# Patient Record
Sex: MALE | Race: WHITE | NOT HISPANIC OR LATINO | Employment: FULL TIME | URBAN - METROPOLITAN AREA
[De-identification: names, ages, dates, MRNs, and addresses within clinical notes are randomized per-mention and may not be internally consistent; named-entity substitution may affect disease eponyms.]

---

## 2020-02-12 ENCOUNTER — DOCUMENTATION ONLY (OUTPATIENT)
Dept: FAMILY MEDICINE | Facility: CLINIC | Age: 25
End: 2020-02-12

## 2020-02-12 NOTE — PROGRESS NOTES
Pre-Visit Chart Review  For Appointment Scheduled on (2/13/20)    There are no preventive care reminders to display for this patient.

## 2020-02-13 ENCOUNTER — LAB VISIT (OUTPATIENT)
Dept: LAB | Facility: HOSPITAL | Age: 25
End: 2020-02-13
Attending: PHYSICIAN ASSISTANT
Payer: COMMERCIAL

## 2020-02-13 ENCOUNTER — OFFICE VISIT (OUTPATIENT)
Dept: FAMILY MEDICINE | Facility: CLINIC | Age: 25
End: 2020-02-13
Payer: COMMERCIAL

## 2020-02-13 VITALS
TEMPERATURE: 99 F | DIASTOLIC BLOOD PRESSURE: 76 MMHG | WEIGHT: 268.5 LBS | BODY MASS INDEX: 35.59 KG/M2 | HEART RATE: 69 BPM | HEIGHT: 73 IN | OXYGEN SATURATION: 97 % | SYSTOLIC BLOOD PRESSURE: 160 MMHG

## 2020-02-13 DIAGNOSIS — Z00.00 ANNUAL PHYSICAL EXAM: ICD-10-CM

## 2020-02-13 DIAGNOSIS — I10 ESSENTIAL HYPERTENSION: ICD-10-CM

## 2020-02-13 DIAGNOSIS — F32.A DEPRESSION, UNSPECIFIED DEPRESSION TYPE: ICD-10-CM

## 2020-02-13 DIAGNOSIS — Z00.00 ANNUAL PHYSICAL EXAM: Primary | ICD-10-CM

## 2020-02-13 DIAGNOSIS — Z23 IMMUNIZATION DUE: ICD-10-CM

## 2020-02-13 DIAGNOSIS — E66.01 CLASS 2 SEVERE OBESITY WITH SERIOUS COMORBIDITY AND BODY MASS INDEX (BMI) OF 35.0 TO 35.9 IN ADULT, UNSPECIFIED OBESITY TYPE: ICD-10-CM

## 2020-02-13 LAB
BASOPHILS # BLD AUTO: 0.04 K/UL (ref 0–0.2)
BASOPHILS NFR BLD: 0.6 % (ref 0–1.9)
DIFFERENTIAL METHOD: ABNORMAL
EOSINOPHIL # BLD AUTO: 0.1 K/UL (ref 0–0.5)
EOSINOPHIL NFR BLD: 1.3 % (ref 0–8)
ERYTHROCYTE [DISTWIDTH] IN BLOOD BY AUTOMATED COUNT: 11.4 % (ref 11.5–14.5)
HCT VFR BLD AUTO: 47.7 % (ref 40–54)
HGB BLD-MCNC: 15.3 G/DL (ref 14–18)
IMM GRANULOCYTES # BLD AUTO: 0.01 K/UL (ref 0–0.04)
IMM GRANULOCYTES NFR BLD AUTO: 0.1 % (ref 0–0.5)
LYMPHOCYTES # BLD AUTO: 2.5 K/UL (ref 1–4.8)
LYMPHOCYTES NFR BLD: 35 % (ref 18–48)
MCH RBC QN AUTO: 29.7 PG (ref 27–31)
MCHC RBC AUTO-ENTMCNC: 32.1 G/DL (ref 32–36)
MCV RBC AUTO: 92 FL (ref 82–98)
MONOCYTES # BLD AUTO: 0.8 K/UL (ref 0.3–1)
MONOCYTES NFR BLD: 10.9 % (ref 4–15)
NEUTROPHILS # BLD AUTO: 3.7 K/UL (ref 1.8–7.7)
NEUTROPHILS NFR BLD: 52.1 % (ref 38–73)
NRBC BLD-RTO: 0 /100 WBC
PLATELET # BLD AUTO: 259 K/UL (ref 150–350)
PMV BLD AUTO: 10.4 FL (ref 9.2–12.9)
RBC # BLD AUTO: 5.16 M/UL (ref 4.6–6.2)
WBC # BLD AUTO: 7 K/UL (ref 3.9–12.7)

## 2020-02-13 PROCEDURE — 90472 IMMUNIZATION ADMIN EACH ADD: CPT | Mod: S$GLB,,, | Performed by: PHYSICIAN ASSISTANT

## 2020-02-13 PROCEDURE — 99385 PR PREVENTIVE VISIT,NEW,18-39: ICD-10-PCS | Mod: 25,S$GLB,, | Performed by: PHYSICIAN ASSISTANT

## 2020-02-13 PROCEDURE — 90471 IMMUNIZATION ADMIN: CPT | Mod: S$GLB,,, | Performed by: PHYSICIAN ASSISTANT

## 2020-02-13 PROCEDURE — 90715 TDAP VACCINE GREATER THAN OR EQUAL TO 7YO IM: ICD-10-PCS | Mod: S$GLB,,, | Performed by: PHYSICIAN ASSISTANT

## 2020-02-13 PROCEDURE — 3008F PR BODY MASS INDEX (BMI) DOCUMENTED: ICD-10-PCS | Mod: CPTII,S$GLB,, | Performed by: PHYSICIAN ASSISTANT

## 2020-02-13 PROCEDURE — 3077F SYST BP >= 140 MM HG: CPT | Mod: CPTII,S$GLB,, | Performed by: PHYSICIAN ASSISTANT

## 2020-02-13 PROCEDURE — 80053 COMPREHEN METABOLIC PANEL: CPT

## 2020-02-13 PROCEDURE — 80061 LIPID PANEL: CPT

## 2020-02-13 PROCEDURE — 90471 FLU VACCINE (QUAD) GREATER THAN OR EQUAL TO 3YO PRESERVATIVE FREE IM: ICD-10-PCS | Mod: S$GLB,,, | Performed by: PHYSICIAN ASSISTANT

## 2020-02-13 PROCEDURE — 84443 ASSAY THYROID STIM HORMONE: CPT

## 2020-02-13 PROCEDURE — 3077F PR MOST RECENT SYSTOLIC BLOOD PRESSURE >= 140 MM HG: ICD-10-PCS | Mod: CPTII,S$GLB,, | Performed by: PHYSICIAN ASSISTANT

## 2020-02-13 PROCEDURE — 3078F DIAST BP <80 MM HG: CPT | Mod: CPTII,S$GLB,, | Performed by: PHYSICIAN ASSISTANT

## 2020-02-13 PROCEDURE — 90686 IIV4 VACC NO PRSV 0.5 ML IM: CPT | Mod: S$GLB,,, | Performed by: PHYSICIAN ASSISTANT

## 2020-02-13 PROCEDURE — 99385 PREV VISIT NEW AGE 18-39: CPT | Mod: 25,S$GLB,, | Performed by: PHYSICIAN ASSISTANT

## 2020-02-13 PROCEDURE — 36415 COLL VENOUS BLD VENIPUNCTURE: CPT | Mod: PO

## 2020-02-13 PROCEDURE — 99999 PR PBB SHADOW E&M-EST. PATIENT-LVL IV: ICD-10-PCS | Mod: PBBFAC,,, | Performed by: PHYSICIAN ASSISTANT

## 2020-02-13 PROCEDURE — 90686 FLU VACCINE (QUAD) GREATER THAN OR EQUAL TO 3YO PRESERVATIVE FREE IM: ICD-10-PCS | Mod: S$GLB,,, | Performed by: PHYSICIAN ASSISTANT

## 2020-02-13 PROCEDURE — 85025 COMPLETE CBC W/AUTO DIFF WBC: CPT

## 2020-02-13 PROCEDURE — 3008F BODY MASS INDEX DOCD: CPT | Mod: CPTII,S$GLB,, | Performed by: PHYSICIAN ASSISTANT

## 2020-02-13 PROCEDURE — 90715 TDAP VACCINE 7 YRS/> IM: CPT | Mod: S$GLB,,, | Performed by: PHYSICIAN ASSISTANT

## 2020-02-13 PROCEDURE — 99999 PR PBB SHADOW E&M-EST. PATIENT-LVL IV: CPT | Mod: PBBFAC,,, | Performed by: PHYSICIAN ASSISTANT

## 2020-02-13 PROCEDURE — 3078F PR MOST RECENT DIASTOLIC BLOOD PRESSURE < 80 MM HG: ICD-10-PCS | Mod: CPTII,S$GLB,, | Performed by: PHYSICIAN ASSISTANT

## 2020-02-13 PROCEDURE — 90472 TDAP VACCINE GREATER THAN OR EQUAL TO 7YO IM: ICD-10-PCS | Mod: S$GLB,,, | Performed by: PHYSICIAN ASSISTANT

## 2020-02-13 PROCEDURE — 83036 HEMOGLOBIN GLYCOSYLATED A1C: CPT

## 2020-02-13 RX ORDER — HYDROCHLOROTHIAZIDE 12.5 MG/1
12.5 TABLET ORAL DAILY
Qty: 30 TABLET | Refills: 11 | Status: SHIPPED | OUTPATIENT
Start: 2020-02-13 | End: 2021-02-08

## 2020-02-13 RX ORDER — ESCITALOPRAM OXALATE 10 MG/1
10 TABLET ORAL DAILY
Qty: 30 TABLET | Refills: 2 | Status: SHIPPED | OUTPATIENT
Start: 2020-02-13 | End: 2021-02-08

## 2020-02-13 NOTE — PROGRESS NOTES
Identified patient's name and . Administered flu vaccine, IM. Patient tolerated well, aseptic technique maintained. Pain scale 0/10 with injection. Instructed patient to wait in the clinic for 15 minutes after the injection was given. Given in left arm      Identified patient's name and . Administered TDAP vaccine, IM. Patient tolerated well, aseptic technique maintained. Pain scale 0/10 with injection. Instructed patient to wait in the clinic for 15 minutes after the injection was given. Givin in right arm

## 2020-02-13 NOTE — PROGRESS NOTES
Subjective:       Patient ID: Tino Fu is a 24 y.o. male.    Chief Complaint: Annual Exam    Patient is new to [facility].  He presents to establish care and follow-up on elevated blood pressure.  Patient states that he has been told several times in the past that his blood pressure was elevated.  His checked his blood pressure at home and has never had readings below 150 systolic.  He is asymptomatic from hypertensive standpoint.  He denies headache, chest pain, visual changes.  Patient states that he has been steadily gaining weight for the past several years.  He does not follow any particular diet or dizzy exercise.  Patient complains of feeling lethargic and having lack of motivation.  Patient's fiancee is registered nurse and is suggested possible treatment for depression.  He states that his lack motivation does not interfere with his activities of daily living.  He remains quite social with friends and family.  He reports increased amount of stress due to his upcoming wedding and purchasing of a new home.  Patients patient medical/surgical, social and family histories have been reviewed       Review of Systems   Constitutional: Positive for activity change. Negative for unexpected weight change.   HENT: Negative for hearing loss, rhinorrhea and trouble swallowing.    Eyes: Negative for discharge and visual disturbance.   Respiratory: Negative for chest tightness and wheezing.    Cardiovascular: Negative for chest pain and palpitations.   Gastrointestinal: Negative for blood in stool, constipation, diarrhea and vomiting.   Endocrine: Negative for polydipsia and polyuria.   Genitourinary: Negative for difficulty urinating, hematuria and urgency.   Musculoskeletal: Negative for arthralgias, joint swelling and neck pain.   Neurological: Negative for weakness and headaches.   Psychiatric/Behavioral: Positive for dysphoric mood. Negative for confusion, self-injury, sleep disturbance and  suicidal ideas. The patient is not nervous/anxious.        Objective:      Physical Exam   Constitutional: He appears well-developed and well-nourished. He is cooperative. No distress.   HENT:   Head: Normocephalic and atraumatic.   Eyes: Conjunctivae and EOM are normal. No scleral icterus.   Neck: Carotid bruit is not present.   Cardiovascular: Normal rate, regular rhythm and normal heart sounds.   Pulmonary/Chest: Effort normal and breath sounds normal.   Abdominal: Soft. Bowel sounds are normal. There is no tenderness.   Musculoskeletal:        Right lower leg: He exhibits no edema.        Left lower leg: He exhibits no edema.   Neurological: He is alert.   Skin: Skin is warm and dry. Capillary refill takes less than 2 seconds.   Psychiatric: He has a normal mood and affect. His speech is normal and behavior is normal. Judgment and thought content normal. Cognition and memory are normal.   PHQ-9 score is 8  Damian 7 score is 5   Vitals reviewed.      Assessment:       1. Annual physical exam    2. Essential hypertension    3. Depression, unspecified depression type    4. Class 2 severe obesity with serious comorbidity and body mass index (BMI) of 35.0 to 35.9 in adult, unspecified obesity type    5. Immunization due        Plan:       Tino was seen today for annual exam.    Diagnoses and all orders for this visit:    Annual physical exam  -     Hemoglobin A1c; Future  -     Lipid panel; Future  -     CBC auto differential; Future  -     Comprehensive metabolic panel; Future  -     Urinalysis; Future  -     TSH; Future  -     Cancel: HIV 1/2 Ag/Ab (4th Gen); Future    Essential hypertension  -     TSH; Future  -     hydroCHLOROthiazide (HYDRODIURIL) 12.5 MG Tab; Take 1 tablet (12.5 mg total) by mouth once daily.    Depression, unspecified depression type  -     escitalopram oxalate (LEXAPRO) 10 MG tablet; Take 1 tablet (10 mg total) by mouth once daily.    Class 2 severe obesity with serious comorbidity and body  mass index (BMI) of 35.0 to 35.9 in adult, unspecified obesity type  -     TSH; Future    Immunization due  -     Influenza - Quadrivalent (6 months+) (PF)    Other orders  -     (In Office Administered) Tdap Vaccine           Follow up for fasting lab today , me in 4 weeks dr ROLLINS in 3 mo .   DISCLAIMER: This note was prepared with [employer] voice recognition transcription software. Garbled syntax, mangled pronouns, and other bizarre constructions may be attributed to that software system

## 2020-02-14 LAB
ALBUMIN SERPL BCP-MCNC: 4.9 G/DL (ref 3.5–5.2)
ALP SERPL-CCNC: 57 U/L (ref 55–135)
ALT SERPL W/O P-5'-P-CCNC: 70 U/L (ref 10–44)
ANION GAP SERPL CALC-SCNC: 11 MMOL/L (ref 8–16)
AST SERPL-CCNC: 39 U/L (ref 10–40)
BILIRUB SERPL-MCNC: 1.4 MG/DL (ref 0.1–1)
BUN SERPL-MCNC: 10 MG/DL (ref 6–20)
CALCIUM SERPL-MCNC: 10.2 MG/DL (ref 8.7–10.5)
CHLORIDE SERPL-SCNC: 101 MMOL/L (ref 95–110)
CHOLEST SERPL-MCNC: 236 MG/DL (ref 120–199)
CHOLEST/HDLC SERPL: 4 {RATIO} (ref 2–5)
CO2 SERPL-SCNC: 26 MMOL/L (ref 23–29)
CREAT SERPL-MCNC: 0.9 MG/DL (ref 0.5–1.4)
EST. GFR  (AFRICAN AMERICAN): >60 ML/MIN/1.73 M^2
EST. GFR  (NON AFRICAN AMERICAN): >60 ML/MIN/1.73 M^2
ESTIMATED AVG GLUCOSE: 105 MG/DL (ref 68–131)
GLUCOSE SERPL-MCNC: 82 MG/DL (ref 70–110)
HBA1C MFR BLD HPLC: 5.3 % (ref 4–5.6)
HDLC SERPL-MCNC: 59 MG/DL (ref 40–75)
HDLC SERPL: 25 % (ref 20–50)
LDLC SERPL CALC-MCNC: 157.8 MG/DL (ref 63–159)
NONHDLC SERPL-MCNC: 177 MG/DL
POTASSIUM SERPL-SCNC: 4.1 MMOL/L (ref 3.5–5.1)
PROT SERPL-MCNC: 8.2 G/DL (ref 6–8.4)
SODIUM SERPL-SCNC: 138 MMOL/L (ref 136–145)
TRIGL SERPL-MCNC: 96 MG/DL (ref 30–150)
TSH SERPL DL<=0.005 MIU/L-ACNC: 0.76 UIU/ML (ref 0.4–4)

## 2020-03-12 ENCOUNTER — DOCUMENTATION ONLY (OUTPATIENT)
Dept: FAMILY MEDICINE | Facility: CLINIC | Age: 25
End: 2020-03-12

## 2020-03-12 NOTE — PROGRESS NOTES
Pre-Visit Chart Review  For Appointment Scheduled on (03/13/20)    There are no preventive care reminders to display for this patient.

## 2020-03-13 ENCOUNTER — OFFICE VISIT (OUTPATIENT)
Dept: FAMILY MEDICINE | Facility: CLINIC | Age: 25
End: 2020-03-13
Payer: COMMERCIAL

## 2020-03-13 VITALS
BODY MASS INDEX: 33.42 KG/M2 | OXYGEN SATURATION: 98 % | HEIGHT: 73 IN | DIASTOLIC BLOOD PRESSURE: 84 MMHG | HEART RATE: 61 BPM | TEMPERATURE: 98 F | WEIGHT: 252.19 LBS | SYSTOLIC BLOOD PRESSURE: 134 MMHG

## 2020-03-13 DIAGNOSIS — I10 ESSENTIAL HYPERTENSION: Primary | ICD-10-CM

## 2020-03-13 DIAGNOSIS — F32.A DEPRESSION, UNSPECIFIED DEPRESSION TYPE: ICD-10-CM

## 2020-03-13 PROCEDURE — 99213 OFFICE O/P EST LOW 20 MIN: CPT | Mod: S$GLB,,, | Performed by: PHYSICIAN ASSISTANT

## 2020-03-13 PROCEDURE — 99999 PR PBB SHADOW E&M-EST. PATIENT-LVL III: CPT | Mod: PBBFAC,,, | Performed by: PHYSICIAN ASSISTANT

## 2020-03-13 PROCEDURE — 99999 PR PBB SHADOW E&M-EST. PATIENT-LVL III: ICD-10-PCS | Mod: PBBFAC,,, | Performed by: PHYSICIAN ASSISTANT

## 2020-03-13 PROCEDURE — 3075F PR MOST RECENT SYSTOLIC BLOOD PRESS GE 130-139MM HG: ICD-10-PCS | Mod: CPTII,S$GLB,, | Performed by: PHYSICIAN ASSISTANT

## 2020-03-13 PROCEDURE — 3079F PR MOST RECENT DIASTOLIC BLOOD PRESSURE 80-89 MM HG: ICD-10-PCS | Mod: CPTII,S$GLB,, | Performed by: PHYSICIAN ASSISTANT

## 2020-03-13 PROCEDURE — 3079F DIAST BP 80-89 MM HG: CPT | Mod: CPTII,S$GLB,, | Performed by: PHYSICIAN ASSISTANT

## 2020-03-13 PROCEDURE — 3008F BODY MASS INDEX DOCD: CPT | Mod: CPTII,S$GLB,, | Performed by: PHYSICIAN ASSISTANT

## 2020-03-13 PROCEDURE — 3075F SYST BP GE 130 - 139MM HG: CPT | Mod: CPTII,S$GLB,, | Performed by: PHYSICIAN ASSISTANT

## 2020-03-13 PROCEDURE — 99213 PR OFFICE/OUTPT VISIT, EST, LEVL III, 20-29 MIN: ICD-10-PCS | Mod: S$GLB,,, | Performed by: PHYSICIAN ASSISTANT

## 2020-03-13 PROCEDURE — 3008F PR BODY MASS INDEX (BMI) DOCUMENTED: ICD-10-PCS | Mod: CPTII,S$GLB,, | Performed by: PHYSICIAN ASSISTANT

## 2020-03-13 NOTE — PROGRESS NOTES
Subjective:       Patient ID: Tino Fu is a 24 y.o. male.    Chief Complaint: Follow-up    Patient presents for one-month follow-up of hypertension and depression.  One month ago he was started on hydrochlorothiazide.  His blood pressure has been running in the 130s over 70s to 80 per his home log.  He is not having any adverse side effects to the medication.  Regarding his depression he is started Lexapro 10 mg daily.  He states that he has noticed a positive difference in his mood.  His labs showed elevated cholesterol and slightly elevated ALT.  Patient has started diet exercise program.  He is doing quite well and has lost 14 lb in the past 1 month.  Overall he is feeling well and has no complaints  Patients patient medical/surgical, social and family histories have been reviewed       Review of Systems   Constitutional: Negative for activity change, appetite change, fatigue and unexpected weight change.   Respiratory: Negative for cough, chest tightness and shortness of breath.    Cardiovascular: Negative for chest pain, palpitations and leg swelling.   Gastrointestinal: Negative for abdominal pain, anal bleeding, blood in stool, constipation, diarrhea and nausea.   Endocrine: Negative for polydipsia and polyuria.   Genitourinary: Negative for difficulty urinating, frequency, hematuria and urgency.   Musculoskeletal: Negative for arthralgias.   Skin: Negative for rash.   Neurological: Negative for dizziness and headaches.   Psychiatric/Behavioral: Negative for dysphoric mood. The patient is not nervous/anxious.        Objective:      Physical Exam   Constitutional: He appears well-developed and well-nourished. He is cooperative. No distress.   HENT:   Head: Normocephalic and atraumatic.   Cardiovascular: Normal rate, regular rhythm and normal heart sounds.   Pulmonary/Chest: Effort normal and breath sounds normal.   Musculoskeletal:        Right lower leg: He exhibits no edema.        Left lower leg:  He exhibits no edema.   Neurological: He is alert.   Skin: Skin is warm and dry. Capillary refill takes less than 2 seconds.       Assessment:       1. Essential hypertension    2. Depression, unspecified depression type        Plan:       Tino was seen today for follow-up.    Diagnoses and all orders for this visit:    Essential hypertension  -     Comprehensive metabolic panel; Future  -     Lipid panel; Future  Continue hydrochlorothiazide  Depression, unspecified depression type  Continue Lexapro         No follow-ups on file.  Follow-up as scheduled and we will repeat his CMP and lipid panel to check for improvement with his very strict lifestyle modifications  DISCLAIMER: This note was prepared with [employer] voice recognition transcription software. Garbled syntax, mangled pronouns, and other bizarre constructions may be attributed to that software system

## 2020-10-09 ENCOUNTER — OCCUPATIONAL HEALTH (OUTPATIENT)
Dept: URGENT CARE | Facility: CLINIC | Age: 25
End: 2020-10-09

## 2020-10-09 PROCEDURE — 80305 DRUG TEST PRSMV DIR OPT OBS: CPT | Mod: S$GLB,,, | Performed by: EMERGENCY MEDICINE

## 2020-10-09 PROCEDURE — 80305 PR DRUG SCREEN - 1: ICD-10-PCS | Mod: S$GLB,,, | Performed by: EMERGENCY MEDICINE

## 2021-02-08 ENCOUNTER — OFFICE VISIT (OUTPATIENT)
Dept: FAMILY MEDICINE | Facility: CLINIC | Age: 26
End: 2021-02-08
Payer: COMMERCIAL

## 2021-02-08 ENCOUNTER — LAB VISIT (OUTPATIENT)
Dept: LAB | Facility: HOSPITAL | Age: 26
End: 2021-02-08
Attending: PHYSICIAN ASSISTANT
Payer: COMMERCIAL

## 2021-02-08 VITALS
BODY MASS INDEX: 34.95 KG/M2 | WEIGHT: 263.69 LBS | SYSTOLIC BLOOD PRESSURE: 138 MMHG | DIASTOLIC BLOOD PRESSURE: 84 MMHG | HEIGHT: 73 IN | HEART RATE: 81 BPM | TEMPERATURE: 98 F | OXYGEN SATURATION: 98 %

## 2021-02-08 DIAGNOSIS — I10 ESSENTIAL HYPERTENSION: ICD-10-CM

## 2021-02-08 DIAGNOSIS — Z00.00 ANNUAL PHYSICAL EXAM: ICD-10-CM

## 2021-02-08 DIAGNOSIS — Z23 IMMUNIZATION DUE: ICD-10-CM

## 2021-02-08 DIAGNOSIS — Z87.438 HISTORY OF PROSTATITIS: ICD-10-CM

## 2021-02-08 DIAGNOSIS — Z00.00 ANNUAL PHYSICAL EXAM: Primary | ICD-10-CM

## 2021-02-08 LAB
BASOPHILS # BLD AUTO: 0.05 K/UL (ref 0–0.2)
BASOPHILS NFR BLD: 0.7 % (ref 0–1.9)
DIFFERENTIAL METHOD: NORMAL
EOSINOPHIL # BLD AUTO: 0.1 K/UL (ref 0–0.5)
EOSINOPHIL NFR BLD: 1.4 % (ref 0–8)
ERYTHROCYTE [DISTWIDTH] IN BLOOD BY AUTOMATED COUNT: 11.5 % (ref 11.5–14.5)
HCT VFR BLD AUTO: 44.2 % (ref 40–54)
HGB BLD-MCNC: 14.7 G/DL (ref 14–18)
IMM GRANULOCYTES # BLD AUTO: 0.01 K/UL (ref 0–0.04)
IMM GRANULOCYTES NFR BLD AUTO: 0.1 % (ref 0–0.5)
LYMPHOCYTES # BLD AUTO: 2.4 K/UL (ref 1–4.8)
LYMPHOCYTES NFR BLD: 34.8 % (ref 18–48)
MCH RBC QN AUTO: 29.5 PG (ref 27–31)
MCHC RBC AUTO-ENTMCNC: 33.3 G/DL (ref 32–36)
MCV RBC AUTO: 89 FL (ref 82–98)
MONOCYTES # BLD AUTO: 0.7 K/UL (ref 0.3–1)
MONOCYTES NFR BLD: 10.7 % (ref 4–15)
NEUTROPHILS # BLD AUTO: 3.6 K/UL (ref 1.8–7.7)
NEUTROPHILS NFR BLD: 52.3 % (ref 38–73)
NRBC BLD-RTO: 0 /100 WBC
PLATELET # BLD AUTO: 210 K/UL (ref 150–350)
PMV BLD AUTO: 11 FL (ref 9.2–12.9)
RBC # BLD AUTO: 4.98 M/UL (ref 4.6–6.2)
WBC # BLD AUTO: 6.93 K/UL (ref 3.9–12.7)

## 2021-02-08 PROCEDURE — 90686 IIV4 VACC NO PRSV 0.5 ML IM: CPT | Mod: S$GLB,,, | Performed by: PHYSICIAN ASSISTANT

## 2021-02-08 PROCEDURE — 80061 LIPID PANEL: CPT

## 2021-02-08 PROCEDURE — 99999 PR PBB SHADOW E&M-EST. PATIENT-LVL III: ICD-10-PCS | Mod: PBBFAC,,, | Performed by: PHYSICIAN ASSISTANT

## 2021-02-08 PROCEDURE — 99999 PR PBB SHADOW E&M-EST. PATIENT-LVL III: CPT | Mod: PBBFAC,,, | Performed by: PHYSICIAN ASSISTANT

## 2021-02-08 PROCEDURE — 3075F PR MOST RECENT SYSTOLIC BLOOD PRESS GE 130-139MM HG: ICD-10-PCS | Mod: CPTII,S$GLB,, | Performed by: PHYSICIAN ASSISTANT

## 2021-02-08 PROCEDURE — 90471 IMMUNIZATION ADMIN: CPT | Mod: S$GLB,,, | Performed by: PHYSICIAN ASSISTANT

## 2021-02-08 PROCEDURE — 1126F PR PAIN SEVERITY QUANTIFIED, NO PAIN PRESENT: ICD-10-PCS | Mod: S$GLB,,, | Performed by: PHYSICIAN ASSISTANT

## 2021-02-08 PROCEDURE — 80053 COMPREHEN METABOLIC PANEL: CPT

## 2021-02-08 PROCEDURE — 85025 COMPLETE CBC W/AUTO DIFF WBC: CPT

## 2021-02-08 PROCEDURE — 3075F SYST BP GE 130 - 139MM HG: CPT | Mod: CPTII,S$GLB,, | Performed by: PHYSICIAN ASSISTANT

## 2021-02-08 PROCEDURE — 99395 PR PREVENTIVE VISIT,EST,18-39: ICD-10-PCS | Mod: 25,S$GLB,, | Performed by: PHYSICIAN ASSISTANT

## 2021-02-08 PROCEDURE — 86703 HIV-1/HIV-2 1 RESULT ANTBDY: CPT

## 2021-02-08 PROCEDURE — 3079F DIAST BP 80-89 MM HG: CPT | Mod: CPTII,S$GLB,, | Performed by: PHYSICIAN ASSISTANT

## 2021-02-08 PROCEDURE — 3079F PR MOST RECENT DIASTOLIC BLOOD PRESSURE 80-89 MM HG: ICD-10-PCS | Mod: CPTII,S$GLB,, | Performed by: PHYSICIAN ASSISTANT

## 2021-02-08 PROCEDURE — 3008F BODY MASS INDEX DOCD: CPT | Mod: CPTII,S$GLB,, | Performed by: PHYSICIAN ASSISTANT

## 2021-02-08 PROCEDURE — 1126F AMNT PAIN NOTED NONE PRSNT: CPT | Mod: S$GLB,,, | Performed by: PHYSICIAN ASSISTANT

## 2021-02-08 PROCEDURE — 86803 HEPATITIS C AB TEST: CPT

## 2021-02-08 PROCEDURE — 36415 COLL VENOUS BLD VENIPUNCTURE: CPT | Mod: PO

## 2021-02-08 PROCEDURE — 3008F PR BODY MASS INDEX (BMI) DOCUMENTED: ICD-10-PCS | Mod: CPTII,S$GLB,, | Performed by: PHYSICIAN ASSISTANT

## 2021-02-08 PROCEDURE — 90471 FLU VACCINE (QUAD) GREATER THAN OR EQUAL TO 3YO PRESERVATIVE FREE IM: ICD-10-PCS | Mod: S$GLB,,, | Performed by: PHYSICIAN ASSISTANT

## 2021-02-08 PROCEDURE — 99395 PREV VISIT EST AGE 18-39: CPT | Mod: 25,S$GLB,, | Performed by: PHYSICIAN ASSISTANT

## 2021-02-08 PROCEDURE — 90686 FLU VACCINE (QUAD) GREATER THAN OR EQUAL TO 3YO PRESERVATIVE FREE IM: ICD-10-PCS | Mod: S$GLB,,, | Performed by: PHYSICIAN ASSISTANT

## 2021-02-08 RX ORDER — HYDROCHLOROTHIAZIDE 25 MG/1
25 TABLET ORAL DAILY
Qty: 30 TABLET | Refills: 11 | Status: SHIPPED | OUTPATIENT
Start: 2021-02-08 | End: 2022-03-15

## 2021-02-09 LAB
ALBUMIN SERPL BCP-MCNC: 4.5 G/DL (ref 3.5–5.2)
ALP SERPL-CCNC: 56 U/L (ref 55–135)
ALT SERPL W/O P-5'-P-CCNC: 28 U/L (ref 10–44)
ANION GAP SERPL CALC-SCNC: 14 MMOL/L (ref 8–16)
AST SERPL-CCNC: 26 U/L (ref 10–40)
BILIRUB SERPL-MCNC: 1.3 MG/DL (ref 0.1–1)
BUN SERPL-MCNC: 8 MG/DL (ref 6–20)
CALCIUM SERPL-MCNC: 9.6 MG/DL (ref 8.7–10.5)
CHLORIDE SERPL-SCNC: 102 MMOL/L (ref 95–110)
CHOLEST SERPL-MCNC: 202 MG/DL (ref 120–199)
CHOLEST/HDLC SERPL: 3.7 {RATIO} (ref 2–5)
CO2 SERPL-SCNC: 23 MMOL/L (ref 23–29)
CREAT SERPL-MCNC: 0.8 MG/DL (ref 0.5–1.4)
EST. GFR  (AFRICAN AMERICAN): >60 ML/MIN/1.73 M^2
EST. GFR  (NON AFRICAN AMERICAN): >60 ML/MIN/1.73 M^2
GLUCOSE SERPL-MCNC: 79 MG/DL (ref 70–110)
HCV AB SERPL QL IA: NEGATIVE
HDLC SERPL-MCNC: 55 MG/DL (ref 40–75)
HDLC SERPL: 27.2 % (ref 20–50)
HIV 1+2 AB+HIV1 P24 AG SERPL QL IA: NEGATIVE
LDLC SERPL CALC-MCNC: 123 MG/DL (ref 63–159)
NONHDLC SERPL-MCNC: 147 MG/DL
POTASSIUM SERPL-SCNC: 3.8 MMOL/L (ref 3.5–5.1)
PROT SERPL-MCNC: 7.4 G/DL (ref 6–8.4)
SODIUM SERPL-SCNC: 139 MMOL/L (ref 136–145)
TRIGL SERPL-MCNC: 120 MG/DL (ref 30–150)

## 2021-02-15 ENCOUNTER — OFFICE VISIT (OUTPATIENT)
Dept: UROLOGY | Facility: CLINIC | Age: 26
End: 2021-02-15
Payer: COMMERCIAL

## 2021-02-15 VITALS
HEART RATE: 74 BPM | WEIGHT: 262.38 LBS | DIASTOLIC BLOOD PRESSURE: 76 MMHG | BODY MASS INDEX: 34.77 KG/M2 | SYSTOLIC BLOOD PRESSURE: 144 MMHG | HEIGHT: 73 IN | TEMPERATURE: 97 F

## 2021-02-15 DIAGNOSIS — N50.811 RIGHT TESTICULAR PAIN: Primary | ICD-10-CM

## 2021-02-15 PROCEDURE — 99204 OFFICE O/P NEW MOD 45 MIN: CPT | Mod: S$GLB,,, | Performed by: UROLOGY

## 2021-02-15 PROCEDURE — 99999 PR PBB SHADOW E&M-EST. PATIENT-LVL III: ICD-10-PCS | Mod: PBBFAC,,, | Performed by: UROLOGY

## 2021-02-15 PROCEDURE — 99204 PR OFFICE/OUTPT VISIT, NEW, LEVL IV, 45-59 MIN: ICD-10-PCS | Mod: S$GLB,,, | Performed by: UROLOGY

## 2021-02-15 PROCEDURE — 3077F SYST BP >= 140 MM HG: CPT | Mod: CPTII,S$GLB,, | Performed by: UROLOGY

## 2021-02-15 PROCEDURE — 3078F PR MOST RECENT DIASTOLIC BLOOD PRESSURE < 80 MM HG: ICD-10-PCS | Mod: CPTII,S$GLB,, | Performed by: UROLOGY

## 2021-02-15 PROCEDURE — 3008F BODY MASS INDEX DOCD: CPT | Mod: CPTII,S$GLB,, | Performed by: UROLOGY

## 2021-02-15 PROCEDURE — 1126F AMNT PAIN NOTED NONE PRSNT: CPT | Mod: S$GLB,,, | Performed by: UROLOGY

## 2021-02-15 PROCEDURE — 1126F PR PAIN SEVERITY QUANTIFIED, NO PAIN PRESENT: ICD-10-PCS | Mod: S$GLB,,, | Performed by: UROLOGY

## 2021-02-15 PROCEDURE — 3077F PR MOST RECENT SYSTOLIC BLOOD PRESSURE >= 140 MM HG: ICD-10-PCS | Mod: CPTII,S$GLB,, | Performed by: UROLOGY

## 2021-02-15 PROCEDURE — 99999 PR PBB SHADOW E&M-EST. PATIENT-LVL III: CPT | Mod: PBBFAC,,, | Performed by: UROLOGY

## 2021-02-15 PROCEDURE — 3008F PR BODY MASS INDEX (BMI) DOCUMENTED: ICD-10-PCS | Mod: CPTII,S$GLB,, | Performed by: UROLOGY

## 2021-02-15 PROCEDURE — 3078F DIAST BP <80 MM HG: CPT | Mod: CPTII,S$GLB,, | Performed by: UROLOGY

## 2021-02-22 ENCOUNTER — HOSPITAL ENCOUNTER (OUTPATIENT)
Dept: RADIOLOGY | Facility: HOSPITAL | Age: 26
Discharge: HOME | End: 2021-02-22
Attending: UROLOGY
Payer: COMMERCIAL

## 2021-02-22 DIAGNOSIS — N50.811 RIGHT TESTICULAR PAIN: ICD-10-CM

## 2021-02-22 PROCEDURE — 76870 US EXAM SCROTUM: CPT | Mod: TC

## 2021-02-22 PROCEDURE — 76870 US SCROTUM AND TESTICLES: ICD-10-PCS | Mod: 26,,, | Performed by: RADIOLOGY

## 2021-02-22 PROCEDURE — 76870 US EXAM SCROTUM: CPT | Mod: 26,,, | Performed by: RADIOLOGY

## 2021-02-23 ENCOUNTER — PATIENT MESSAGE (OUTPATIENT)
Dept: UROLOGY | Facility: CLINIC | Age: 26
End: 2021-02-23

## 2022-01-24 ENCOUNTER — TELEPHONE (OUTPATIENT)
Dept: FAMILY MEDICINE | Facility: CLINIC | Age: 27
End: 2022-01-24
Payer: COMMERCIAL

## 2022-01-31 ENCOUNTER — PATIENT MESSAGE (OUTPATIENT)
Dept: FAMILY MEDICINE | Facility: CLINIC | Age: 27
End: 2022-01-31
Payer: COMMERCIAL

## 2022-02-01 ENCOUNTER — OFFICE VISIT (OUTPATIENT)
Dept: FAMILY MEDICINE | Facility: CLINIC | Age: 27
End: 2022-02-01
Payer: COMMERCIAL

## 2022-02-01 ENCOUNTER — HOSPITAL ENCOUNTER (OUTPATIENT)
Dept: RADIOLOGY | Facility: CLINIC | Age: 27
Discharge: HOME | End: 2022-02-01
Attending: PHYSICIAN ASSISTANT
Payer: COMMERCIAL

## 2022-02-01 VITALS
HEART RATE: 82 BPM | BODY MASS INDEX: 34.09 KG/M2 | HEIGHT: 73 IN | WEIGHT: 257.25 LBS | OXYGEN SATURATION: 97 % | SYSTOLIC BLOOD PRESSURE: 124 MMHG | DIASTOLIC BLOOD PRESSURE: 78 MMHG | TEMPERATURE: 99 F

## 2022-02-01 DIAGNOSIS — M79.672 FOOT PAIN, BILATERAL: ICD-10-CM

## 2022-02-01 DIAGNOSIS — M79.671 FOOT PAIN, BILATERAL: ICD-10-CM

## 2022-02-01 DIAGNOSIS — Z00.00 ANNUAL PHYSICAL EXAM: Primary | ICD-10-CM

## 2022-02-01 DIAGNOSIS — I10 ESSENTIAL HYPERTENSION: ICD-10-CM

## 2022-02-01 DIAGNOSIS — L98.9 SKIN LESIONS, GENERALIZED: ICD-10-CM

## 2022-02-01 DIAGNOSIS — H61.22 CERUMEN DEBRIS ON TYMPANIC MEMBRANE OF LEFT EAR: ICD-10-CM

## 2022-02-01 PROCEDURE — 90471 FLU VACCINE (QUAD) GREATER THAN OR EQUAL TO 3YO PRESERVATIVE FREE IM: ICD-10-PCS | Mod: S$GLB,,, | Performed by: PHYSICIAN ASSISTANT

## 2022-02-01 PROCEDURE — 90686 FLU VACCINE (QUAD) GREATER THAN OR EQUAL TO 3YO PRESERVATIVE FREE IM: ICD-10-PCS | Mod: S$GLB,,, | Performed by: PHYSICIAN ASSISTANT

## 2022-02-01 PROCEDURE — 73630 X-RAY EXAM OF FOOT: CPT | Mod: TC,50,FY,PO

## 2022-02-01 PROCEDURE — 99395 PR PREVENTIVE VISIT,EST,18-39: ICD-10-PCS | Mod: 25,S$GLB,, | Performed by: PHYSICIAN ASSISTANT

## 2022-02-01 PROCEDURE — 3074F PR MOST RECENT SYSTOLIC BLOOD PRESSURE < 130 MM HG: ICD-10-PCS | Mod: CPTII,S$GLB,, | Performed by: PHYSICIAN ASSISTANT

## 2022-02-01 PROCEDURE — 99999 PR PBB SHADOW E&M-EST. PATIENT-LVL V: ICD-10-PCS | Mod: PBBFAC,,, | Performed by: PHYSICIAN ASSISTANT

## 2022-02-01 PROCEDURE — 99213 OFFICE O/P EST LOW 20 MIN: CPT | Mod: 25,S$GLB,, | Performed by: PHYSICIAN ASSISTANT

## 2022-02-01 PROCEDURE — 3078F PR MOST RECENT DIASTOLIC BLOOD PRESSURE < 80 MM HG: ICD-10-PCS | Mod: CPTII,S$GLB,, | Performed by: PHYSICIAN ASSISTANT

## 2022-02-01 PROCEDURE — 90471 IMMUNIZATION ADMIN: CPT | Mod: S$GLB,,, | Performed by: PHYSICIAN ASSISTANT

## 2022-02-01 PROCEDURE — 73630 XR FOOT COMPLETE 3 VIEW BILATERAL: ICD-10-PCS | Mod: 26,S$GLB,, | Performed by: RADIOLOGY

## 2022-02-01 PROCEDURE — 3074F SYST BP LT 130 MM HG: CPT | Mod: CPTII,S$GLB,, | Performed by: PHYSICIAN ASSISTANT

## 2022-02-01 PROCEDURE — 99213 PR OFFICE/OUTPT VISIT, EST, LEVL III, 20-29 MIN: ICD-10-PCS | Mod: 25,S$GLB,, | Performed by: PHYSICIAN ASSISTANT

## 2022-02-01 PROCEDURE — 3008F BODY MASS INDEX DOCD: CPT | Mod: CPTII,S$GLB,, | Performed by: PHYSICIAN ASSISTANT

## 2022-02-01 PROCEDURE — 1159F MED LIST DOCD IN RCRD: CPT | Mod: CPTII,S$GLB,, | Performed by: PHYSICIAN ASSISTANT

## 2022-02-01 PROCEDURE — 99395 PREV VISIT EST AGE 18-39: CPT | Mod: 25,S$GLB,, | Performed by: PHYSICIAN ASSISTANT

## 2022-02-01 PROCEDURE — 3078F DIAST BP <80 MM HG: CPT | Mod: CPTII,S$GLB,, | Performed by: PHYSICIAN ASSISTANT

## 2022-02-01 PROCEDURE — 1159F PR MEDICATION LIST DOCUMENTED IN MEDICAL RECORD: ICD-10-PCS | Mod: CPTII,S$GLB,, | Performed by: PHYSICIAN ASSISTANT

## 2022-02-01 PROCEDURE — 90686 IIV4 VACC NO PRSV 0.5 ML IM: CPT | Mod: S$GLB,,, | Performed by: PHYSICIAN ASSISTANT

## 2022-02-01 PROCEDURE — 3008F PR BODY MASS INDEX (BMI) DOCUMENTED: ICD-10-PCS | Mod: CPTII,S$GLB,, | Performed by: PHYSICIAN ASSISTANT

## 2022-02-01 PROCEDURE — 99999 PR PBB SHADOW E&M-EST. PATIENT-LVL V: CPT | Mod: PBBFAC,,, | Performed by: PHYSICIAN ASSISTANT

## 2022-02-01 PROCEDURE — 73630 X-RAY EXAM OF FOOT: CPT | Mod: 26,S$GLB,, | Performed by: RADIOLOGY

## 2022-02-01 NOTE — PROGRESS NOTES
Subjective:       Patient ID: Tino Fu is a 26 y.o. male.    Chief Complaint: Annual Exam    Patient with controlled hypertension presents for routine annual exam.  Regarding health maintenance he is due for influenza vaccine and COVID booster.  He is due for routine lab as well.  He has several issues that he would like to discuss.  First he complains of removing a large amount of cerumen from the right ear several weeks ago.  He would like for me to examine the left ear.  He denies any pain or decreased hearing.  Next a complains of bilateral foot pain.  This has been occurring for many years.  Pain increases with standing or walking for long periods of time.  He has no injury.  Pain improves with rest.  He occasionally will take acetaminophen for pain.  Patients patient medical/surgical, social and family histories have been reviewed       Review of Systems   Musculoskeletal: Positive for arthralgias.   All other systems reviewed and are negative.      Objective:      Physical Exam  Vitals reviewed.   Constitutional:       General: He is not in acute distress.     Appearance: Normal appearance. He is well-developed and well-nourished. He is not ill-appearing.   HENT:      Head: Normocephalic and atraumatic.      Right Ear: Tympanic membrane, ear canal and external ear normal.      Left Ear: There is impacted cerumen.   Eyes:      General: No scleral icterus.     Extraocular Movements: EOM normal.      Conjunctiva/sclera: Conjunctivae normal.   Neck:      Vascular: No carotid bruit.   Cardiovascular:      Rate and Rhythm: Normal rate and regular rhythm.      Heart sounds: Normal heart sounds.   Pulmonary:      Effort: Pulmonary effort is normal.      Breath sounds: Normal breath sounds.   Abdominal:      General: Bowel sounds are normal.      Palpations: Abdomen is soft.      Tenderness: There is no abdominal tenderness.   Musculoskeletal:      Right lower leg: No edema.      Left lower leg: No edema.  "     Right ankle: Normal.      Right Achilles Tendon: Normal.      Left ankle: Normal.      Left Achilles Tendon: Normal.      Right foot: Normal.      Left foot: Normal.   Neurological:      Mental Status: He is alert.   Psychiatric:         Mood and Affect: Mood normal.         Behavior: Behavior is cooperative.         Assessment:       1. Annual physical exam    2. Essential hypertension    3. Cerumen debris on tympanic membrane of left ear    4. Foot pain, bilateral    5. Skin lesions, generalized        Plan:       Tino was seen today for annual exam.    Diagnoses and all orders for this visit:    Annual physical exam  -     CBC Auto Differential; Future  -     Comprehensive Metabolic Panel; Future  -     Lipid Panel; Future  -     Urinalysis; Future    Essential hypertension  -     CBC Auto Differential; Future  -     Comprehensive Metabolic Panel; Future  -     Lipid Panel; Future  -     Urinalysis; Future    Cerumen debris on tympanic membrane of left ear  Irrigation per MA   Foot pain, bilateral  X-Ray Foot AP Bilateral; Future  -     Ambulatory referral/consult to Podiatry; Future    Skin lesions, generalized  -     Ambulatory referral/consult to Dermatology; Future    Other orders  -     Influenza - Quadrivalent *Preferred* (6 months+) (PF)           Follow up for fasting lab today, .           Documentation entered by me for this encounter may have been done in part using speech-recognition technology. Although I have made an effort to ensure accuracy, "sound like" errors may exist and should be interpreted in context.     "

## 2022-02-04 DIAGNOSIS — R74.8 LIVER ENZYME ELEVATION: Primary | ICD-10-CM

## 2022-02-07 ENCOUNTER — OFFICE VISIT (OUTPATIENT)
Dept: PODIATRY | Facility: CLINIC | Age: 27
End: 2022-02-07
Payer: COMMERCIAL

## 2022-02-07 VITALS — HEIGHT: 73 IN | HEART RATE: 94 BPM | BODY MASS INDEX: 34.06 KG/M2 | WEIGHT: 257 LBS | OXYGEN SATURATION: 100 %

## 2022-02-07 DIAGNOSIS — M20.42 HAMMER TOES OF BOTH FEET: ICD-10-CM

## 2022-02-07 DIAGNOSIS — M79.672 BILATERAL FOOT PAIN: ICD-10-CM

## 2022-02-07 DIAGNOSIS — M77.51 CAPSULITIS OF METATARSOPHALANGEAL (MTP) JOINT OF RIGHT FOOT: ICD-10-CM

## 2022-02-07 DIAGNOSIS — M77.42 METATARSALGIA OF BOTH FEET: Primary | ICD-10-CM

## 2022-02-07 DIAGNOSIS — M79.672 FOOT PAIN, BILATERAL: ICD-10-CM

## 2022-02-07 DIAGNOSIS — B35.3 TINEA PEDIS OF RIGHT FOOT: ICD-10-CM

## 2022-02-07 DIAGNOSIS — M20.41 HAMMER TOES OF BOTH FEET: ICD-10-CM

## 2022-02-07 DIAGNOSIS — M79.671 FOOT PAIN, BILATERAL: ICD-10-CM

## 2022-02-07 DIAGNOSIS — M79.671 BILATERAL FOOT PAIN: ICD-10-CM

## 2022-02-07 DIAGNOSIS — M77.41 METATARSALGIA OF BOTH FEET: Primary | ICD-10-CM

## 2022-02-07 DIAGNOSIS — M77.52 CAPSULITIS OF METATARSOPHALANGEAL (MTP) JOINT OF LEFT FOOT: ICD-10-CM

## 2022-02-07 PROCEDURE — 99204 PR OFFICE/OUTPT VISIT, NEW, LEVL IV, 45-59 MIN: ICD-10-PCS | Mod: S$GLB,,, | Performed by: PODIATRIST

## 2022-02-07 PROCEDURE — 3008F BODY MASS INDEX DOCD: CPT | Mod: CPTII,S$GLB,, | Performed by: PODIATRIST

## 2022-02-07 PROCEDURE — 1160F RVW MEDS BY RX/DR IN RCRD: CPT | Mod: CPTII,S$GLB,, | Performed by: PODIATRIST

## 2022-02-07 PROCEDURE — 1160F PR REVIEW ALL MEDS BY PRESCRIBER/CLIN PHARMACIST DOCUMENTED: ICD-10-PCS | Mod: CPTII,S$GLB,, | Performed by: PODIATRIST

## 2022-02-07 PROCEDURE — 1159F PR MEDICATION LIST DOCUMENTED IN MEDICAL RECORD: ICD-10-PCS | Mod: CPTII,S$GLB,, | Performed by: PODIATRIST

## 2022-02-07 PROCEDURE — 3008F PR BODY MASS INDEX (BMI) DOCUMENTED: ICD-10-PCS | Mod: CPTII,S$GLB,, | Performed by: PODIATRIST

## 2022-02-07 PROCEDURE — 99204 OFFICE O/P NEW MOD 45 MIN: CPT | Mod: S$GLB,,, | Performed by: PODIATRIST

## 2022-02-07 PROCEDURE — 1159F MED LIST DOCD IN RCRD: CPT | Mod: CPTII,S$GLB,, | Performed by: PODIATRIST

## 2022-02-07 NOTE — PROGRESS NOTES
"  [address] [facility] [address]  [place] [address]  Phone: [phone]   [phone]    Patient's PCP:Primary Doctor No  Referring Provider: Roxanna Robles    Subjective:      Chief Complaint:: Foot Pain (Bilateral)    HPI  Tino Fu is a 26 y.o. male who presents with a complaint of bilateral foot pain lasting for 10 years ago. Onset of symptoms when walking and felt pain and reports no trauma.  Current symptoms include pain that comes and goes.  Aggravating factors are non-suppportive shoes. Symptoms have stayed the same. Treatment to date have included none. Went to primary care, she did x-rays and referred here.  Patient states that he has no pain today.        Vitals:    02/07/22 0910   Pulse: 94   SpO2: 100%   Weight: 116.6 kg (257 lb)   Height: 6' 1" (1.854 m)   PainSc: 0-No pain      Shoe Size:13     History reviewed. No pertinent surgical history.  Past Medical History:   Diagnosis Date    Hypertension      Family History   Problem Relation Age of Onset    Hypertension Mother     Other Mother         fetal alcohol syndrome ?    Hypertension Father     Stroke Father     Heart failure Father     Diabetes Paternal Grandfather         Social History:   Marital Status:   Alcohol History:  reports current alcohol use.  Tobacco History:  reports that he has never smoked. He has never used smokeless tobacco.  Drug History:  reports previous drug use.    Review of patient's allergies indicates:  No Known Allergies    Current Outpatient Medications   Medication Sig Dispense Refill    hydroCHLOROthiazide (HYDRODIURIL) 25 MG tablet Take 1 tablet (25 mg total) by mouth once daily. 30 tablet 11     No current facility-administered medications for this visit.       Review of Systems   Constitutional: Negative for chills, fatigue, fever and unexpected weight change.   HENT: Negative for hearing loss and trouble swallowing.    Eyes: Negative for photophobia and visual disturbance. "   Respiratory: Negative for cough, shortness of breath and wheezing.    Cardiovascular: Negative for chest pain, palpitations and leg swelling.   Gastrointestinal: Negative for abdominal pain and nausea.   Genitourinary: Negative for dysuria and frequency.   Musculoskeletal: Positive for arthralgias. Negative for back pain, gait problem, joint swelling and myalgias.   Skin: Negative for rash and wound.   Neurological: Negative for tremors, seizures, weakness, numbness and headaches.   Hematological: Does not bruise/bleed easily.         Objective:        Physical Exam:   Foot Exam    General  General Appearance: appears stated age and healthy   Orientation: alert and oriented to person, place, and time   Affect: appropriate   Gait: unimpaired       Right Foot/Ankle     Inspection and Palpation  Ecchymosis: none  Tenderness: none   Swelling: none   Arch: normal  Hammertoes: second toe, third toe, fourth toe and fifth toe  Hallux valgus: no  Hallux limitus: no  Skin Exam: dry skin and tinea; no drainage, no maceration, no ulcer and no erythema   Neurovascular  Dorsalis pedis: 2+  Posterior tibial: 2+  Capillary Refill: 2+  Varicose veins: not present  Saphenous nerve sensation: normal  Tibial nerve sensation: normal  Superficial peroneal nerve sensation: normal  Deep peroneal nerve sensation: normal  Sural nerve sensation: normal    Muscle Strength  Ankle dorsiflexion: 5  Ankle plantar flexion: 5  Ankle inversion: 5  Ankle eversion: 5  Great toe extension: 5  Great toe flexion: 5    Range of Motion    Normal right ankle ROM    Tests  Anterior drawer: negative   Talar tilt: negative   PT Tinel's sign: negative    Paresthesia: negative  Comments  Reducible hammertoe deformity of digits 2 through 5    Left Foot/Ankle      Inspection and Palpation  Ecchymosis: none  Tenderness: none   Swelling: none   Arch: normal  Hammertoes: second toe, third toe, fourth toe and fifth toe  Hallux valgus: no  Hallux limitus: no  Skin  Exam: skin intact; no drainage, no maceration, no ulcer and no erythema   Neurovascular  Dorsalis pedis: 2+  Posterior tibial: 2+  Capillary refill: 2+  Varicose veins: not present  Saphenous nerve sensation: normal  Tibial nerve sensation: normal  Superficial peroneal nerve sensation: normal  Deep peroneal nerve sensation: normal  Sural nerve sensation: normal    Muscle Strength  Ankle dorsiflexion: 5  Ankle plantar flexion: 5  Ankle inversion: 5  Ankle eversion: 5  Great toe extension: 5  Great toe flexion: 5    Range of Motion    Normal left ankle ROM    Tests  Anterior drawer: negative   Talar tilt: negative   PT Tinel's sign: negative  Paresthesia: negative  Comments  Reducible hammertoe deformity of digits 2 through 5    Physical Exam  Cardiovascular:      Pulses:           Dorsalis pedis pulses are 2+ on the right side and 2+ on the left side.        Posterior tibial pulses are 2+ on the right side and 2+ on the left side.   Musculoskeletal:      Right foot: No bunion.      Left foot: No bunion.   Feet:      Right foot:      Skin integrity: Dry skin present. No ulcer, skin breakdown or erythema.      Left foot:      Skin integrity: No ulcer, skin breakdown or erythema.         Imaging: X-Ray Foot Complete 3 view Bilateral  Narrative: EXAMINATION:  XR FOOT COMPLETE 3 VIEW BILATERAL    CLINICAL HISTORY:  Pain in right foot    TECHNIQUE:  AP, lateral, and oblique views of both feet were performed.    COMPARISON:  None    FINDINGS:  No fracture, subluxation, or other significant abnormality is identified.  Joints and soft tissues are unremarkable.  Impression: No significant radiographic abnormality of either foot.    Electronically signed by: Balwinder Valentin MD  Date:    02/01/2022  Time:    13:28               Assessment:       1. Metatarsalgia of both feet    2. Capsulitis of metatarsophalangeal (MTP) joint of left foot    3. Hammer toes of both feet    4. Capsulitis of metatarsophalangeal (MTP) joint of  right foot    5. Tinea pedis of right foot    6. Bilateral foot pain    7. Foot pain, bilateral      Plan:   Metatarsalgia of both feet    Capsulitis of metatarsophalangeal (MTP) joint of left foot    Hammer toes of both feet    Capsulitis of metatarsophalangeal (MTP) joint of right foot    Tinea pedis of right foot    Bilateral foot pain    Foot pain, bilateral  -     Ambulatory referral/consult to Podiatry      Follow up if symptoms worsen or fail to improve.    Procedures        I discussed with the patient that the pain is he is experiencing is feet are due to his foot structure and hammertoe deformities causing excess pressure on the metatarsophalangeal joints.  I discussed conservative treatments of better running-type shoes with stiffer soles as well as simple over-the-counter arch supports to limit pressure to the joints.  Also recommend patient limit weight-bearing in nonsupportive shoes and barefoot.  I did discuss them that his hammertoe for deformity will likely progress through the years which can cause or recurrent or worsening symptoms.    I also discussed them that the dry skin and peeling on his right foot is athlete's foot.  Recommend utilizing over-the-counter spray antiperspirant to decrease moisture to the feet.  Also discussed topical antifungal creams.    Counseling:     I provided patient education verbally regarding:   Patient diagnosis, treatment options, as well as alternatives, risks, and benefits.     Athlete's foot counseling: Counseled patient that it is important to keep the feet dry. Use absorbent cotton socks and change them if they become sweaty. Or wear an open-toe shoe or sandal. Wash the feet at least once a day with soap and water. Apply the antifungal cream as prescribed. Recommend spray antiperspirant to the feet. Some antifungal creams are available without a prescription. It may take a week before the rash starts to improve. It can take about 3 to 4 weeks to completely  clear. Continue the medicine until the rash is all gone. Use over-the-counter antifungal powders or sprays on your feet after exposure to high-risk environments, such as public showers, gyms, and locker rooms. This can help prevent future infections. Wearing appropriate shoes in these situations can help.    I discussed hammertoe deformity and conservative treatment of deep, wider shoes, padding of the sore areas, OTC NSAID, skin softners, palliative care.  I discussed surgical procedures of fusion of the PIPJ of the toes with wires or implants, the follow up and the possible complications.      This note was created using Dragon voice recognition software that occasionally misinterpreted phrases or words.

## 2022-02-07 NOTE — PATIENT INSTRUCTIONS
What Are Mallet, Hammer, and Claw Toes?  Mallet, hammer, and claw toes are most often caused by wearing shoes that are too short or heels that are too high. This jams the toes against the front of the shoe and causes one or more joints to bend. Rarely, disease can cause the joints in the toes to bend. Mallet, hammer, and claw toes are among the most common toe problems. They occur most often in the longest of the four smaller toes.      Inside your toes  There are 3 bones in each of your 4 smaller toes. Where 2 bones connect is called a joint. Normally the toes lie flat. But pressure on the toes or the front of the foot can cause one or more joints to bend. This curls the toe. Toes that stay curled are called mallet toes, hammer toes, or claw toes, depending on which joints are bent.    Symptoms  You may feel pain in the toe or in the ball of your foot. A corn (a hard growth of skin on the top of the toe) may form where the toe rubs against the top of the shoe. Or a callus (a hard growth of skin on the bottom of the foot) may form under the tip of the toe or on the ball of the foot. Corns and calluses can also be painful.    Date Last Reviewed: 10/18/2015  © 3093-3781 [employer]. [address] [place] [address]. All rights reserved. This information is not intended as a substitute for professional medical care. Always follow your healthcare professional's instructions.          Athletes Foot    Athletes foot (tinea pedis) is caused by a fungal infection in the skin. It affects the skin between the toes, causing cracks in the skin called fissures. It can also affect the bottom of the foot where it causes dry white scales and peeling of the skin. This infection is more likely to occur when the foot is in hot, sweaty socks and shoes for long periods of time. You may feel itching and burning between your toes. This infection is treated with skin creams or medicine taken by mouth.    Home  care  The following are general care guidelines:  · It is important to keep the feet dry. Use absorbent cotton socks and change them if they become sweaty. Or wear an open-toe shoe or sandal. Wash the feet at least once a day with soap and water.  · Apply the antifungal cream as prescribed. Some antifungal creams are available without a prescription.  · It may take a week before the rash starts to improve. It can take about 3 to 4 weeks to completely clear. Continue the medicine until the rash is all gone.  · Use over-the-counter antifungal powders or sprays on your feet after exposure to high-risk environments, such as public showers, gyms, and locker rooms. This can help prevent future infections. Wearing appropriate shoes in these situations can help.    Prevention  The following tips may help prevent athletes foot:  · Don't share shoes or socks with someone who has athlete's foot.  · Don't walk barefoot in places where a fungal infection can spread quickly such as locker rooms, showers, and swimming pools.  · Change socks regularly.  · Alternate shoes to assist in drying.    Follow-up care  Follow up with your healthcare provider as recommended if the rash does not improve after 10 days of treatment, or if the rash continues to spread.    When to seek medical care  Get medical attention right away if any of the following occur:  · Fever of 100.4°F (38°C) or higher, or as directed  · Increasing redness or swelling of the foot  · Infection comes back soon after treatment  · Pus draining from cracks in the skin  ·   Date Last Reviewed: 8/1/2016  © 6141-8294 [employer]. [address] [place] [address], [place] [address]. All rights reserved. This information is not intended as a substitute for professional medical care. Always follow your healthcare professional's instructions.

## 2022-02-08 ENCOUNTER — HOSPITAL ENCOUNTER (OUTPATIENT)
Dept: RADIOLOGY | Facility: HOSPITAL | Age: 27
Discharge: HOME | End: 2022-02-08
Attending: PHYSICIAN ASSISTANT
Payer: COMMERCIAL

## 2022-02-08 DIAGNOSIS — R74.8 LIVER ENZYME ELEVATION: ICD-10-CM

## 2022-02-08 DIAGNOSIS — K76.0 FATTY LIVER: Primary | ICD-10-CM

## 2022-02-08 PROCEDURE — 76705 ECHO EXAM OF ABDOMEN: CPT | Mod: 26,,, | Performed by: RADIOLOGY

## 2022-02-08 PROCEDURE — 76705 ECHO EXAM OF ABDOMEN: CPT | Mod: TC

## 2022-02-08 PROCEDURE — 76705 US ABDOMEN LIMITED_LIVER: ICD-10-PCS | Mod: 26,,, | Performed by: RADIOLOGY

## 2022-02-14 ENCOUNTER — TELEPHONE (OUTPATIENT)
Dept: HEPATOLOGY | Facility: CLINIC | Age: 27
End: 2022-02-14
Payer: COMMERCIAL

## 2022-03-14 ENCOUNTER — OFFICE VISIT (OUTPATIENT)
Dept: DERMATOLOGY | Facility: CLINIC | Age: 27
End: 2022-03-14
Payer: COMMERCIAL

## 2022-03-14 VITALS — WEIGHT: 257 LBS | BODY MASS INDEX: 34.06 KG/M2 | HEIGHT: 73 IN

## 2022-03-14 DIAGNOSIS — L98.9 SKIN LESIONS, GENERALIZED: ICD-10-CM

## 2022-03-14 DIAGNOSIS — L90.6 STRIAE DISTENSAE: ICD-10-CM

## 2022-03-14 DIAGNOSIS — L73.9 FOLLICULITIS: ICD-10-CM

## 2022-03-14 DIAGNOSIS — L81.4 SOLAR LENTIGO: ICD-10-CM

## 2022-03-14 DIAGNOSIS — D22.9 MULTIPLE BENIGN NEVI: Primary | ICD-10-CM

## 2022-03-14 PROCEDURE — 99999 PR PBB SHADOW E&M-EST. PATIENT-LVL III: ICD-10-PCS | Mod: PBBFAC,,, | Performed by: DERMATOLOGY

## 2022-03-14 PROCEDURE — 1159F MED LIST DOCD IN RCRD: CPT | Mod: CPTII,S$GLB,, | Performed by: DERMATOLOGY

## 2022-03-14 PROCEDURE — 99203 OFFICE O/P NEW LOW 30 MIN: CPT | Mod: S$GLB,,, | Performed by: DERMATOLOGY

## 2022-03-14 PROCEDURE — 99203 PR OFFICE/OUTPT VISIT, NEW, LEVL III, 30-44 MIN: ICD-10-PCS | Mod: S$GLB,,, | Performed by: DERMATOLOGY

## 2022-03-14 PROCEDURE — 1159F PR MEDICATION LIST DOCUMENTED IN MEDICAL RECORD: ICD-10-PCS | Mod: CPTII,S$GLB,, | Performed by: DERMATOLOGY

## 2022-03-14 PROCEDURE — 3008F PR BODY MASS INDEX (BMI) DOCUMENTED: ICD-10-PCS | Mod: CPTII,S$GLB,, | Performed by: DERMATOLOGY

## 2022-03-14 PROCEDURE — 1160F PR REVIEW ALL MEDS BY PRESCRIBER/CLIN PHARMACIST DOCUMENTED: ICD-10-PCS | Mod: CPTII,S$GLB,, | Performed by: DERMATOLOGY

## 2022-03-14 PROCEDURE — 1160F RVW MEDS BY RX/DR IN RCRD: CPT | Mod: CPTII,S$GLB,, | Performed by: DERMATOLOGY

## 2022-03-14 PROCEDURE — 99999 PR PBB SHADOW E&M-EST. PATIENT-LVL III: CPT | Mod: PBBFAC,,, | Performed by: DERMATOLOGY

## 2022-03-14 PROCEDURE — 3008F BODY MASS INDEX DOCD: CPT | Mod: CPTII,S$GLB,, | Performed by: DERMATOLOGY

## 2022-03-14 NOTE — PROGRESS NOTES
Subjective:       Patient ID:  Tino Fu is a 26 y.o. male who presents for   Chief Complaint   Patient presents with    Skin Check     TBSE     New Patient    Skin Check - TBSE  General look over    Derm Hx:  Denies phx NMSC/MM  + fhx NMSC  Denies fhx MM    Current Outpatient Medications:     hydroCHLOROthiazide (HYDRODIURIL) 25 MG tablet, Take 1 tablet (25 mg total) by mouth once daily., Disp: 30 tablet, Rfl: 11        Review of Systems   Constitutional: Negative for fever, chills and fatigue.   Respiratory: Negative for cough and shortness of breath.    Skin: Positive for activity-related sunscreen use and wears hat.   Hematologic/Lymphatic: Does not bruise/bleed easily.        Objective:    Physical Exam   Constitutional: He appears well-developed and well-nourished. No distress.   Neurological: He is alert and oriented to person, place, and time. He is not disoriented.   Psychiatric: He has a normal mood and affect.   Skin:   Areas Examined (abnormalities noted in diagram):   Scalp / Hair Palpated and Inspected  Head / Face Inspection Performed  Neck Inspection Performed  Chest / Axilla Inspection Performed  Abdomen Inspection Performed  Genitals / Buttocks / Groin Inspection Performed  Back Inspection Performed  RUE Inspected  LUE Inspection Performed  RLE Inspected  LLE Inspection Performed  Nails and Digits Inspection Performed                   Diagram Legend     Erythematous scaling macule/papule c/w actinic keratosis       Vascular papule c/w angioma      Pigmented verrucoid papule/plaque c/w seborrheic keratosis      Yellow umbilicated papule c/w sebaceous hyperplasia      Irregularly shaped tan macule c/w lentigo     1-2 mm smooth white papules consistent with Milia      Movable subcutaneous cyst with punctum c/w epidermal inclusion cyst      Subcutaneous movable cyst c/w pilar cyst      Firm pink to brown papule c/w dermatofibroma      Pedunculated fleshy papule(s) c/w skin tag(s)       Evenly pigmented macule c/w junctional nevus     Mildly variegated pigmented, slightly irregular-bordered macule c/w mildly atypical nevus      Flesh colored to evenly pigmented papule c/w intradermal nevus       Pink pearly papule/plaque c/w basal cell carcinoma      Erythematous hyperkeratotic cursted plaque c/w SCC      Surgical scar with no sign of skin cancer recurrence      Open and closed comedones      Inflammatory papules and pustules      Verrucoid papule consistent consistent with wart     Erythematous eczematous patches and plaques     Dystrophic onycholytic nail with subungual debris c/w onychomycosis     Umbilicated papule    Erythematous-base heme-crusted tan verrucoid plaque consistent with inflamed seborrheic keratosis     Erythematous Silvery Scaling Plaque c/w Psoriasis     See annotation      Assessment / Plan:        Multiple benign nevi  Careful dermoscopy evaluation of nevi performed with none identified as needing biopsy today  Monitor for new mole or moles that are becoming bigger, darker, irritated, or developing irregular borders.     Skin lesions, generalized  -     Ambulatory referral/consult to Dermatology    Solar lentigo  This is a benign hyperpigmented sun induced lesion. Daily sun protection will reduce the number of new lesions. Treatment of these benign lesions are considered cosmetic.    Folliculitis  Pubis,   Start BP wash or chlorhexidine 4% wash (hibiclens is brand name)  in shower daily to groin area    BENZOYL PEROXIDE WASH OVER THE COUNTER    We prefer:  - NEUTROGENA CLEAR PORE mask/cleanser  or  - CERAVE acne foaming cream cleanser   Use small pea size amount, massage on face, chest, and back, rinse well. This ingredient may bleach dark clothing, avoid direct contact of suds with fabrics (best used in the shower)      Striae distensae  Avoid rapid weight changes  Difficult to treat  Cosmetic dermatologist may treat with laser devices for some improvement    Patient  instructed in importance in daily broad spectrum sun protection of at least spf 30. Mineral sunscreen ingredients preferred (Zinc +/- Titanium) and can be found OTC.   Recommend Elta MD for daily use on face and neck.  Patient encouraged to wear hat for all outdoor exposure.   Also discussed sun avoidance and use of protective clothing.               No follow-ups on file.

## 2022-06-13 RX ORDER — HYDROCHLOROTHIAZIDE 25 MG/1
25 TABLET ORAL DAILY
Qty: 90 TABLET | Refills: 1 | Status: SHIPPED | OUTPATIENT
Start: 2022-06-13 | End: 2022-12-20

## 2022-06-13 NOTE — TELEPHONE ENCOUNTER
Please let patient know that I sent in a 6 month supply of hydrochlorothiazide.  [facility]  He does need to schedule an appointment within the next 6 months to establish care with an MD

## 2022-11-16 ENCOUNTER — HOSPITAL ENCOUNTER (EMERGENCY)
Facility: HOSPITAL | Age: 27
Discharge: HOME | End: 2022-11-16
Attending: STUDENT IN AN ORGANIZED HEALTH CARE EDUCATION/TRAINING PROGRAM
Payer: COMMERCIAL

## 2022-11-16 VITALS
RESPIRATION RATE: 18 BRPM | BODY MASS INDEX: 34.06 KG/M2 | HEIGHT: 73 IN | OXYGEN SATURATION: 96 % | DIASTOLIC BLOOD PRESSURE: 87 MMHG | SYSTOLIC BLOOD PRESSURE: 152 MMHG | TEMPERATURE: 98 F | WEIGHT: 257 LBS | HEART RATE: 98 BPM

## 2022-11-16 DIAGNOSIS — M79.652 LEFT THIGH PAIN: ICD-10-CM

## 2022-11-16 DIAGNOSIS — M25.512 ACUTE PAIN OF LEFT SHOULDER: ICD-10-CM
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
## 2022-11-16 PROCEDURE — 96372 THER/PROPH/DIAG INJ SC/IM: CPT | Performed by: STUDENT IN AN ORGANIZED HEALTH CARE EDUCATION/TRAINING PROGRAM

## 2022-11-16 PROCEDURE — 25000003 PHARM REV CODE 250: Performed by: STUDENT IN AN ORGANIZED HEALTH CARE EDUCATION/TRAINING PROGRAM

## 2022-11-16 PROCEDURE — 63600175 PHARM REV CODE 636 W HCPCS: Performed by: STUDENT IN AN ORGANIZED HEALTH CARE EDUCATION/TRAINING PROGRAM

## 2022-11-16 PROCEDURE — 99284 EMERGENCY DEPT VISIT MOD MDM: CPT

## 2022-11-16 RX ORDER — METHOCARBAMOL 500 MG/1
1000 TABLET, FILM COATED ORAL 3 TIMES DAILY
Qty: 30 TABLET | Refills: 0 | Status: SHIPPED | OUTPATIENT
Start: 2022-11-16 | End: 2022-11-21

## 2022-11-16 RX ORDER — DIAZEPAM 2 MG/1
2 TABLET ORAL
Status: COMPLETED | OUTPATIENT
Start: 2022-11-16 | End: 2022-11-16

## 2022-11-16 RX ORDER — KETOROLAC TROMETHAMINE 30 MG/ML
15 INJECTION, SOLUTION INTRAMUSCULAR; INTRAVENOUS
Status: COMPLETED | OUTPATIENT
Start: 2022-11-16 | End: 2022-11-16

## 2022-11-16 RX ADMIN — KETOROLAC TROMETHAMINE 15 MG: 30 INJECTION, SOLUTION INTRAMUSCULAR; INTRAVENOUS at 06:11

## 2022-11-16 RX ADMIN — DIAZEPAM 2 MG: 2 TABLET ORAL at 07:11
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
## 2022-11-17 NOTE — DISCHARGE INSTRUCTIONS

## 2022-12-20 RX ORDER — HYDROCHLOROTHIAZIDE 25 MG/1
TABLET ORAL
Qty: 90 TABLET | Refills: 0 | Status: SHIPPED | OUTPATIENT
Start: 2022-12-20 | End: 2023-03-20

## 2023-02-10 ENCOUNTER — OFFICE VISIT (OUTPATIENT)
Dept: FAMILY MEDICINE | Facility: CLINIC | Age: 28
End: 2023-02-10
Payer: COMMERCIAL

## 2023-02-10 DIAGNOSIS — H10.9 CONJUNCTIVITIS OF LEFT EYE, UNSPECIFIED CONJUNCTIVITIS TYPE: Primary | ICD-10-CM

## 2023-02-10 PROCEDURE — 99214 OFFICE O/P EST MOD 30 MIN: CPT | Mod: 95,,, | Performed by: FAMILY MEDICINE

## 2023-02-10 PROCEDURE — 99214 PR OFFICE/OUTPT VISIT, EST, LEVL IV, 30-39 MIN: ICD-10-PCS | Mod: 95,,, | Performed by: FAMILY MEDICINE

## 2023-02-10 RX ORDER — NEOMYCIN SULFATE, POLYMYXIN B SULFATE AND DEXAMETHASONE 3.5; 10000; 1 MG/ML; [USP'U]/ML; MG/ML
1 SUSPENSION/ DROPS OPHTHALMIC EVERY 6 HOURS
Qty: 5 ML | Refills: 0 | Status: SHIPPED | OUTPATIENT
Start: 2023-02-10 | End: 2023-06-02

## 2023-02-10 NOTE — PROGRESS NOTES
The patient location is:  [place]  The chief complaint leading to consultation is: Eye problem  Visit type: Virtual visit with synchronous audio and video  Total time spent with patient:  Less than 30 minutes  Each patient to whom he or she provides medical services by telemedicine is:  (1) informed of the relationship between the physician and patient and the respective role of any other health care provider with respect to management of the patient; and (2) notified that he or she may decline to receive medical services by telemedicine and may withdraw from such care at any time. Vital signs recorded were provided by the patient.    Notes:  See below    Subjective:   Patient ID: Tino Fu is a 27 y.o. male     Chief Complaint: Eye problem    Notes eye discomfort and swelling for 1-2 days. Denies vision changes. Conerned he is having pink eye and how that will impact having a infant at home.    Review of Systems   Constitutional:  Negative for activity change, chills, fever and unexpected weight change.   HENT:  Negative for hearing loss, rhinorrhea, sore throat and trouble swallowing.    Eyes:  Negative for discharge and visual disturbance.   Respiratory:  Negative for cough, chest tightness, shortness of breath and wheezing.    Cardiovascular:  Negative for chest pain, palpitations and leg swelling.   Gastrointestinal:  Negative for abdominal distention, abdominal pain, blood in stool, constipation, diarrhea and vomiting.   Endocrine: Negative for polydipsia and polyuria.   Genitourinary:  Negative for difficulty urinating, dysuria, flank pain, hematuria and urgency.   Musculoskeletal:  Negative for arthralgias, back pain, joint swelling and neck pain.   Skin:  Negative for color change and pallor.   Neurological:  Negative for weakness and headaches.   Psychiatric/Behavioral:  Negative for agitation, confusion and dysphoric mood.    Past Medical History:   Diagnosis Date    Hypertension      No past  surgical history on file.  Objective:   There were no vitals filed for this visit.  There is no height or weight on file to calculate BMI.  Physical Exam  Vitals and nursing note reviewed.   Constitutional:       Appearance: He is well-developed.   HENT:      Head: Normocephalic and atraumatic.   Eyes:      General: No scleral icterus.        Left eye: No discharge.      Conjunctiva/sclera:      Left eye: Left conjunctiva is injected.   Pulmonary:      Effort: Pulmonary effort is normal. No respiratory distress.   Musculoskeletal:         General: No deformity. Normal range of motion.      Cervical back: Normal range of motion and neck supple.   Skin:     Coloration: Skin is not pale.      Findings: No rash.   Neurological:      Mental Status: He is alert and oriented to person, place, and time.   Psychiatric:         Behavior: Behavior normal.         Thought Content: Thought content normal.         Judgment: Judgment normal.     Assessment:     1. Conjunctivitis of left eye, unspecified conjunctivitis type      Plan:   Conjunctivitis of left eye, unspecified conjunctivitis type  -     neomycin-polymyxin-dexamethasone (MAXITROL) 3.5mg/mL-10,000 unit/mL-0.1 % DrpS; Place 1 drop into the left eye every 6 (six) hours.  Dispense: 5 mL; Refill: 0    Counseled on routine precautions and adequate hand washing        Total time spent of Less than 30 minutes minutes on the day of the visit.This includes face to face time and preparing to see the patient, obtaining and reviewing separately obtained history, documenting clinical information in the electronic or other health record, independently interpreting results and communicating results to the patient/family/caregiver, or care coordinator.    Established patient with me has been instructed that must see me at least 1 time yearly (every 365 days) for refills of medications. Seeing other providers in this clinic is fine but expectation is to see me yearly.    Gera  MD Saleem  02/10/2023    Portions of this note have been dictated with DELIA Sadler.

## 2023-02-13 ENCOUNTER — LAB VISIT (OUTPATIENT)
Dept: LAB | Facility: HOSPITAL | Age: 28
End: 2023-02-13
Payer: COMMERCIAL

## 2023-02-13 ENCOUNTER — OFFICE VISIT (OUTPATIENT)
Dept: FAMILY MEDICINE | Facility: CLINIC | Age: 28
End: 2023-02-13
Payer: COMMERCIAL

## 2023-02-13 VITALS
WEIGHT: 272.06 LBS | DIASTOLIC BLOOD PRESSURE: 84 MMHG | OXYGEN SATURATION: 97 % | SYSTOLIC BLOOD PRESSURE: 132 MMHG | TEMPERATURE: 98 F | HEIGHT: 73 IN | BODY MASS INDEX: 36.06 KG/M2 | HEART RATE: 81 BPM

## 2023-02-13 DIAGNOSIS — Z82.49 FAMILY HISTORY OF BRAIN ANEURYSM: ICD-10-CM

## 2023-02-13 DIAGNOSIS — Z00.00 ANNUAL PHYSICAL EXAM: Primary | ICD-10-CM

## 2023-02-13 DIAGNOSIS — Z00.00 ANNUAL PHYSICAL EXAM: ICD-10-CM

## 2023-02-13 LAB
ALBUMIN SERPL BCP-MCNC: 4.5 G/DL (ref 3.5–5.2)
ALP SERPL-CCNC: 51 U/L (ref 55–135)
ALT SERPL W/O P-5'-P-CCNC: 53 U/L (ref 10–44)
ANION GAP SERPL CALC-SCNC: 12 MMOL/L (ref 8–16)
AST SERPL-CCNC: 30 U/L (ref 10–40)
BASOPHILS # BLD AUTO: 0.06 K/UL (ref 0–0.2)
BASOPHILS NFR BLD: 0.8 % (ref 0–1.9)
BILIRUB SERPL-MCNC: 0.9 MG/DL (ref 0.1–1)
BUN SERPL-MCNC: 12 MG/DL (ref 6–20)
CALCIUM SERPL-MCNC: 10.2 MG/DL (ref 8.7–10.5)
CHLORIDE SERPL-SCNC: 103 MMOL/L (ref 95–110)
CHOLEST SERPL-MCNC: 229 MG/DL (ref 120–199)
CHOLEST/HDLC SERPL: 4.3 {RATIO} (ref 2–5)
CO2 SERPL-SCNC: 25 MMOL/L (ref 23–29)
CREAT SERPL-MCNC: 0.9 MG/DL (ref 0.5–1.4)
DIFFERENTIAL METHOD: ABNORMAL
EOSINOPHIL # BLD AUTO: 0.1 K/UL (ref 0–0.5)
EOSINOPHIL NFR BLD: 1.5 % (ref 0–8)
ERYTHROCYTE [DISTWIDTH] IN BLOOD BY AUTOMATED COUNT: 11.1 % (ref 11.5–14.5)
EST. GFR  (NO RACE VARIABLE): >60 ML/MIN/1.73 M^2
ESTIMATED AVG GLUCOSE: 108 MG/DL (ref 68–131)
GLUCOSE SERPL-MCNC: 86 MG/DL (ref 70–110)
HBA1C MFR BLD: 5.4 % (ref 4–5.6)
HCT VFR BLD AUTO: 46.3 % (ref 40–54)
HDLC SERPL-MCNC: 53 MG/DL (ref 40–75)
HDLC SERPL: 23.1 % (ref 20–50)
HGB BLD-MCNC: 15.5 G/DL (ref 14–18)
IMM GRANULOCYTES # BLD AUTO: 0.01 K/UL (ref 0–0.04)
IMM GRANULOCYTES NFR BLD AUTO: 0.1 % (ref 0–0.5)
LDLC SERPL CALC-MCNC: 137.8 MG/DL (ref 63–159)
LYMPHOCYTES # BLD AUTO: 2.6 K/UL (ref 1–4.8)
LYMPHOCYTES NFR BLD: 36.6 % (ref 18–48)
MCH RBC QN AUTO: 29.4 PG (ref 27–31)
MCHC RBC AUTO-ENTMCNC: 33.5 G/DL (ref 32–36)
MCV RBC AUTO: 88 FL (ref 82–98)
MONOCYTES # BLD AUTO: 0.8 K/UL (ref 0.3–1)
MONOCYTES NFR BLD: 11.5 % (ref 4–15)
NEUTROPHILS # BLD AUTO: 3.6 K/UL (ref 1.8–7.7)
NEUTROPHILS NFR BLD: 49.5 % (ref 38–73)
NONHDLC SERPL-MCNC: 176 MG/DL
NRBC BLD-RTO: 0 /100 WBC
PLATELET # BLD AUTO: 244 K/UL (ref 150–450)
PMV BLD AUTO: 10.1 FL (ref 9.2–12.9)
POTASSIUM SERPL-SCNC: 4.4 MMOL/L (ref 3.5–5.1)
PROT SERPL-MCNC: 7.8 G/DL (ref 6–8.4)
RBC # BLD AUTO: 5.27 M/UL (ref 4.6–6.2)
SODIUM SERPL-SCNC: 140 MMOL/L (ref 136–145)
T4 FREE SERPL-MCNC: 0.93 NG/DL (ref 0.71–1.51)
TESTOST SERPL-MCNC: 192 NG/DL (ref 304–1227)
TRIGL SERPL-MCNC: 191 MG/DL (ref 30–150)
TSH SERPL DL<=0.005 MIU/L-ACNC: 0.77 UIU/ML (ref 0.4–4)
WBC # BLD AUTO: 7.21 K/UL (ref 3.9–12.7)

## 2023-02-13 PROCEDURE — 1159F PR MEDICATION LIST DOCUMENTED IN MEDICAL RECORD: ICD-10-PCS | Mod: CPTII,S$GLB,, | Performed by: PHYSICIAN ASSISTANT

## 2023-02-13 PROCEDURE — 84443 ASSAY THYROID STIM HORMONE: CPT | Performed by: PHYSICIAN ASSISTANT

## 2023-02-13 PROCEDURE — 3075F PR MOST RECENT SYSTOLIC BLOOD PRESS GE 130-139MM HG: ICD-10-PCS | Mod: CPTII,S$GLB,, | Performed by: PHYSICIAN ASSISTANT

## 2023-02-13 PROCEDURE — 84403 ASSAY OF TOTAL TESTOSTERONE: CPT | Performed by: PHYSICIAN ASSISTANT

## 2023-02-13 PROCEDURE — 36415 COLL VENOUS BLD VENIPUNCTURE: CPT | Mod: PO | Performed by: PHYSICIAN ASSISTANT

## 2023-02-13 PROCEDURE — 99395 PR PREVENTIVE VISIT,EST,18-39: ICD-10-PCS | Mod: S$GLB,,, | Performed by: PHYSICIAN ASSISTANT

## 2023-02-13 PROCEDURE — 83036 HEMOGLOBIN GLYCOSYLATED A1C: CPT | Performed by: PHYSICIAN ASSISTANT

## 2023-02-13 PROCEDURE — 1159F MED LIST DOCD IN RCRD: CPT | Mod: CPTII,S$GLB,, | Performed by: PHYSICIAN ASSISTANT

## 2023-02-13 PROCEDURE — 3075F SYST BP GE 130 - 139MM HG: CPT | Mod: CPTII,S$GLB,, | Performed by: PHYSICIAN ASSISTANT

## 2023-02-13 PROCEDURE — 80061 LIPID PANEL: CPT | Performed by: PHYSICIAN ASSISTANT

## 2023-02-13 PROCEDURE — 80053 COMPREHEN METABOLIC PANEL: CPT | Performed by: PHYSICIAN ASSISTANT

## 2023-02-13 PROCEDURE — 99395 PREV VISIT EST AGE 18-39: CPT | Mod: S$GLB,,, | Performed by: PHYSICIAN ASSISTANT

## 2023-02-13 PROCEDURE — 84439 ASSAY OF FREE THYROXINE: CPT | Performed by: PHYSICIAN ASSISTANT

## 2023-02-13 PROCEDURE — 3008F BODY MASS INDEX DOCD: CPT | Mod: CPTII,S$GLB,, | Performed by: PHYSICIAN ASSISTANT

## 2023-02-13 PROCEDURE — 99999 PR PBB SHADOW E&M-EST. PATIENT-LVL III: ICD-10-PCS | Mod: PBBFAC,,, | Performed by: PHYSICIAN ASSISTANT

## 2023-02-13 PROCEDURE — 85025 COMPLETE CBC W/AUTO DIFF WBC: CPT | Performed by: PHYSICIAN ASSISTANT

## 2023-02-13 PROCEDURE — 3079F DIAST BP 80-89 MM HG: CPT | Mod: CPTII,S$GLB,, | Performed by: PHYSICIAN ASSISTANT

## 2023-02-13 PROCEDURE — 3079F PR MOST RECENT DIASTOLIC BLOOD PRESSURE 80-89 MM HG: ICD-10-PCS | Mod: CPTII,S$GLB,, | Performed by: PHYSICIAN ASSISTANT

## 2023-02-13 PROCEDURE — 3008F PR BODY MASS INDEX (BMI) DOCUMENTED: ICD-10-PCS | Mod: CPTII,S$GLB,, | Performed by: PHYSICIAN ASSISTANT

## 2023-02-13 PROCEDURE — 99999 PR PBB SHADOW E&M-EST. PATIENT-LVL III: CPT | Mod: PBBFAC,,, | Performed by: PHYSICIAN ASSISTANT

## 2023-02-13 NOTE — PROGRESS NOTES
Subjective:       Patient ID: Tino Fu is a 27 y.o. male.    Chief Complaint: Annual Exam    Patient with HTN and obesity presents for annual exam.  His Maternal Grandfather  of brain aneurysm (presumed he was in his 30's).  Patient complained of fatigue.  He is having broken sleep.  He has a  baby at home. He is back at work full time 4 days per week.  His diet has been unhealthy.  Patients patient medical/surgical, social and family histories have been reviewed       Review of Systems   Constitutional:  Positive for fatigue and unexpected weight change.     Objective:      Physical Exam  Vitals reviewed.   Constitutional:       General: He is not in acute distress.     Appearance: He is well-developed.   HENT:      Head: Normocephalic and atraumatic.      Right Ear: Tympanic membrane, ear canal and external ear normal.      Left Ear: Tympanic membrane, ear canal and external ear normal.   Eyes:      General: No scleral icterus.     Conjunctiva/sclera: Conjunctivae normal.   Neck:      Vascular: No carotid bruit.   Cardiovascular:      Rate and Rhythm: Normal rate and regular rhythm.      Heart sounds: Normal heart sounds.   Pulmonary:      Effort: Pulmonary effort is normal.      Breath sounds: Normal breath sounds.   Abdominal:      General: Bowel sounds are normal.      Palpations: Abdomen is soft.      Tenderness: There is no abdominal tenderness.   Musculoskeletal:      Cervical back: Neck supple. No tenderness.      Right lower leg: No edema.      Left lower leg: No edema.   Skin:     General: Skin is warm and dry.      Capillary Refill: Capillary refill takes less than 2 seconds.   Neurological:      General: No focal deficit present.      Mental Status: He is alert.   Psychiatric:         Mood and Affect: Mood normal.         Behavior: Behavior normal. Behavior is cooperative.         Judgment: Judgment normal.       Assessment:       1. Annual physical exam    2. Family history of  "brain aneurysm          Plan:       Tino was seen today for annual exam.    Diagnoses and all orders for this visit:    Annual physical exam  -     TSH; Future  -     CBC Auto Differential; Future  -     Lipid Panel; Future  -     Comprehensive Metabolic Panel; Future  -     Cancel: Urinalysis; Future  -     Hemoglobin A1C; Future  -     T4, FREE; Future  -     TESTOSTERONE; Future    Family history of brain aneurysm  -     MRA Brain; Future           Follow up for fasting lab ok today,follow up to estab pcp in 3 mo and follow up weight/BP .     Further recommendations will be made based on results         Documentation entered by me for this encounter may have been done in part using speech-recognition technology. Although I have made an effort to ensure accuracy, "sound like" errors may exist and should be interpreted in context.    "

## 2023-02-14 DIAGNOSIS — R79.89 LOW TESTOSTERONE IN MALE: Primary | ICD-10-CM

## 2023-02-27 ENCOUNTER — PATIENT MESSAGE (OUTPATIENT)
Dept: FAMILY MEDICINE | Facility: CLINIC | Age: 28
End: 2023-02-27
Payer: COMMERCIAL

## 2023-03-20 RX ORDER — HYDROCHLOROTHIAZIDE 25 MG/1
TABLET ORAL
Qty: 90 TABLET | Refills: 0 | Status: SHIPPED | OUTPATIENT
Start: 2023-03-20 | End: 2023-06-19

## 2023-03-20 NOTE — TELEPHONE ENCOUNTER
Refill Routing Note   Medication(s) are not appropriate for processing by [facility] for the following reason(s):       Non-participating provider    [facility] action(s):  Route         Appointments  past 12m or future 3m with PCP    Date Provider   Last Visit   2/13/2023 MIKAELA Hoffmann   Next Visit   Visit date not found MIKAELA Hoffmann   ED visits in past 90 days: 0        Note composed:10:50 AM 03/20/2023

## 2023-04-03 ENCOUNTER — OFFICE VISIT (OUTPATIENT)
Dept: UROLOGY | Facility: CLINIC | Age: 28
End: 2023-04-03
Payer: COMMERCIAL

## 2023-04-03 VITALS
SYSTOLIC BLOOD PRESSURE: 119 MMHG | DIASTOLIC BLOOD PRESSURE: 79 MMHG | WEIGHT: 265 LBS | BODY MASS INDEX: 35.12 KG/M2 | HEIGHT: 73 IN | HEART RATE: 83 BPM

## 2023-04-03 DIAGNOSIS — R79.89 LOW TESTOSTERONE IN MALE: Primary | ICD-10-CM

## 2023-04-03 PROCEDURE — 99214 PR OFFICE/OUTPT VISIT, EST, LEVL IV, 30-39 MIN: ICD-10-PCS | Mod: S$GLB,,, | Performed by: UROLOGY

## 2023-04-03 PROCEDURE — 3008F BODY MASS INDEX DOCD: CPT | Mod: CPTII,S$GLB,, | Performed by: UROLOGY

## 2023-04-03 PROCEDURE — 3074F SYST BP LT 130 MM HG: CPT | Mod: CPTII,S$GLB,, | Performed by: UROLOGY

## 2023-04-03 PROCEDURE — 1160F PR REVIEW ALL MEDS BY PRESCRIBER/CLIN PHARMACIST DOCUMENTED: ICD-10-PCS | Mod: CPTII,S$GLB,, | Performed by: UROLOGY

## 2023-04-03 PROCEDURE — 99999 PR PBB SHADOW E&M-EST. PATIENT-LVL III: CPT | Mod: PBBFAC,,, | Performed by: UROLOGY

## 2023-04-03 PROCEDURE — 3078F PR MOST RECENT DIASTOLIC BLOOD PRESSURE < 80 MM HG: ICD-10-PCS | Mod: CPTII,S$GLB,, | Performed by: UROLOGY

## 2023-04-03 PROCEDURE — 3044F HG A1C LEVEL LT 7.0%: CPT | Mod: CPTII,S$GLB,, | Performed by: UROLOGY

## 2023-04-03 PROCEDURE — 1159F PR MEDICATION LIST DOCUMENTED IN MEDICAL RECORD: ICD-10-PCS | Mod: CPTII,S$GLB,, | Performed by: UROLOGY

## 2023-04-03 PROCEDURE — 99214 OFFICE O/P EST MOD 30 MIN: CPT | Mod: S$GLB,,, | Performed by: UROLOGY

## 2023-04-03 PROCEDURE — 3074F PR MOST RECENT SYSTOLIC BLOOD PRESSURE < 130 MM HG: ICD-10-PCS | Mod: CPTII,S$GLB,, | Performed by: UROLOGY

## 2023-04-03 PROCEDURE — 1159F MED LIST DOCD IN RCRD: CPT | Mod: CPTII,S$GLB,, | Performed by: UROLOGY

## 2023-04-03 PROCEDURE — 1160F RVW MEDS BY RX/DR IN RCRD: CPT | Mod: CPTII,S$GLB,, | Performed by: UROLOGY

## 2023-04-03 PROCEDURE — 3078F DIAST BP <80 MM HG: CPT | Mod: CPTII,S$GLB,, | Performed by: UROLOGY

## 2023-04-03 PROCEDURE — 3008F PR BODY MASS INDEX (BMI) DOCUMENTED: ICD-10-PCS | Mod: CPTII,S$GLB,, | Performed by: UROLOGY

## 2023-04-03 PROCEDURE — 99999 PR PBB SHADOW E&M-EST. PATIENT-LVL III: ICD-10-PCS | Mod: PBBFAC,,, | Performed by: UROLOGY

## 2023-04-03 PROCEDURE — 3044F PR MOST RECENT HEMOGLOBIN A1C LEVEL <7.0%: ICD-10-PCS | Mod: CPTII,S$GLB,, | Performed by: UROLOGY

## 2023-04-03 NOTE — PROGRESS NOTES
"[facility] Established Patient/H&P:    Tino Fu is a 27 y.o. male who presents for low testosterone.    Patient with a history of right testicular pain and prostatitis diagnosed in 2016 while in college. He states at the time he was diagnosed with Ureaplasma and treated with one month of antibiotics. He states that the symptoms resolved after Abx therapy.      He now presents with intermittent right testicular discomfort over the past month that is bothersome. UA and urine culture negative with his PCP on 2/8/21 - records reviewed. Has not taken any medications for his discomfort.       Interval History    4/3/23: Here today for follow up. Scrotal ultrasound with small scrotal pearls and minimal bilateral hydroceles. States his pain has improved. He underwent lab testing with his PCP and was found have low testosterone of 192. He reports weight gain and lethargy for the past several weeks. Although he and his wife have a new baby at home.     No history of STDS, no gross hematuria, fever, chills, nephrolithiasis,  trauma or history  malignancy.         Testosterone  192  4/3/23    Urine culture  No growth        2/8/21       History reviewed. No pertinent past medical history.     History reviewed. No pertinent surgical history.       Past Medical History:   Diagnosis Date    Hypertension        History reviewed. No pertinent surgical history.    Review of patient's allergies indicates:  No Known Allergies    Medications Reviewed: see MAR    FOCUSED PHYSICAL EXAM:    Vitals:    04/03/23 0908   BP: 119/79   Pulse: 83     Body mass index is 34.96 kg/m². Weight: 120.2 kg (265 lb) Height: 6' 1" (185.4 cm)       General: Alert, cooperative, no distress, appears stated age  Abdomen: Soft, non-tender, no CVA tenderness, non-distended      LABS:    No results found for this or any previous visit (from the past 336 hour(s)).          Assessment/Diagnosis:    1. Low testosterone in male  " Ambulatory referral/consult to Urology    Testosterone Panel          Plans:    - I spent 30 minutes of the day of this encounter preparing for, treating and managing this patient. Discussed the risks and benefits of testosterone replacement today.  We went over different testosterone treatments. This included possible cardiac risks.  Will recheck testosterone in 6 weeks. Wishes to make lifestyle changes first. If it remains low, will consider starting Clomid.

## 2023-05-18 ENCOUNTER — LAB VISIT (OUTPATIENT)
Dept: LAB | Facility: HOSPITAL | Age: 28
End: 2023-05-18
Attending: UROLOGY
Payer: COMMERCIAL

## 2023-05-18 DIAGNOSIS — R79.89 LOW TESTOSTERONE IN MALE: ICD-10-CM

## 2023-05-18 PROCEDURE — 84403 ASSAY OF TOTAL TESTOSTERONE: CPT | Performed by: UROLOGY

## 2023-05-18 PROCEDURE — 36415 COLL VENOUS BLD VENIPUNCTURE: CPT | Mod: PO | Performed by: UROLOGY

## 2023-05-18 PROCEDURE — 84270 ASSAY OF SEX HORMONE GLOBUL: CPT | Performed by: UROLOGY

## 2023-05-25 LAB
ALBUMIN SERPL-MCNC: 4.6 G/DL (ref 3.6–5.1)
SHBG SERPL-SCNC: 8 NMOL/L (ref 10–50)
TESTOST FREE SERPL-MCNC: 88.8 PG/ML (ref 46–224)
TESTOST SERPL-MCNC: 295 NG/DL (ref 250–1100)
TESTOSTERONE.FREE+WB SERPL-MCNC: 186.4 NG/DL (ref 110–575)

## 2023-06-02 ENCOUNTER — OFFICE VISIT (OUTPATIENT)
Dept: FAMILY MEDICINE | Facility: CLINIC | Age: 28
End: 2023-06-02
Payer: COMMERCIAL

## 2023-06-02 VITALS
WEIGHT: 268.06 LBS | OXYGEN SATURATION: 97 % | RESPIRATION RATE: 18 BRPM | HEIGHT: 73 IN | TEMPERATURE: 98 F | DIASTOLIC BLOOD PRESSURE: 72 MMHG | HEART RATE: 96 BPM | SYSTOLIC BLOOD PRESSURE: 126 MMHG | BODY MASS INDEX: 35.53 KG/M2

## 2023-06-02 DIAGNOSIS — R51.9 NONINTRACTABLE HEADACHE, UNSPECIFIED CHRONICITY PATTERN, UNSPECIFIED HEADACHE TYPE: ICD-10-CM

## 2023-06-02 DIAGNOSIS — Z00.00 HEALTHCARE MAINTENANCE: Primary | ICD-10-CM

## 2023-06-02 DIAGNOSIS — R07.89 CHEST DISCOMFORT: ICD-10-CM

## 2023-06-02 DIAGNOSIS — Z91.018 MULTIPLE FOOD ALLERGIES: ICD-10-CM

## 2023-06-02 DIAGNOSIS — R19.5 LOOSE STOOLS: ICD-10-CM

## 2023-06-02 PROCEDURE — 3074F SYST BP LT 130 MM HG: CPT | Mod: CPTII,S$GLB,, | Performed by: FAMILY MEDICINE

## 2023-06-02 PROCEDURE — 3078F PR MOST RECENT DIASTOLIC BLOOD PRESSURE < 80 MM HG: ICD-10-PCS | Mod: CPTII,S$GLB,, | Performed by: FAMILY MEDICINE

## 2023-06-02 PROCEDURE — 3078F DIAST BP <80 MM HG: CPT | Mod: CPTII,S$GLB,, | Performed by: FAMILY MEDICINE

## 2023-06-02 PROCEDURE — 3008F PR BODY MASS INDEX (BMI) DOCUMENTED: ICD-10-PCS | Mod: CPTII,S$GLB,, | Performed by: FAMILY MEDICINE

## 2023-06-02 PROCEDURE — 93010 ELECTROCARDIOGRAM REPORT: CPT | Mod: S$GLB,,, | Performed by: INTERNAL MEDICINE

## 2023-06-02 PROCEDURE — 93010 EKG 12-LEAD: ICD-10-PCS | Mod: S$GLB,,, | Performed by: INTERNAL MEDICINE

## 2023-06-02 PROCEDURE — 93005 ELECTROCARDIOGRAM TRACING: CPT | Mod: S$GLB,,, | Performed by: FAMILY MEDICINE

## 2023-06-02 PROCEDURE — 3008F BODY MASS INDEX DOCD: CPT | Mod: CPTII,S$GLB,, | Performed by: FAMILY MEDICINE

## 2023-06-02 PROCEDURE — 99999 PR PBB SHADOW E&M-EST. PATIENT-LVL V: ICD-10-PCS | Mod: PBBFAC,,, | Performed by: FAMILY MEDICINE

## 2023-06-02 PROCEDURE — 99215 OFFICE O/P EST HI 40 MIN: CPT | Mod: S$GLB,,, | Performed by: FAMILY MEDICINE

## 2023-06-02 PROCEDURE — 3074F PR MOST RECENT SYSTOLIC BLOOD PRESSURE < 130 MM HG: ICD-10-PCS | Mod: CPTII,S$GLB,, | Performed by: FAMILY MEDICINE

## 2023-06-02 PROCEDURE — 99215 PR OFFICE/OUTPT VISIT, EST, LEVL V, 40-54 MIN: ICD-10-PCS | Mod: S$GLB,,, | Performed by: FAMILY MEDICINE

## 2023-06-02 PROCEDURE — 3044F PR MOST RECENT HEMOGLOBIN A1C LEVEL <7.0%: ICD-10-PCS | Mod: CPTII,S$GLB,, | Performed by: FAMILY MEDICINE

## 2023-06-02 PROCEDURE — 99999 PR PBB SHADOW E&M-EST. PATIENT-LVL V: CPT | Mod: PBBFAC,,, | Performed by: FAMILY MEDICINE

## 2023-06-02 PROCEDURE — 93005 EKG 12-LEAD: ICD-10-PCS | Mod: S$GLB,,, | Performed by: FAMILY MEDICINE

## 2023-06-02 PROCEDURE — 3044F HG A1C LEVEL LT 7.0%: CPT | Mod: CPTII,S$GLB,, | Performed by: FAMILY MEDICINE

## 2023-06-02 NOTE — PATIENT INSTRUCTIONS
Jacinto Jiménez,     If you are due for any health screening(s) below please notify me so we can arrange them to be ordered and scheduled to maintain your health. Most healthy patients complete it. Don't lose out on improving your health.     All of your core healthy metrics are met.

## 2023-06-02 NOTE — PROGRESS NOTES
Subjective:   Patient ID: Tino Fu is a 28 y.o. male     Chief Complaint:Establish Care      Here for establish care and routine checkup but multiple compalints. Notes headache going on for extended period time. Intermittent. Also notes loose stool after certain foods. Also notes chest discomfort after certain foods. Going on for extended period of time but patient concerned it is heart related.    Review of Systems   Constitutional:  Negative for chills and fever.   HENT:  Negative for sore throat and trouble swallowing.    Respiratory:  Negative for cough and shortness of breath.    Cardiovascular:  Negative for chest pain and leg swelling.   Gastrointestinal:  Negative for abdominal distention and abdominal pain.   Genitourinary:  Negative for dysuria and flank pain.   Musculoskeletal:  Negative for arthralgias and back pain.   Skin:  Negative for color change and pallor.   Neurological:  Negative for weakness and headaches.   Psychiatric/Behavioral:  Negative for agitation and confusion.    Past Medical History:   Diagnosis Date    Hypertension      History reviewed. No pertinent surgical history.  Objective:     Vitals:    06/02/23 1301   BP: 126/72   Pulse: 96   Resp: 18   Temp: 98.2 °F (36.8 °C)     Body mass index is 35.37 kg/m².  Physical Exam  Vitals and nursing note reviewed.   Constitutional:       Appearance: He is well-developed.   HENT:      Head: Normocephalic and atraumatic.   Eyes:      General: No scleral icterus.     Conjunctiva/sclera: Conjunctivae normal.   Cardiovascular:      Heart sounds: No murmur heard.  Pulmonary:      Effort: Pulmonary effort is normal. No respiratory distress.   Musculoskeletal:         General: No deformity. Normal range of motion.      Cervical back: Normal range of motion and neck supple.   Skin:     Coloration: Skin is not pale.      Findings: No rash.   Neurological:      Mental Status: He is alert and oriented to person, place, and time.    Psychiatric:         Behavior: Behavior normal.         Thought Content: Thought content normal.         Judgment: Judgment normal.     Assessment:     1. Healthcare maintenance    2. BMI 35.0-35.9,adult    3. Loose stools    4. Multiple food allergies    5. Nonintractable headache, unspecified chronicity pattern, unspecified headache type    6. Chest discomfort      Plan:   Healthcare maintenance    BMI 35.0-35.9,adult  -     Ambulatory referral/consult to Nutrition Services; Future; Expected date: 06/09/2023    Loose stools  -     Ambulatory referral/consult to Gastroenterology; Future; Expected date: 06/09/2023    Multiple food allergies  -     Ambulatory referral/consult to Allergy; Future; Expected date: 06/09/2023    Nonintractable headache, unspecified chronicity pattern, unspecified headache type  -     Ambulatory referral/consult to Neurology; Future; Expected date: 06/09/2023    Chest discomfort  -     IN OFFICE EKG 12-LEAD (to [facility])      Pt with multiple complaints today. Discussed at length food journal to determine potential cause for loose stools. Wanted see specialist. Discussed at length potential causes for headache. Elected specialist evaluation. Reviewed ekg and discussed findings at length.       Total time spent of Greater than 30 minutes minutes on the day of the visit.This includes face to face time and preparing to see the patient, obtaining and reviewing separately obtained history, documenting clinical information in the electronic or other health record, independently interpreting results and communicating results to the patient/family/caregiver, or care coordinator.    Established patient with me has been instructed that must see me at least 1 time yearly (every 365 days) for refills of medications. Seeing other providers in this clinic is fine but expectation is to see me yearly.    Gera Monge MD  06/02/2023    Portions of this note have been dictated with DELIA Lee

## 2023-06-05 ENCOUNTER — OFFICE VISIT (OUTPATIENT)
Dept: GASTROENTEROLOGY | Facility: CLINIC | Age: 28
End: 2023-06-05
Payer: COMMERCIAL

## 2023-06-05 VITALS
HEIGHT: 73 IN | SYSTOLIC BLOOD PRESSURE: 123 MMHG | WEIGHT: 263.44 LBS | DIASTOLIC BLOOD PRESSURE: 78 MMHG | HEART RATE: 83 BPM | BODY MASS INDEX: 34.91 KG/M2

## 2023-06-05 DIAGNOSIS — R53.83 FATIGUE, UNSPECIFIED TYPE: ICD-10-CM

## 2023-06-05 DIAGNOSIS — R19.8 ALTERNATING CONSTIPATION AND DIARRHEA: Primary | ICD-10-CM

## 2023-06-05 DIAGNOSIS — R16.0 HEPATOMEGALY: ICD-10-CM

## 2023-06-05 DIAGNOSIS — R14.0 ABDOMINAL BLOATING: ICD-10-CM

## 2023-06-05 DIAGNOSIS — K62.5 RECTAL BLEEDING: ICD-10-CM

## 2023-06-05 DIAGNOSIS — R10.2 SUPRAPUBIC PAIN: ICD-10-CM

## 2023-06-05 DIAGNOSIS — I44.0 FIRST DEGREE AV BLOCK: Primary | ICD-10-CM

## 2023-06-05 DIAGNOSIS — K76.0 FATTY LIVER: ICD-10-CM

## 2023-06-05 DIAGNOSIS — R19.8 IRREGULAR BOWEL HABITS: ICD-10-CM

## 2023-06-05 PROCEDURE — 1160F RVW MEDS BY RX/DR IN RCRD: CPT | Mod: CPTII,S$GLB,,

## 2023-06-05 PROCEDURE — 99204 PR OFFICE/OUTPT VISIT, NEW, LEVL IV, 45-59 MIN: ICD-10-PCS | Mod: S$GLB,,,

## 2023-06-05 PROCEDURE — 3078F DIAST BP <80 MM HG: CPT | Mod: CPTII,S$GLB,,

## 2023-06-05 PROCEDURE — 1160F PR REVIEW ALL MEDS BY PRESCRIBER/CLIN PHARMACIST DOCUMENTED: ICD-10-PCS | Mod: CPTII,S$GLB,,

## 2023-06-05 PROCEDURE — 3074F SYST BP LT 130 MM HG: CPT | Mod: CPTII,S$GLB,,

## 2023-06-05 PROCEDURE — 99999 PR PBB SHADOW E&M-EST. PATIENT-LVL IV: CPT | Mod: PBBFAC,,,

## 2023-06-05 PROCEDURE — 3074F PR MOST RECENT SYSTOLIC BLOOD PRESSURE < 130 MM HG: ICD-10-PCS | Mod: CPTII,S$GLB,,

## 2023-06-05 PROCEDURE — 99999 PR PBB SHADOW E&M-EST. PATIENT-LVL IV: ICD-10-PCS | Mod: PBBFAC,,,

## 2023-06-05 PROCEDURE — 3078F PR MOST RECENT DIASTOLIC BLOOD PRESSURE < 80 MM HG: ICD-10-PCS | Mod: CPTII,S$GLB,,

## 2023-06-05 PROCEDURE — 1159F PR MEDICATION LIST DOCUMENTED IN MEDICAL RECORD: ICD-10-PCS | Mod: CPTII,S$GLB,,

## 2023-06-05 PROCEDURE — 3008F PR BODY MASS INDEX (BMI) DOCUMENTED: ICD-10-PCS | Mod: CPTII,S$GLB,,

## 2023-06-05 PROCEDURE — 1159F MED LIST DOCD IN RCRD: CPT | Mod: CPTII,S$GLB,,

## 2023-06-05 PROCEDURE — 3044F PR MOST RECENT HEMOGLOBIN A1C LEVEL <7.0%: ICD-10-PCS | Mod: CPTII,S$GLB,,

## 2023-06-05 PROCEDURE — 3008F BODY MASS INDEX DOCD: CPT | Mod: CPTII,S$GLB,,

## 2023-06-05 PROCEDURE — 3044F HG A1C LEVEL LT 7.0%: CPT | Mod: CPTII,S$GLB,,

## 2023-06-05 PROCEDURE — 99204 OFFICE O/P NEW MOD 45 MIN: CPT | Mod: S$GLB,,,

## 2023-06-05 NOTE — PROGRESS NOTES
Subjective:       Patient ID: Tino Fu is a 28 y.o. male Body mass index is 34.76 kg/m².    Chief Complaint: Bloated (Alternating between constipation and diarrhea)    This patient is new to me.  Referring Provider: Dr. Gera Monge for loose stools.     GI Problem  The primary symptoms include weight loss (Intentional; dieting and reducing carbohydrates; reports he is scheduled to see a nutritionist soon), fatigue, abdominal pain (denies pain currently; occurs during BMs when more constipated once montly; suprapubic; described as a pressure or throb; improves after BM), diarrhea (Intermittent diarrhea; triggers:  Creamy pastas or certain ice creams, which he avoids; patient reports bowel flutuations are a chronic issue) and hematochezia. Primary symptoms do not include fever, nausea, vomiting, melena, hematemesis, jaundice or dysuria.   The hematochezia began more than 1 week ago (chronic). Frequency: 6 months ago noticed small amount of possible red blood in toilet bowl with BMs & intermittently notices small amounts of BRBPR on tissue paper after episodes wiping multiple times to get clean. The hematochezia is a chronic problem.   The illness is also significant for bloating (Occurs when overeating, which he has been avoiding; also reports flatus that is loud and foul-smelling - amount varies depending on BM frequency) and constipation (Stool frequency varies; reports having either 2 BMs daily or skipping up to 3 days between BMs; stool can vary between 1 and 7 on [place] scale, but most commonly 4; straining at times (straining causes throbbing headaches - scheduled to see neuro)). The illness does not include chills, anorexia, dysphagia or odynophagia. Associated medical issues do not include inflammatory bowel disease, GERD, gallstones, liver disease, alcohol abuse, PUD, gastric bypass, bowel resection, irritable bowel syndrome, hemorrhoids or diverticulitis. Associated medical issues  comments: Denies past history of colonoscopy.     Review of Systems   Constitutional:  Positive for fatigue and weight loss (Intentional; dieting and reducing carbohydrates; reports he is scheduled to see a nutritionist soon). Negative for activity change, appetite change, chills, diaphoresis, fever and unexpected weight change.   HENT:  Negative for sore throat and trouble swallowing.    Respiratory:  Negative for cough, choking and shortness of breath.    Cardiovascular:  Negative for chest pain (denies currently).   Gastrointestinal:  Positive for abdominal pain (denies pain currently; occurs during BMs when more constipated once montly; suprapubic; described as a pressure or throb; improves after BM), anal bleeding, bloating (Occurs when overeating, which he has been avoiding; also reports flatus that is loud and foul-smelling - amount varies depending on BM frequency), constipation (Stool frequency varies; reports having either 2 BMs daily or skipping up to 3 days between BMs; stool can vary between 1 and 7 on [place] scale, but most commonly 4; straining at times (straining causes throbbing headaches - scheduled to see neuro)), diarrhea (Intermittent diarrhea; triggers:  Creamy pastas or certain ice creams, which he avoids; patient reports bowel flutuations are a chronic issue) and hematochezia. Negative for abdominal distention, anorexia, blood in stool, dysphagia, hematemesis, jaundice, melena, nausea, rectal pain and vomiting.   Genitourinary:  Negative for dysuria.       No LMP for male patient.  Past Medical History:   Diagnosis Date    Hypertension      History reviewed. No pertinent surgical history.  Family History   Problem Relation Age of Onset    Hypertension Mother     Other Mother         fetal alcohol syndrome ?    Hypertension Father     Stroke Father     Heart failure Father     Diabetes Paternal Grandfather     Colon cancer Neg Hx     Colon polyps Neg Hx     Crohn's disease Neg Hx     Stomach  cancer Neg Hx     Ulcerative colitis Neg Hx     Esophageal cancer Neg Hx      Social History     Tobacco Use    Smoking status: Never    Smokeless tobacco: Never   Substance Use Topics    Alcohol use: Yes     Comment: 4 times a year    Drug use: Not Currently     Wt Readings from Last 10 Encounters:   06/05/23 119.5 kg (263 lb 7.2 oz)   06/02/23 121.6 kg (268 lb 1.3 oz)   04/03/23 120.2 kg (265 lb)   02/13/23 123.4 kg (272 lb 0.8 oz)   11/16/22 116.6 kg (257 lb)   03/14/22 116.6 kg (257 lb)   02/07/22 116.6 kg (257 lb)   02/01/22 116.7 kg (257 lb 4.4 oz)   02/15/21 119 kg (262 lb 5.6 oz)   02/08/21 119.6 kg (263 lb 10.7 oz)     Lab Results   Component Value Date    WBC 7.21 02/13/2023    HGB 15.5 02/13/2023    HCT 46.3 02/13/2023    MCV 88 02/13/2023     02/13/2023     CMP  Sodium   Date Value Ref Range Status   02/13/2023 140 136 - 145 mmol/L Final     Potassium   Date Value Ref Range Status   02/13/2023 4.4 3.5 - 5.1 mmol/L Final     Chloride   Date Value Ref Range Status   02/13/2023 103 95 - 110 mmol/L Final     CO2   Date Value Ref Range Status   02/13/2023 25 23 - 29 mmol/L Final     Glucose   Date Value Ref Range Status   02/13/2023 86 70 - 110 mg/dL Final     BUN   Date Value Ref Range Status   02/13/2023 12 6 - 20 mg/dL Final     Creatinine   Date Value Ref Range Status   02/13/2023 0.9 0.5 - 1.4 mg/dL Final     Calcium   Date Value Ref Range Status   02/13/2023 10.2 8.7 - 10.5 mg/dL Final     Total Protein   Date Value Ref Range Status   02/13/2023 7.8 6.0 - 8.4 g/dL Final     Albumin   Date Value Ref Range Status   05/18/2023 4.6 3.6 - 5.1 g/dL Final     Total Bilirubin   Date Value Ref Range Status   02/13/2023 0.9 0.1 - 1.0 mg/dL Final     Comment:     For infants and newborns, interpretation of results should be based  on gestational age, weight and in agreement with clinical  observations.    Premature Infant recommended reference ranges:  Up to 24 hours.............<8.0 mg/dL  Up to 48  hours............<12.0 mg/dL  3-5 days..................<15.0 mg/dL  6-29 days.................<15.0 mg/dL       Alkaline Phosphatase   Date Value Ref Range Status   02/13/2023 51 (L) 55 - 135 U/L Final     AST   Date Value Ref Range Status   02/13/2023 30 10 - 40 U/L Final     ALT   Date Value Ref Range Status   02/13/2023 53 (H) 10 - 44 U/L Final     Anion Gap   Date Value Ref Range Status   02/13/2023 12 8 - 16 mmol/L Final     eGFR if    Date Value Ref Range Status   02/01/2022 >60.0 >60 mL/min/1.73 m^2 Final     eGFR if non    Date Value Ref Range Status   02/01/2022 >60.0 >60 mL/min/1.73 m^2 Final     Comment:     Calculation used to obtain the estimated glomerular filtration  rate (eGFR) is the CKD-EPI equation.        Lab Results   Component Value Date    TSH 0.767 02/13/2023       Reviewed prior medical records including radiology report of abdominal ultrasound 02/08/2022 & endoscopy history (see surgical history).    Objective:      Physical Exam  Vitals and nursing note reviewed.   Constitutional:       General: He is not in acute distress.     Appearance: Normal appearance. He is obese. He is not ill-appearing.   HENT:      Mouth/Throat:      Lips: Pink. No lesions.   Cardiovascular:      Rate and Rhythm: Normal rate and regular rhythm.      Pulses: Normal pulses.      Heart sounds: Normal heart sounds.   Pulmonary:      Effort: Pulmonary effort is normal. No respiratory distress.      Breath sounds: Normal breath sounds.   Abdominal:      General: Abdomen is protuberant. Bowel sounds are normal. There is no distension or abdominal bruit. There are no signs of injury.      Palpations: Abdomen is soft. There is no shifting dullness, fluid wave, hepatomegaly, splenomegaly or mass.      Tenderness: There is no abdominal tenderness. There is no guarding or rebound. Negative signs include Weeks's sign, Rovsing's sign and McBurney's sign.   Skin:     General: Skin is warm and  dry.      Coloration: Skin is not jaundiced or pale.   Neurological:      Mental Status: He is alert and oriented to person, place, and time.   Psychiatric:         Attention and Perception: Attention normal.         Mood and Affect: Mood normal.         Speech: Speech normal.         Behavior: Behavior normal.       Assessment:       1. Alternating constipation and diarrhea    2. Irregular bowel habits    3. Abdominal bloating    4. Rectal bleeding    5. Suprapubic pain    6. Fatigue, unspecified type    7. Fatty liver    8. Hepatomegaly        Plan:       Alternating constipation and diarrhea & Irregular bowel habits   - schedule Colonoscopy, discussed procedure with the patient, including risks and benefits, patient verbalized understanding  - recommend OTC probiotic, such as Florastor or Culturelle, taken as directed on packaging  - avoid lactose, alcohol, & caffeine  - avoid known triggers  -Recommend high fiber diet (20-30 grams of fiber daily)/OTC fiber supplements daily as directed.  -recommend low FODMAP diet  -     H. pylori antigen, stool; Future; Expected date: 06/05/2023  -     WBC, Stool; Future; Expected date: 06/05/2023  -     Rotavirus antigen, stool; Future; Expected date: 06/05/2023  -     Adenovirus Molecular Detection, PCR, Non-Blood Stool; Future; Expected date: 06/05/2023  -     Giardia / Cryptosporidum, EIA; Future; Expected date: 06/05/2023  -     Stool Exam-Ova,Cysts,Parasites; Future; Expected date: 06/05/2023  -     Clostridium difficile EIA; Future; Expected date: 06/05/2023  -     Stool culture; Future; Expected date: 06/05/2023  -     TISSUE TRANSGLUTAMINASE (TTG), IGA; Future; Expected date: 06/05/2023  -     IGA; Future; Expected date: 06/05/2023  -     pH, stool; Future; Expected date: 06/05/2023    Abdominal bloating  - schedule Colonoscopy, discussed procedure with the patient, including risks and benefits, patient verbalized understanding  - recommend OTC simethicone as directed,  such as Phazyme or Gas-x  - recommend low gas diet: Reduce or eliminate these foods from your diet: Broccoli, Cauliflower, Gervais sprouts, Cabbage, Cooked dried beans, Carbonated beverages (sparkling water, soda, beer, champagne)  Other Causes Of Excess Gas Include:   1) EATING TOO FAST or TALKING WHILE YOU CHEW may cause you to swallow air. This increases the amount of gas in the stomach and may worsen your symptoms.  --> Chew each mouthful completely before swallowing. Take your time.  2) OVEREATING may increase the feeling of being bloated and cause more gas.  --> When you are full, stop eating.  3) CONSTIPATION can increase the amount of normal intestinal gas.  --> Avoid constipation by increasing the amount of fiber in your diet by including whole cereal grains, fresh vegetables (except those in the above list) and fresh fruits. High-fiber foods absorb water and carry it out of the body. When increasing the amount of fiber in your diet, you also need to increase the amount of water that you drink. You should drink at least eight 8-ounce glasses of water (two quarts) per day.  -try OTC Ibgard  -     X-Ray Abdomen AP 1 View; Future; Expected date: 06/05/2023  -     H. pylori antigen, stool; Future; Expected date: 06/05/2023    Rectal bleeding  - schedule Colonoscopy, discussed procedure with the patient, including risks and benefits, patient verbalized understanding  - discussed about different etiologies that can cause rectal bleeding, such as but not limited to diverticulosis, polyps, colon mass, colon inflammation or infection, anal fissure or hemorrhoids.   - You may resume normal activity as long as you feel well.  - Avoid/minimize NSAIDs such as ibuprofen (Advil, Motrin) and naproxen (Aleve and Naprosyn).  - Avoid alcohol.    Suprapubic pain  - schedule Colonoscopy, discussed procedure with the patient, including risks and benefits, patient verbalized understanding  -     X-Ray Abdomen AP 1 View; Future;  Expected date: 06/05/2023    Fatigue, unspecified type  -follow-up with PCP for continued evaluation and management     Fatty liver & Hepatomegaly   -For fatty liver recommend: low fat, low cholesterol diet, maintain good control of blood sugars and cholesterol levels, exercise, weight loss (if overweight), minimize/avoid alcohol and tylenol products, & follow-up with PCP for continued evaluation and management; if specialist is needed, recommend seeing hepatology.    Follow up in about 4 weeks (around 7/3/2023), or if symptoms worsen or fail to improve.      If no improvement in symptoms or symptoms worsen, call/follow-up at clinic or go to ER.        45 minutes of total time spent on the encounter, which includes face to face time and non-face to face time preparing to see the patient (eg, review of tests), Obtaining and/or reviewing separately obtained history, Documenting clinical information in the electronic or other health record, Independently interpreting results (not separately reported) and communicating results to the patient/family/caregiver, or Care coordination (not separately reported).     A dictation software program was used for this note. Please expect some simple typographical  errors in this note.

## 2023-06-06 ENCOUNTER — LAB VISIT (OUTPATIENT)
Dept: LAB | Facility: HOSPITAL | Age: 28
End: 2023-06-06
Payer: COMMERCIAL

## 2023-06-06 DIAGNOSIS — R19.8 ALTERNATING CONSTIPATION AND DIARRHEA: ICD-10-CM

## 2023-06-06 PROCEDURE — 82784 ASSAY IGA/IGD/IGG/IGM EACH: CPT

## 2023-06-06 PROCEDURE — 86364 TISS TRNSGLTMNASE EA IG CLAS: CPT

## 2023-06-06 PROCEDURE — 36415 COLL VENOUS BLD VENIPUNCTURE: CPT | Mod: PO

## 2023-06-07 ENCOUNTER — TELEPHONE (OUTPATIENT)
Dept: FAMILY MEDICINE | Facility: CLINIC | Age: 28
End: 2023-06-07
Payer: COMMERCIAL

## 2023-06-07 LAB — IGA SERPL-MCNC: 334 MG/DL (ref 40–350)

## 2023-06-12 LAB — TTG IGA SER-ACNC: 0.7 U/ML

## 2023-06-19 RX ORDER — HYDROCHLOROTHIAZIDE 25 MG/1
TABLET ORAL
Qty: 90 TABLET | Refills: 0 | Status: SHIPPED | OUTPATIENT
Start: 2023-06-19 | End: 2023-08-10

## 2023-06-20 ENCOUNTER — OFFICE VISIT (OUTPATIENT)
Dept: ALLERGY | Facility: CLINIC | Age: 28
End: 2023-06-20
Payer: COMMERCIAL

## 2023-06-20 VITALS — BODY MASS INDEX: 35.03 KG/M2 | WEIGHT: 264.31 LBS | HEIGHT: 73 IN

## 2023-06-20 DIAGNOSIS — L85.8 KERATOSIS PILARIS: ICD-10-CM

## 2023-06-20 DIAGNOSIS — Z91.018 MULTIPLE FOOD ALLERGIES: Primary | ICD-10-CM

## 2023-06-20 DIAGNOSIS — L30.1 DYSHIDROTIC HAND DERMATITIS: ICD-10-CM

## 2023-06-20 PROCEDURE — 99999 PR PBB SHADOW E&M-EST. PATIENT-LVL III: ICD-10-PCS | Mod: PBBFAC,,, | Performed by: STUDENT IN AN ORGANIZED HEALTH CARE EDUCATION/TRAINING PROGRAM

## 2023-06-20 PROCEDURE — 3008F PR BODY MASS INDEX (BMI) DOCUMENTED: ICD-10-PCS | Mod: CPTII,S$GLB,, | Performed by: STUDENT IN AN ORGANIZED HEALTH CARE EDUCATION/TRAINING PROGRAM

## 2023-06-20 PROCEDURE — 3044F HG A1C LEVEL LT 7.0%: CPT | Mod: CPTII,S$GLB,, | Performed by: STUDENT IN AN ORGANIZED HEALTH CARE EDUCATION/TRAINING PROGRAM

## 2023-06-20 PROCEDURE — 1159F MED LIST DOCD IN RCRD: CPT | Mod: CPTII,S$GLB,, | Performed by: STUDENT IN AN ORGANIZED HEALTH CARE EDUCATION/TRAINING PROGRAM

## 2023-06-20 PROCEDURE — 99999 PR PBB SHADOW E&M-EST. PATIENT-LVL III: CPT | Mod: PBBFAC,,, | Performed by: STUDENT IN AN ORGANIZED HEALTH CARE EDUCATION/TRAINING PROGRAM

## 2023-06-20 PROCEDURE — 1159F PR MEDICATION LIST DOCUMENTED IN MEDICAL RECORD: ICD-10-PCS | Mod: CPTII,S$GLB,, | Performed by: STUDENT IN AN ORGANIZED HEALTH CARE EDUCATION/TRAINING PROGRAM

## 2023-06-20 PROCEDURE — 99205 OFFICE O/P NEW HI 60 MIN: CPT | Mod: S$GLB,,, | Performed by: STUDENT IN AN ORGANIZED HEALTH CARE EDUCATION/TRAINING PROGRAM

## 2023-06-20 PROCEDURE — 3008F BODY MASS INDEX DOCD: CPT | Mod: CPTII,S$GLB,, | Performed by: STUDENT IN AN ORGANIZED HEALTH CARE EDUCATION/TRAINING PROGRAM

## 2023-06-20 PROCEDURE — 3044F PR MOST RECENT HEMOGLOBIN A1C LEVEL <7.0%: ICD-10-PCS | Mod: CPTII,S$GLB,, | Performed by: STUDENT IN AN ORGANIZED HEALTH CARE EDUCATION/TRAINING PROGRAM

## 2023-06-20 PROCEDURE — 99205 PR OFFICE/OUTPT VISIT, NEW, LEVL V, 60-74 MIN: ICD-10-PCS | Mod: S$GLB,,, | Performed by: STUDENT IN AN ORGANIZED HEALTH CARE EDUCATION/TRAINING PROGRAM

## 2023-06-20 RX ORDER — MOMETASONE FUROATE 1 MG/G
CREAM TOPICAL DAILY
Qty: 45 G | Refills: 6 | Status: SHIPPED | OUTPATIENT
Start: 2023-06-20 | End: 2024-02-12

## 2023-06-20 NOTE — PROGRESS NOTES
"[facility] -  NEW  PATIENT     HISTORY OF PRESENT ILLNESS     Patient ID: Tino Fu is a 28 y.o. male    CC:   Chief Complaint   Patient presents with    Allergies     Multiple food allergy concerns        HPI: Tino Fu is a 28 y.o. male presents for evaluation of:    Food Allergy Concern:   -States that certain foods have been causing stomach cramps and diarrhea episodes. Diarrhea has been occurring for 10+ years. Diarrhea has been occurring approximately once per month. Denies nausea and vomiting. Denies wheezing and shortness of breath. States that diarrhea will occur and then resolve after 1-2 episodes and is concerned about re-introduction.   Believes cow's milk products, cream-based pastas, ice cream all cause symptoms.   States that he mostly tolerates cheeses and yogurts without issues.   Denies needing to use an EpiPen. Diet includes wheat, eggs, peanut, tree nuts, soy products, shellfish and finned fish.     Recently evaluated by GI and had normal work up.     Has dry hands between fingers and small bumps to back of arms.      REVIEW OF SYSTEMS     CONST: no F/C/NS, no unintentional weight changes  DERM: no rashes, no skin breaks    Balance of review of systems negative except as mentioned above     MEDICAL HISTORY     MedHx: active problems reviewed  SurgHx: History reviewed. No pertinent surgical history.    SocHx:   -School/Work: [employer]   -Has 4 month old at home    FamHx:   Otherwise no Family History of asthma, allergic rhinitis, atopic dermatitis, drug allergy, food allergy, venom allergy or immune deficiency.     Allergies: see below  Medications: MAR reviewed       PHYSICAL EXAM     VS: Ht 6' 1" (1.854 m)   Wt 119.9 kg (264 lb 5.3 oz)   BMI 34.87 kg/m²   GENERAL: awake, alert, cooperative with exam  EYES: PERRL, EOMI, no conjunctival injection, no discharge, no infraorbital shiners  EARS: external auditory canals normal " B/L  EXTREMITIES: +2 distal pulses, no c/c/e  DERM: Dry, cracking and slight erythema between digits; Keratosis pilaris to b/l upper arms  NEURO: normal gait, no facial asymmetry     ASSESSMENT     Tino Fu is a 28 y.o. male with         1. Multiple food allergies    2. Dyshidrotic hand dermatitis    3. Keratosis pilaris           PLAN       Non-specific gastrointestinal symptoms following cow's milk consumption and derivatives. Unlikely to be IgE mediated food allergy given inconsistency in symptoms and ability to tolerate cheese, yogurts, etc. Continue follow up with GI and deferred allergy testing today  Recommended mometasone cream for hand eczema x2 weeks, message with efficacy  Recommend Amlactin lotion to arms daily         Follow up: As Needed      David Rogers MD

## 2023-07-05 ENCOUNTER — OFFICE VISIT (OUTPATIENT)
Dept: CARDIOLOGY | Facility: CLINIC | Age: 28
End: 2023-07-05
Payer: COMMERCIAL

## 2023-07-05 VITALS
WEIGHT: 262.38 LBS | HEIGHT: 73 IN | HEART RATE: 76 BPM | DIASTOLIC BLOOD PRESSURE: 90 MMHG | SYSTOLIC BLOOD PRESSURE: 118 MMHG | BODY MASS INDEX: 34.77 KG/M2

## 2023-07-05 DIAGNOSIS — E78.5 HYPERLIPIDEMIA, UNSPECIFIED HYPERLIPIDEMIA TYPE: ICD-10-CM

## 2023-07-05 DIAGNOSIS — I44.0 FIRST DEGREE AV BLOCK: ICD-10-CM

## 2023-07-05 DIAGNOSIS — R07.9 CHEST PAIN, UNSPECIFIED TYPE: Primary | ICD-10-CM

## 2023-07-05 PROCEDURE — 99204 OFFICE O/P NEW MOD 45 MIN: CPT | Mod: S$GLB,,, | Performed by: INTERNAL MEDICINE

## 2023-07-05 PROCEDURE — 99204 PR OFFICE/OUTPT VISIT, NEW, LEVL IV, 45-59 MIN: ICD-10-PCS | Mod: S$GLB,,, | Performed by: INTERNAL MEDICINE

## 2023-07-05 PROCEDURE — 3074F SYST BP LT 130 MM HG: CPT | Mod: CPTII,S$GLB,, | Performed by: INTERNAL MEDICINE

## 2023-07-05 PROCEDURE — 3080F DIAST BP >= 90 MM HG: CPT | Mod: CPTII,S$GLB,, | Performed by: INTERNAL MEDICINE

## 2023-07-05 PROCEDURE — 3074F PR MOST RECENT SYSTOLIC BLOOD PRESSURE < 130 MM HG: ICD-10-PCS | Mod: CPTII,S$GLB,, | Performed by: INTERNAL MEDICINE

## 2023-07-05 PROCEDURE — 99999 PR PBB SHADOW E&M-EST. PATIENT-LVL III: ICD-10-PCS | Mod: PBBFAC,,, | Performed by: INTERNAL MEDICINE

## 2023-07-05 PROCEDURE — 99999 PR PBB SHADOW E&M-EST. PATIENT-LVL III: CPT | Mod: PBBFAC,,, | Performed by: INTERNAL MEDICINE

## 2023-07-05 PROCEDURE — 1159F MED LIST DOCD IN RCRD: CPT | Mod: CPTII,S$GLB,, | Performed by: INTERNAL MEDICINE

## 2023-07-05 PROCEDURE — 3044F HG A1C LEVEL LT 7.0%: CPT | Mod: CPTII,S$GLB,, | Performed by: INTERNAL MEDICINE

## 2023-07-05 PROCEDURE — 93000 EKG 12-LEAD: ICD-10-PCS | Mod: S$GLB,,, | Performed by: INTERNAL MEDICINE

## 2023-07-05 PROCEDURE — 93000 ELECTROCARDIOGRAM COMPLETE: CPT | Mod: S$GLB,,, | Performed by: INTERNAL MEDICINE

## 2023-07-05 PROCEDURE — 3008F PR BODY MASS INDEX (BMI) DOCUMENTED: ICD-10-PCS | Mod: CPTII,S$GLB,, | Performed by: INTERNAL MEDICINE

## 2023-07-05 PROCEDURE — 3044F PR MOST RECENT HEMOGLOBIN A1C LEVEL <7.0%: ICD-10-PCS | Mod: CPTII,S$GLB,, | Performed by: INTERNAL MEDICINE

## 2023-07-05 PROCEDURE — 3008F BODY MASS INDEX DOCD: CPT | Mod: CPTII,S$GLB,, | Performed by: INTERNAL MEDICINE

## 2023-07-05 PROCEDURE — 1159F PR MEDICATION LIST DOCUMENTED IN MEDICAL RECORD: ICD-10-PCS | Mod: CPTII,S$GLB,, | Performed by: INTERNAL MEDICINE

## 2023-07-05 PROCEDURE — 3080F PR MOST RECENT DIASTOLIC BLOOD PRESSURE >= 90 MM HG: ICD-10-PCS | Mod: CPTII,S$GLB,, | Performed by: INTERNAL MEDICINE

## 2023-07-05 NOTE — PROGRESS NOTES
Subjective:    Patient ID:  Tino Fu is a 28 y.o. male patient here for evaluation Chest Pain      History of Present Illness:     28-year-old male with past medical history of hypertension, hyperlipidemia referred by primary care for evaluation of chest pain and first-degree AV block on EKG.  He had intermittent episodes of left upper chest pain, shoulder pain, arm pain for last several months.  Occurring approximately once a week, feels as dull sensation, with random onset and resolution, unrelated to exertion, with no associated symptoms and with no aggravating/relieving factors.  Pain sometimes occurred after eating food.  Sometimes lasts up to several hours and resolves on its own.  Last episode 1 week ago.  Denies any history of heart disease in the past.  Family history of cardiomegaly/congestive heart failure in parents.  He is scheduled for colonoscopy next month. No family history of sudden cardiac death.  Has history of hypertension however not taking hydrochlorothiazide for a month now.  History of hyperlipidemia and currently trying lifestyle modifications.  Denies palpitations, dizziness, syncope.         Review of patient's allergies indicates:  No Known Allergies    Past Medical History:   Diagnosis Date    Hypertension      No past surgical history on file.  Social History     Tobacco Use    Smoking status: Never    Smokeless tobacco: Never   Substance Use Topics    Alcohol use: Yes     Comment: 4 times a year    Drug use: Not Currently        Review of Systems   Negative except as mentioned in HPI         Objective        Vitals:    07/05/23 1522   BP: (!) 118/90   Pulse: 76       LIPIDS - LAST 2   Lab Results   Component Value Date    CHOL 229 (H) 02/13/2023    CHOL 214 (H) 02/01/2022    HDL 53 02/13/2023    HDL 65 02/01/2022    LDLCALC 137.8 02/13/2023    LDLCALC 131.2 02/01/2022    TRIG 191 (H) 02/13/2023    TRIG 89 02/01/2022    CHOLHDL 23.1 02/13/2023    CHOLHDL 30.4 02/01/2022        CBC - LAST 2  Lab Results   Component Value Date    WBC 7.21 02/13/2023    WBC 6.66 02/01/2022    RBC 5.27 02/13/2023    RBC 5.05 02/01/2022    HGB 15.5 02/13/2023    HGB 15.1 02/01/2022    HCT 46.3 02/13/2023    HCT 45.6 02/01/2022    MCV 88 02/13/2023    MCV 90 02/01/2022    MCH 29.4 02/13/2023    MCH 29.9 02/01/2022    MCHC 33.5 02/13/2023    MCHC 33.1 02/01/2022    RDW 11.1 (L) 02/13/2023    RDW 11.4 (L) 02/01/2022     02/13/2023     02/01/2022    MPV 10.1 02/13/2023    MPV 10.5 02/01/2022    GRAN 3.6 02/13/2023    GRAN 49.5 02/13/2023    LYMPH 2.6 02/13/2023    LYMPH 36.6 02/13/2023    MONO 0.8 02/13/2023    MONO 11.5 02/13/2023    BASO 0.06 02/13/2023    BASO 0.03 02/01/2022    NRBC 0 02/13/2023    NRBC 0 02/01/2022       CHEMISTRY & LIVER FUNCTION - LAST 2  Lab Results   Component Value Date     02/13/2023     02/01/2022    K 4.4 02/13/2023    K 4.1 02/01/2022     02/13/2023     02/01/2022    CO2 25 02/13/2023    CO2 28 02/01/2022    ANIONGAP 12 02/13/2023    ANIONGAP 12 02/01/2022    BUN 12 02/13/2023    BUN 9 02/01/2022    CREATININE 0.9 02/13/2023    CREATININE 0.7 02/01/2022    GLU 86 02/13/2023    GLU 84 02/01/2022    CALCIUM 10.2 02/13/2023    CALCIUM 10.1 02/01/2022    ALBUMIN 4.6 05/18/2023    ALBUMIN 4.5 02/13/2023    PROT 7.8 02/13/2023    PROT 7.9 02/01/2022    ALKPHOS 51 (L) 02/13/2023    ALKPHOS 55 02/01/2022    ALT 53 (H) 02/13/2023    ALT 59 (H) 02/01/2022    AST 30 02/13/2023    AST 32 02/01/2022    BILITOT 0.9 02/13/2023    BILITOT 1.2 (H) 02/01/2022        CARDIAC PROFILE - LAST 2  No results found for: BNP, CPK, CPKMB, LDH, TROPONINI, TROPONINIHS     COAGULATION - LAST 2  No results found for: LABPT, INR, APTT    ENDOCRINE & PSA - LAST 2  Lab Results   Component Value Date    HGBA1C 5.4 02/13/2023    HGBA1C 5.3 02/13/2020    TSH 0.767 02/13/2023    TSH 0.757 02/13/2020        ECHOCARDIOGRAM RESULTS  No results found for this or any previous  visit.      CURRENT/PREVIOUS VISIT EKG  Results for orders placed or performed in visit on 06/02/23   IN OFFICE EKG 12-LEAD (to [facility])    Collection Time: 06/02/23  1:25 PM    Narrative    Test Reason : R07.89,    Vent. Rate : 080 BPM     Atrial Rate : 080 BPM     P-R Int : 212 ms          QRS Dur : 080 ms      QT Int : 346 ms       P-R-T Axes : 080 -10 069 degrees     QTc Int : 399 ms    Sinus rhythm with 1st degree A-V block  Otherwise normal ECG  No previous ECGs available  Confirmed by Tino Pompa MD ([id number]) on 6/2/2023 2:39:13 PM    Referred By: Gera Monge           Confirmed By:Tino Pompa MD     No valid procedures specified.   No results found for this or any previous visit.    No valid procedures specified.          PREVIOUS STRESS TEST              PREVIOUS ANGIOGRAM        PHYSICAL EXAM    CONSTITUTIONAL: Well built, well nourished in no apparent distress  HEENT: No pallor  NECK: no JVD  LUNGS: CTA b/l  HEART: regular rate and rhythm, S1, S2 normal, no murmur   ABDOMEN: soft, non-tender; bowel sounds normal  EXTREMITIES: No edema  NEURO: AAO X 3   SKIN:  No rash  Psych:  Normal affect    I HAVE REVIEWED :    The vital signs, nurses notes, and all the pertinent radiology and labs.        Current Outpatient Medications   Medication Instructions    hydroCHLOROthiazide (HYDRODIURIL) 25 MG tablet TAKE 1 TABLET DAILY    mometasone 0.1% (ELOCON) 0.1 % cream Topical (Top), Daily        ECG reviewed by me; sinus rhythm, first-degree AV block  Assessment & Plan     28-year-old male with past medical history of hypertension, hyperlipidemia referred by primary care for evaluation of chest pain and first-degree AV block on EKG.  He had intermittent episodes of left upper chest pain, shoulder pain, arm pain for last several months.  Occurring approximately once a week, feels as dull sensation, with random onset and resolution, unrelated to exertion, with no associated symptoms and with no aggravating/relieving factors.   Pain sometimes occurred after eating food.  Sometimes lasts up to several hours and resolves on its own.  Last episode 1 week ago.  Denies any history of heart disease in the past.  Family history of cardiomegaly/congestive heart failure in parents.  He is scheduled for colonoscopy next month. No family history of sudden cardiac death.  Has history of hypertension however not taking hydrochlorothiazide for a month now.  History of hyperlipidemia and currently trying lifestyle modifications.  Denies palpitations, dizziness, syncope.     Chest pain-atypical: ?  GI related  EKG-sinus rhythm, first-degree AV block, no ischemic changes  Echocardiogram and stress echocardiogram for further evaluation  Advised patient to go to the ER in case of recurrent/worsening symptoms.    Hypertension- off hydrochlorothiazide currently.  Advised home BP monitoring and record the BP log    Dyslipidemia- following lifestyle modifications.  Repeat lipid profile, check direct LDL, HS CRP Apo A1 and B, Lpa for further evaluation      Follow up in about 4 weeks (around 8/2/2023).

## 2023-07-17 ENCOUNTER — OFFICE VISIT (OUTPATIENT)
Dept: NEUROLOGY | Facility: CLINIC | Age: 28
End: 2023-07-17
Payer: COMMERCIAL

## 2023-07-17 VITALS
RESPIRATION RATE: 16 BRPM | WEIGHT: 266.31 LBS | SYSTOLIC BLOOD PRESSURE: 126 MMHG | DIASTOLIC BLOOD PRESSURE: 79 MMHG | HEIGHT: 73 IN | HEART RATE: 80 BPM | BODY MASS INDEX: 35.3 KG/M2

## 2023-07-17 DIAGNOSIS — Z82.49 FAMILY HISTORY OF ANEURYSM: ICD-10-CM

## 2023-07-17 DIAGNOSIS — R51.9 WORSENING HEADACHES: Primary | ICD-10-CM

## 2023-07-17 DIAGNOSIS — R51.9 NONINTRACTABLE HEADACHE, UNSPECIFIED CHRONICITY PATTERN, UNSPECIFIED HEADACHE TYPE: ICD-10-CM

## 2023-07-17 DIAGNOSIS — G44.84 EXERTIONAL HEADACHE: ICD-10-CM

## 2023-07-17 PROCEDURE — 3074F SYST BP LT 130 MM HG: CPT | Mod: CPTII,S$GLB,, | Performed by: PHYSICIAN ASSISTANT

## 2023-07-17 PROCEDURE — 1159F MED LIST DOCD IN RCRD: CPT | Mod: CPTII,S$GLB,, | Performed by: PHYSICIAN ASSISTANT

## 2023-07-17 PROCEDURE — 3008F PR BODY MASS INDEX (BMI) DOCUMENTED: ICD-10-PCS | Mod: CPTII,S$GLB,, | Performed by: PHYSICIAN ASSISTANT

## 2023-07-17 PROCEDURE — 3044F HG A1C LEVEL LT 7.0%: CPT | Mod: CPTII,S$GLB,, | Performed by: PHYSICIAN ASSISTANT

## 2023-07-17 PROCEDURE — 1159F PR MEDICATION LIST DOCUMENTED IN MEDICAL RECORD: ICD-10-PCS | Mod: CPTII,S$GLB,, | Performed by: PHYSICIAN ASSISTANT

## 2023-07-17 PROCEDURE — 3008F BODY MASS INDEX DOCD: CPT | Mod: CPTII,S$GLB,, | Performed by: PHYSICIAN ASSISTANT

## 2023-07-17 PROCEDURE — 1160F PR REVIEW ALL MEDS BY PRESCRIBER/CLIN PHARMACIST DOCUMENTED: ICD-10-PCS | Mod: CPTII,S$GLB,, | Performed by: PHYSICIAN ASSISTANT

## 2023-07-17 PROCEDURE — 3078F PR MOST RECENT DIASTOLIC BLOOD PRESSURE < 80 MM HG: ICD-10-PCS | Mod: CPTII,S$GLB,, | Performed by: PHYSICIAN ASSISTANT

## 2023-07-17 PROCEDURE — 99204 PR OFFICE/OUTPT VISIT, NEW, LEVL IV, 45-59 MIN: ICD-10-PCS | Mod: S$GLB,,, | Performed by: PHYSICIAN ASSISTANT

## 2023-07-17 PROCEDURE — 99204 OFFICE O/P NEW MOD 45 MIN: CPT | Mod: S$GLB,,, | Performed by: PHYSICIAN ASSISTANT

## 2023-07-17 PROCEDURE — 99999 PR PBB SHADOW E&M-EST. PATIENT-LVL IV: CPT | Mod: PBBFAC,,, | Performed by: PHYSICIAN ASSISTANT

## 2023-07-17 PROCEDURE — 99999 PR PBB SHADOW E&M-EST. PATIENT-LVL IV: ICD-10-PCS | Mod: PBBFAC,,, | Performed by: PHYSICIAN ASSISTANT

## 2023-07-17 PROCEDURE — 1160F RVW MEDS BY RX/DR IN RCRD: CPT | Mod: CPTII,S$GLB,, | Performed by: PHYSICIAN ASSISTANT

## 2023-07-17 PROCEDURE — 3044F PR MOST RECENT HEMOGLOBIN A1C LEVEL <7.0%: ICD-10-PCS | Mod: CPTII,S$GLB,, | Performed by: PHYSICIAN ASSISTANT

## 2023-07-17 PROCEDURE — 3074F PR MOST RECENT SYSTOLIC BLOOD PRESSURE < 130 MM HG: ICD-10-PCS | Mod: CPTII,S$GLB,, | Performed by: PHYSICIAN ASSISTANT

## 2023-07-17 PROCEDURE — 3078F DIAST BP <80 MM HG: CPT | Mod: CPTII,S$GLB,, | Performed by: PHYSICIAN ASSISTANT

## 2023-07-17 RX ORDER — AMOXICILLIN 500 MG
CAPSULE ORAL DAILY
COMMUNITY

## 2023-07-17 NOTE — PROGRESS NOTES
[facility]  [address] [place] [address]  Phone:[phone]  Fax: [phone]   New Patient Consultation    Patient Name: Tino Fu  : 1995  MRN:  [id number]  Today: 2023   chief complaint: Headache    PCP: Gera Monge MD.  Approx time examiner entered room: 10:17 AM    Approx time examiner departed room:10:51 AM  Approx 10 mins pre/post visit charting     Assessment:   Tino Fu is a 28 y.o. right handed male  with a PMHx of: HTN, HLD, low T, HA and CP (1st degree AV block),    whom presents solo at the request of the PCP for HA. 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 Given his family hx of stroke and aneurysm, and that he is noticing HA more frequent, its quite reasonable to check for vascular or structural pathology whilst he continues to have his GI work up/makes life style changes.       Review:    ICD-10-CM ICD-9-CM   1. Worsening headaches  R51.9 784.0   2. Exertional headache  G44.84 784.0   3. Family history of aneurysm  Z82.49 V19.8   4. Nonintractable headache, unspecified chronicity pattern, unspecified headache type  R51.9 784.0   #4 is referral diagnosis       Plan:   Discussed realistic goals of care with patient at length. Discussed medication options, need for lifestyle adjustment. Discussed treatment will take time. Goal will be to reduce frequency/intensity/quantity of HA, not to be completely HA free. Gave copy of [facility] triggers for migraine informational sheet (N.b., a standard I give to patients who come to seek my care in HA clinic, regardless if they have migraines or not) and discussed clinic's non narcotic policy re: HA. Patient voiced understanding and agreement.               -will have patient work on lifestyle including hydration            -ordered imaging studies and labs, discussed procedure at lengths, I will comment on findings electronically -all  "questions answered                         -if any pathology found, will refer on to the appropriate provider                For HA Prevention:  Suggested that the patient research out OTC supplements (stressed NOT FDA regulated and should take at own risk).    To help prevent a headache:   Petadolex (butterbur root)  Vitamin B2 (riboflavin)  CoQ10  Magnesium  "Migraeeze" which is petadolex/Vitamin B2/driss  "Dolovent" which is magnesium/Vitamin B2/coq10    * all of the above can be found locally in a variety of shops or on the internet       For HA :  OTC if SZYMANSKI persists     To break up Headaches:  N/A     Other:  N/a           All test results as well as any necessary instructions were reviewed and discussed with patient.    Review:  Orders Placed This Encounter    MRI Brain W WO Contrast    MRA Brain    Comprehensive metabolic panel         Patient to return to PCP/other specialists for all other problems  Patient to continue on all medications as Rx'd   A detailed AVS was provided to the patient with patient readback   RTO- PRN, I will comment on findings electronically   The patient indicates understanding of these issues and agrees to the plan.    HPI:   Tino Fu is a 28 y.o.right handed, male with a PMHx of: HTN, HLD, low T, HA and CP (1st degree AV block),    whom presents solo at the request of the PCP for HA    HA HPI:  Start:at least 10 years has noticed more recently, only when "straining" to defecate by report. "I can run, and not feel it, its just straining." Has seen GI and has planned colonoscopy. 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    Tried:none   Triggers (in bold):strenuous activity (valsalva)-like a bowel movement,  stress, lack of sleep, too much caffeine, too little caffeine, weather change, seasonal change, strong odours, bright lights, sunlight, food   Last HA: within past week   Positives in bold: Hx of Kidney Stones, asthma, GI bleed, osteoporosis, CAD/MI, CVA/TIA, DM  <---denies   Imaging on file: none   Therapies tried in past: (failures to be marked, if known---why did it fail?)  Hctz  Robaxin  Toradol  Lexapro        Medication Reconciliation:   Current Outpatient Medications   Medication Sig Dispense Refill    hydroCHLOROthiazide (HYDRODIURIL) 25 MG tablet TAKE 1 TABLET DAILY (Patient not taking: Reported on 6/20/2023) 90 tablet 0    mometasone 0.1% (ELOCON) 0.1 % cream Apply topically once daily. 45 g 6     No current facility-administered medications for this visit.     Review of patient's allergies indicates:  No Known Allergies    PMHx:  Past Medical History:   Diagnosis Date    Hypertension      No past surgical history on file.    Fhx:maternal grandfather -aneurysm   Family History   Problem Relation Age of Onset    Hypertension Mother     Other Mother         fetal alcohol syndrome ?    Hypertension Father     Stroke Father     Heart failure Father     Diabetes Paternal Grandfather     Colon cancer Neg Hx     Colon polyps Neg Hx     Crohn's disease Neg Hx     Stomach cancer Neg Hx     Ulcerative colitis Neg Hx     Esophageal cancer Neg Hx        Shx: healthy and safety for [employer], has 6 month old son   Social History     Socioeconomic History    Marital status:    Tobacco Use    Smoking status: Never    Smokeless tobacco: Never   Substance and Sexual Activity    Alcohol use: Yes     Comment: 4 times a year    Drug use: Not Currently    Sexual activity: Yes     Partners:  Female     Birth control/protection: None     Social Determinants of Health     Financial Resource Strain: Low Risk     Difficulty of Paying Living Expenses: Not hard at all   Food Insecurity: No Food Insecurity    Worried About Running Out of Food in the Last Year: Never true    Ran Out of Food in the Last Year: Never true   Transportation Needs: No Transportation Needs    Lack of Transportation (Medical): No    Lack of Transportation (Non-Medical): No   Physical Activity: Insufficiently Active    Days of Exercise per Week: 1 day    Minutes of Exercise per Session: 30 min   Stress: No Stress Concern Present    Feeling of Stress : Not at all   Social Connections: Unknown    Frequency of Communication with Friends and Family: More than three times a week    Frequency of Social Gatherings with Friends and Family: More than three times a week    Active Member of Clubs or Organizations: No    Attends Club or Organization Meetings: Never    Marital Status:    Housing Stability: Low Risk     Unable to Pay for Housing in the Last Year: No    Number of Places Lived in the Last Year: 1    Unstable Housing in the Last Year: No           Labs:   Reviewed in chart     Imaging:   Reviewed in chart       Other testing:  Reviewed in chart     Note: I have independently reviewed any/all imaging/labs/tests and agree with the report (s) as documented.  Any discrepancies will be as noted/demarcated by free text.  LUANA KHOURY 7/17/2023                     ROS:   Review Of Systems (questions asked, positive or additions in BOLD)  Gen: Weight change, fatigue/malaise, pyrexia   HEENT: Tinnitus, headache,  blurred vision, eye pain, diplopia, photophobia,  nose bleeds, congestion, sore throat, jaw pain, scalp pain, neck stiffness   Card: Palpitations, CP   Pulm: SOB, cough   Vas: Easy bruising, easy bleeding   GI: N/V/D/C, incontinence, hematemesis, hematochezia    : incontinence, hematuria   Endocrine: Temp intolerance, polyuria,  "polydipsia   M/S: Neck pain, myalgia, back pain, joint pain, falls    Neuro: PER HPI   PSY: Memory loss, confusion, depression, anxiety, trouble in sleep          EXAM:   /79 (BP Location: Right arm, Patient Position: Sitting, BP Method: Large (Automatic))   Pulse 80   Resp 16   Ht 6' 1" (1.854 m)   Wt 120.8 kg (266 lb 5.1 oz)   BMI 35.14 kg/m²    GEN:  NAD  HEENT: NC/AT, Frontalis was NTTP, temporalis was NTTP,  nares patent, dentition appropriate,  neck supple, trachea midline, Occiput and trapezius NTTP     EXTREM:   no edema present.    NEURO:  Mental Status:  Awake, alert and appropriately oriented to time, place, and person.  Normal attention and concentration.  Speech is fluent and appropriate language function (I.e., comprehension).     Cranial Nerves:    Pupils are equal and reactive to light.  Extraocular movements are intact and without nystagmus.  Visual fields are full to confrontation testing Facial movement is symmetric.  Facial sensation is intact.  Hearing is normal. Uvula in midline. FROM of neck in all (6) directions, shoulder shrug symmetrical. Tongue in midline without fasiculation.     Motor:  RUE:appropriate against gravity and medium force as tested 5/5              LUE: appropriate against gravity and medium force as tested 5/5              RLE:appropriate against gravity and medium force as tested 5/5              LLE: appropriate against gravity and medium force as tested 5/5  Tremor/pronator drift not apparent.    finger extension strength was strong bilat     Sensory:  RUE  intact light touch, proprioception, and temperature  LUE intact light touch, proprioception, and temperature    RLE intact light touch  LLE intact light touch      DTR's:                                            R              L  biceps 2+ 2+         brachioradialis 2+ 2+   Knee jerk 2+ 2+        Coordination:  FTN-WNL.       Gait and Stance: Normal manner of stance and gait function testing. Romberg was " negative.          This document has been electronically signed by  Balaji LINKaycee Anderson MPA, PA-C on 7/17/2023, I have personally typed this message using the EMR.       Dr Yang MD was available during today's visit.     Personal Protective Equipment:    Personal Protective Equipment was used during this encounter including:    non latex gloves.          CC: Gera Monge MD

## 2023-07-17 NOTE — PATIENT INSTRUCTIONS
"        Suggested that the patient research out OTC supplements (stressed NOT FDA regulated and should take at own risk).    To help prevent a headache:   Petadolex (butterbur root)  Vitamin B2 (riboflavin)  CoQ10  Magnesium  "Migraeeze" which is petadolex/Vitamin B2/driss  "Dolovent" which is magnesium/Vitamin B2/coq10    * all of the above can be found locally in a variety of shops or on the internet        "

## 2023-07-28 ENCOUNTER — TELEPHONE (OUTPATIENT)
Dept: CARDIOLOGY | Facility: HOSPITAL | Age: 28
End: 2023-07-28

## 2023-07-31 ENCOUNTER — CLINICAL SUPPORT (OUTPATIENT)
Dept: CARDIOLOGY | Facility: HOSPITAL | Age: 28
End: 2023-07-31
Attending: INTERNAL MEDICINE
Payer: COMMERCIAL

## 2023-07-31 VITALS — HEIGHT: 73 IN | BODY MASS INDEX: 35.3 KG/M2 | WEIGHT: 266.31 LBS

## 2023-07-31 DIAGNOSIS — R07.9 CHEST PAIN, UNSPECIFIED TYPE: ICD-10-CM

## 2023-07-31 LAB
AORTIC ROOT ANNULUS: 3 CM
AORTIC VALVE CUSP SEPERATION: 1.8 CM
BSA FOR ECHO PROCEDURE: 2.49 M2
CV ECHO LV RWT: 0.47 CM
CV STRESS BASE HR: 72 BPM
DIASTOLIC BLOOD PRESSURE: 82 MMHG
DOP CALC LVOT AREA: 4.2 CM2
DOP CALC LVOT DIAMETER: 2.3 CM
DOP CALC MV VTI: 19.7 CM
E WAVE DECELERATION TIME: 204 MSEC
E/A RATIO: 1.3
E/E' RATIO: 3.53 M/S
ECHO LV POSTERIOR WALL: 1.07 CM (ref 0.6–1.1)
FRACTIONAL SHORTENING: 35 % (ref 28–44)
INTERVENTRICULAR SEPTUM: 1.13 CM (ref 0.6–1.1)
IVC DIAMETER: 1.58 CM
LEFT INTERNAL DIMENSION IN SYSTOLE: 2.96 CM (ref 2.1–4)
LEFT VENTRICLE DIASTOLIC VOLUME INDEX: 39.63 ML/M2
LEFT VENTRICLE DIASTOLIC VOLUME: 96.3 ML
LEFT VENTRICLE MASS INDEX: 74 G/M2
LEFT VENTRICLE SYSTOLIC VOLUME INDEX: 14 ML/M2
LEFT VENTRICLE SYSTOLIC VOLUME: 33.9 ML
LEFT VENTRICULAR INTERNAL DIMENSION IN DIASTOLE: 4.58 CM (ref 3.5–6)
LEFT VENTRICULAR MASS: 179.97 G
LV LATERAL E/E' RATIO: 2.86 M/S
LV SEPTAL E/E' RATIO: 4.62 M/S
MV MEAN GRADIENT: 1 MMHG
MV PEAK A VEL: 0.46 M/S
MV PEAK E VEL: 0.6 M/S
MV PEAK GRADIENT: 2 MMHG
OHS CV CPX 1 MINUTE RECOVERY HEART RATE: 141 BPM
OHS CV CPX 85 PERCENT MAX PREDICTED HEART RATE MALE: 162
OHS CV CPX ESTIMATED METS: 9.8
OHS CV CPX MAX PREDICTED HEART RATE: 190
OHS CV CPX PATIENT IS FEMALE: 0
OHS CV CPX PATIENT IS MALE: 1
OHS CV CPX PEAK DIASTOLIC BLOOD PRESSURE: 72 MMHG
OHS CV CPX PEAK HEAR RATE: 141 BPM
OHS CV CPX PEAK RATE PRESSURE PRODUCT: NORMAL
OHS CV CPX PEAK SYSTOLIC BLOOD PRESSURE: 250 MMHG
OHS CV CPX PERCENT MAX PREDICTED HEART RATE ACHIEVED: 74
OHS CV CPX RATE PRESSURE PRODUCT PRESENTING: NORMAL
PISA MRMAX VEL: 1.2 M/S
PISA TR MAX VEL: 1.13 M/S
POST STRESS EJECTION FRACTION: 65 %
PV MV: 0.77 M/S
PV PEAK GRADIENT: 5 MMHG
PV PEAK VELOCITY: 1.11 M/S
RA PRESSURE ESTIMATED: 3 MMHG
RV TB RVSP: 4 MMHG
RV TISSUE DOPPLER FREE WALL SYSTOLIC VELOCITY 1 (APICAL 4 CHAMBER VIEW): 13.7 CM/S
STRESS ECHO POST EXERCISE DUR MIN: 7 MINUTES
STRESS ECHO POST EXERCISE DUR SEC: 30 SECONDS
SYSTOLIC BLOOD PRESSURE: 151 MMHG
TDI LATERAL: 0.21 M/S
TDI SEPTAL: 0.13 M/S
TDI: 0.17 M/S
TR MAX PG: 5 MMHG
TRICUSPID ANNULAR PLANE SYSTOLIC EXCURSION: 3.05 CM
Z-SCORE OF LEFT VENTRICULAR DIMENSION IN END DIASTOLE: -9.39
Z-SCORE OF LEFT VENTRICULAR DIMENSION IN END SYSTOLE: -6.8

## 2023-07-31 PROCEDURE — 93351 STRESS TTE COMPLETE: CPT

## 2023-07-31 PROCEDURE — 93306 TTE W/DOPPLER COMPLETE: CPT | Mod: 59

## 2023-07-31 PROCEDURE — 93351 STRESS ECHO (CUPID ONLY): ICD-10-PCS | Mod: 26,,, | Performed by: INTERNAL MEDICINE

## 2023-07-31 PROCEDURE — 93306 ECHO (CUPID ONLY): ICD-10-PCS | Mod: 26,59,, | Performed by: INTERNAL MEDICINE

## 2023-07-31 PROCEDURE — 93351 STRESS TTE COMPLETE: CPT | Mod: 26,,, | Performed by: INTERNAL MEDICINE

## 2023-07-31 PROCEDURE — 93306 TTE W/DOPPLER COMPLETE: CPT | Mod: 26,59,, | Performed by: INTERNAL MEDICINE

## 2023-08-03 ENCOUNTER — HOSPITAL ENCOUNTER (OUTPATIENT)
Dept: RADIOLOGY | Facility: HOSPITAL | Age: 28
Discharge: HOME | End: 2023-08-03
Attending: PHYSICIAN ASSISTANT
Payer: COMMERCIAL

## 2023-08-03 DIAGNOSIS — R51.9 WORSENING HEADACHES: ICD-10-CM

## 2023-08-03 DIAGNOSIS — G44.84 EXERTIONAL HEADACHE: ICD-10-CM

## 2023-08-03 DIAGNOSIS — Z82.49 FAMILY HISTORY OF ANEURYSM: ICD-10-CM

## 2023-08-03 LAB
CREAT SERPL-MCNC: 0.8 MG/DL (ref 0.5–1.4)
SAMPLE: NORMAL

## 2023-08-03 PROCEDURE — 70553 MRI BRAIN STEM W/O & W/DYE: CPT | Mod: TC,PO

## 2023-08-03 PROCEDURE — 70544 MR ANGIOGRAPHY HEAD W/O DYE: CPT | Mod: TC,PO

## 2023-08-03 PROCEDURE — A9585 GADOBUTROL INJECTION: HCPCS | Mod: PO | Performed by: PHYSICIAN ASSISTANT

## 2023-08-03 PROCEDURE — 25500020 PHARM REV CODE 255: Mod: PO | Performed by: PHYSICIAN ASSISTANT

## 2023-08-03 RX ORDER — GADOBUTROL 604.72 MG/ML
12.5 INJECTION INTRAVENOUS
Status: COMPLETED | OUTPATIENT
Start: 2023-08-03 | End: 2023-08-03

## 2023-08-03 RX ADMIN — GADOBUTROL 12.5 ML: 604.72 INJECTION INTRAVENOUS at 03:08

## 2023-08-04 ENCOUNTER — PATIENT MESSAGE (OUTPATIENT)
Dept: NEUROLOGY | Facility: CLINIC | Age: 28
End: 2023-08-04
Payer: COMMERCIAL

## 2023-08-04 DIAGNOSIS — R90.89 ABNORMAL FINDING ON MRI OF BRAIN: Primary | ICD-10-CM

## 2023-08-05 ENCOUNTER — LAB VISIT (OUTPATIENT)
Dept: LAB | Facility: HOSPITAL | Age: 28
End: 2023-08-05
Attending: INTERNAL MEDICINE
Payer: COMMERCIAL

## 2023-08-05 DIAGNOSIS — E78.5 HYPERLIPIDEMIA, UNSPECIFIED HYPERLIPIDEMIA TYPE: ICD-10-CM

## 2023-08-05 LAB
CHOLEST SERPL-MCNC: 213 MG/DL (ref 120–199)
CHOLEST/HDLC SERPL: 4.1 {RATIO} (ref 2–5)
CRP SERPL-MCNC: 3.4 MG/L (ref 0–3.19)
HDLC SERPL-MCNC: 52 MG/DL (ref 40–75)
HDLC SERPL: 24.4 % (ref 20–50)
LDLC SERPL CALC-MCNC: 126.2 MG/DL (ref 63–159)
NONHDLC SERPL-MCNC: 161 MG/DL
TRIGL SERPL-MCNC: 174 MG/DL (ref 30–150)

## 2023-08-05 PROCEDURE — 83701 LIPOPROTEIN BLD HR FRACTION: CPT | Performed by: INTERNAL MEDICINE

## 2023-08-05 PROCEDURE — 86141 C-REACTIVE PROTEIN HS: CPT | Performed by: INTERNAL MEDICINE

## 2023-08-05 PROCEDURE — 83695 ASSAY OF LIPOPROTEIN(A): CPT | Performed by: INTERNAL MEDICINE

## 2023-08-05 PROCEDURE — 82172 ASSAY OF APOLIPOPROTEIN: CPT | Performed by: INTERNAL MEDICINE

## 2023-08-05 PROCEDURE — 80061 LIPID PANEL: CPT | Performed by: INTERNAL MEDICINE

## 2023-08-08 ENCOUNTER — TELEPHONE (OUTPATIENT)
Dept: NEUROSURGERY | Facility: CLINIC | Age: 28
End: 2023-08-08
Payer: COMMERCIAL

## 2023-08-09 LAB
APO A-I SERPL-MCNC: 145 MG/DL
APO B SERPL-MCNC: 115 MG/DL
APOLIPOPROTEIN B/A1 RATIO: 0.8
LDLC SERPL-MCNC: 139 MG/DL

## 2023-08-10 ENCOUNTER — OFFICE VISIT (OUTPATIENT)
Dept: CARDIOLOGY | Facility: CLINIC | Age: 28
End: 2023-08-10
Payer: COMMERCIAL

## 2023-08-10 VITALS
SYSTOLIC BLOOD PRESSURE: 149 MMHG | HEART RATE: 89 BPM | DIASTOLIC BLOOD PRESSURE: 87 MMHG | OXYGEN SATURATION: 98 % | BODY MASS INDEX: 34.99 KG/M2 | WEIGHT: 265.19 LBS

## 2023-08-10 DIAGNOSIS — E78.5 HYPERLIPIDEMIA, UNSPECIFIED HYPERLIPIDEMIA TYPE: Primary | ICD-10-CM

## 2023-08-10 DIAGNOSIS — R07.9 CHEST PAIN, UNSPECIFIED TYPE: ICD-10-CM

## 2023-08-10 LAB — LPA SERPL-MCNC: 12 MG/DL (ref 0–30)

## 2023-08-10 PROCEDURE — 1159F MED LIST DOCD IN RCRD: CPT | Mod: CPTII,S$GLB,, | Performed by: INTERNAL MEDICINE

## 2023-08-10 PROCEDURE — 1159F PR MEDICATION LIST DOCUMENTED IN MEDICAL RECORD: ICD-10-PCS | Mod: CPTII,S$GLB,, | Performed by: INTERNAL MEDICINE

## 2023-08-10 PROCEDURE — 3044F HG A1C LEVEL LT 7.0%: CPT | Mod: CPTII,S$GLB,, | Performed by: INTERNAL MEDICINE

## 2023-08-10 PROCEDURE — 3077F PR MOST RECENT SYSTOLIC BLOOD PRESSURE >= 140 MM HG: ICD-10-PCS | Mod: CPTII,S$GLB,, | Performed by: INTERNAL MEDICINE

## 2023-08-10 PROCEDURE — 99214 PR OFFICE/OUTPT VISIT, EST, LEVL IV, 30-39 MIN: ICD-10-PCS | Mod: S$GLB,,, | Performed by: INTERNAL MEDICINE

## 2023-08-10 PROCEDURE — 3008F BODY MASS INDEX DOCD: CPT | Mod: CPTII,S$GLB,, | Performed by: INTERNAL MEDICINE

## 2023-08-10 PROCEDURE — 3044F PR MOST RECENT HEMOGLOBIN A1C LEVEL <7.0%: ICD-10-PCS | Mod: CPTII,S$GLB,, | Performed by: INTERNAL MEDICINE

## 2023-08-10 PROCEDURE — 99999 PR PBB SHADOW E&M-EST. PATIENT-LVL III: ICD-10-PCS | Mod: PBBFAC,,, | Performed by: INTERNAL MEDICINE

## 2023-08-10 PROCEDURE — 3077F SYST BP >= 140 MM HG: CPT | Mod: CPTII,S$GLB,, | Performed by: INTERNAL MEDICINE

## 2023-08-10 PROCEDURE — 99999 PR PBB SHADOW E&M-EST. PATIENT-LVL III: CPT | Mod: PBBFAC,,, | Performed by: INTERNAL MEDICINE

## 2023-08-10 PROCEDURE — 3008F PR BODY MASS INDEX (BMI) DOCUMENTED: ICD-10-PCS | Mod: CPTII,S$GLB,, | Performed by: INTERNAL MEDICINE

## 2023-08-10 PROCEDURE — 99214 OFFICE O/P EST MOD 30 MIN: CPT | Mod: S$GLB,,, | Performed by: INTERNAL MEDICINE

## 2023-08-10 PROCEDURE — 3079F DIAST BP 80-89 MM HG: CPT | Mod: CPTII,S$GLB,, | Performed by: INTERNAL MEDICINE

## 2023-08-10 PROCEDURE — 3079F PR MOST RECENT DIASTOLIC BLOOD PRESSURE 80-89 MM HG: ICD-10-PCS | Mod: CPTII,S$GLB,, | Performed by: INTERNAL MEDICINE

## 2023-08-10 RX ORDER — AMLODIPINE BESYLATE 2.5 MG/1
2.5 TABLET ORAL DAILY
Qty: 90 TABLET | Refills: 1 | Status: SHIPPED | OUTPATIENT
Start: 2023-08-10 | End: 2024-02-12

## 2023-08-10 NOTE — PROGRESS NOTES
Subjective:    Patient ID:  Tino Fu is a 28 y.o. male patient here for evaluation Results      Follow up visit 8/10/23:  Patient came for follow up after test results.  Chest pain episodes reduced in frequency now occurring very sporadically.     History of Present Illness during initial visit:     28-year-old male with past medical history of hypertension, hyperlipidemia referred by primary care for evaluation of chest pain and first-degree AV block on EKG.  He had intermittent episodes of left upper chest pain, shoulder pain, arm pain for last several months.  Occurring approximately once a week, feels as dull sensation, with random onset and resolution, unrelated to exertion, with no associated symptoms and with no aggravating/relieving factors.  Pain sometimes occurred after eating food.  Sometimes lasts up to several hours and resolves on its own.  Last episode 1 week ago.  Denies any history of heart disease in the past.  Family history of cardiomegaly/congestive heart failure in parents.  He is scheduled for colonoscopy next month. No family history of sudden cardiac death.  Has history of hypertension however not taking hydrochlorothiazide for a month now.  History of hyperlipidemia and currently trying lifestyle modifications.  Denies palpitations, dizziness, syncope.         Review of patient's allergies indicates:  No Known Allergies    Past Medical History:   Diagnosis Date    Hypertension      History reviewed. No pertinent surgical history.  Social History     Tobacco Use    Smoking status: Never    Smokeless tobacco: Never   Substance Use Topics    Alcohol use: Yes     Comment: 4 times a year    Drug use: Not Currently        Review of Systems   Negative except as mentioned in HPI         Objective        Vitals:    08/10/23 1505   BP: (!) 149/87   Pulse: 89       LIPIDS - LAST 2   Lab Results   Component Value Date    CHOL 213 (H) 08/05/2023    CHOL 229 (H) 02/13/2023    HDL 52 08/05/2023  "   HDL 53 02/13/2023    LDLCALC 126.2 08/05/2023    LDLCALC 137.8 02/13/2023    TRIG 174 (H) 08/05/2023    TRIG 191 (H) 02/13/2023    CHOLHDL 24.4 08/05/2023    CHOLHDL 23.1 02/13/2023       CBC - LAST 2  Lab Results   Component Value Date    WBC 7.21 02/13/2023    WBC 6.66 02/01/2022    RBC 5.27 02/13/2023    RBC 5.05 02/01/2022    HGB 15.5 02/13/2023    HGB 15.1 02/01/2022    HCT 46.3 02/13/2023    HCT 45.6 02/01/2022    MCV 88 02/13/2023    MCV 90 02/01/2022    MCH 29.4 02/13/2023    MCH 29.9 02/01/2022    MCHC 33.5 02/13/2023    MCHC 33.1 02/01/2022    RDW 11.1 (L) 02/13/2023    RDW 11.4 (L) 02/01/2022     02/13/2023     02/01/2022    MPV 10.1 02/13/2023    MPV 10.5 02/01/2022    GRAN 3.6 02/13/2023    GRAN 49.5 02/13/2023    LYMPH 2.6 02/13/2023    LYMPH 36.6 02/13/2023    MONO 0.8 02/13/2023    MONO 11.5 02/13/2023    BASO 0.06 02/13/2023    BASO 0.03 02/01/2022    NRBC 0 02/13/2023    NRBC 0 02/01/2022       CHEMISTRY & LIVER FUNCTION - LAST 2  Lab Results   Component Value Date     02/13/2023     02/01/2022    K 4.4 02/13/2023    K 4.1 02/01/2022     02/13/2023     02/01/2022    CO2 25 02/13/2023    CO2 28 02/01/2022    ANIONGAP 12 02/13/2023    ANIONGAP 12 02/01/2022    BUN 12 02/13/2023    BUN 9 02/01/2022    CREATININE 0.9 02/13/2023    CREATININE 0.7 02/01/2022    GLU 86 02/13/2023    GLU 84 02/01/2022    CALCIUM 10.2 02/13/2023    CALCIUM 10.1 02/01/2022    ALBUMIN 4.6 05/18/2023    ALBUMIN 4.5 02/13/2023    PROT 7.8 02/13/2023    PROT 7.9 02/01/2022    ALKPHOS 51 (L) 02/13/2023    ALKPHOS 55 02/01/2022    ALT 53 (H) 02/13/2023    ALT 59 (H) 02/01/2022    AST 30 02/13/2023    AST 32 02/01/2022    BILITOT 0.9 02/13/2023    BILITOT 1.2 (H) 02/01/2022        CARDIAC PROFILE - LAST 2  No results found for: "BNP", "CPK", "CPKMB", "LDH", "TROPONINI", "TROPONINIHS"     COAGULATION - LAST 2  No results found for: "LABPT", "INR", "APTT"    ENDOCRINE & PSA - LAST 2  Lab " Results   Component Value Date    HGBA1C 5.4 02/13/2023    HGBA1C 5.3 02/13/2020    TSH 0.767 02/13/2023    TSH 0.757 02/13/2020        ECHOCARDIOGRAM RESULTS  No results found for this or any previous visit.      CURRENT/PREVIOUS VISIT EKG  Results for orders placed or performed in visit on 07/05/23   IN OFFICE EKG 12-LEAD (to [place])    Collection Time: 07/05/23  3:30 PM    Narrative    Test Reason : R07.9,    Vent. Rate : 076 BPM     Atrial Rate : 076 BPM     P-R Int : 208 ms          QRS Dur : 090 ms      QT Int : 354 ms       P-R-T Axes : 057 012 049 degrees     QTc Int : 398 ms    Normal sinus rhythm  Possible Left atrial enlargement  Borderline Abnormal ECG  No previous ECGs available  Confirmed by Charles Yang MD ([id number]) on 7/16/2023 1:39:35 PM    Referred By: PONCHO LUNA           Confirmed By:Charles Yang MD     No valid procedures specified.   No results found for this or any previous visit.    No valid procedures specified.          PREVIOUS STRESS TEST              PREVIOUS ANGIOGRAM        PHYSICAL EXAM    CONSTITUTIONAL: Well built, well nourished in no apparent distress  HEENT: No pallor  NECK: no JVD  LUNGS: CTA b/l  HEART: regular rate and rhythm, S1, S2 normal, no murmur   ABDOMEN: soft, non-tender; bowel sounds normal  EXTREMITIES: No edema  NEURO: AAO X 3   SKIN:  No rash  Psych:  Normal affect    I HAVE REVIEWED :    The vital signs, nurses notes, and all the pertinent radiology and labs.        Current Outpatient Medications   Medication Instructions    amLODIPine (NORVASC) 2.5 mg, Oral, Daily    bran/gum/fib/jono/psyl/kelp/pec (FIBER 6 ORAL) Oral    mometasone 0.1% (ELOCON) 0.1 % cream Topical (Top), Daily    multivitamin capsule Oral, Daily    omega-3 fatty acids/fish oil (FISH OIL-OMEGA-3 FATTY ACIDS) 300-1,000 mg capsule Oral, Daily        Assessment & Plan     08/10/2023:  Stress echo negative for ischemia.  Normal LV function on echo.  Chest pain episodes are possibly GI  related.  History of first-degree heart block on EKG- will monitor for now  Elevated blood pressure in the clinic today.  Repeat blood pressure 114/76.  He stopped taking hydrochlorothiazide for blood pressure.  His blood pressure is intermittently elevated.  He also had hypertensive response during the stress test.  will start a low-dose amlodipine 2.5 mg daily.  Advised him home BP monitoring.  He does not follow healthy diet at baseline.  Counseled patient regarding dietary modifications.  Referred to dietitian.  Advised weight loss  Cholesterol testing reviewed.  .  Borderline elevated ApoB, HS CRP, triglycerides.  Normal LpA.  Discussed with the patient regarding medical therapy of hyperlipidemia.  Patient would like to try lifestyle modifications at this point.  Advised to repeat lipid profile with the PCP after trying lifestyle modifications and if cholesterol is still elevated we will consider medical therapy with statin.       07/05/2023:  28-year-old male with past medical history of hypertension, hyperlipidemia referred by primary care for evaluation of chest pain and first-degree AV block on EKG.  He had intermittent episodes of left upper chest pain, shoulder pain, arm pain for last several months.  Occurring approximately once a week, feels as dull sensation, with random onset and resolution, unrelated to exertion, with no associated symptoms and with no aggravating/relieving factors.  Pain sometimes occurred after eating food.  Sometimes lasts up to several hours and resolves on its own.  Last episode 1 week ago.  Denies any history of heart disease in the past.  Family history of cardiomegaly/congestive heart failure in parents.  He is scheduled for colonoscopy next month. No family history of sudden cardiac death.  Has history of hypertension however not taking hydrochlorothiazide for a month now.  History of hyperlipidemia and currently trying lifestyle modifications.  Denies palpitations,  dizziness, syncope.     Chest pain-atypical: ?  GI related  EKG-sinus rhythm, first-degree AV block, no ischemic changes  Echocardiogram and stress echocardiogram for further evaluation  Advised patient to go to the ER in case of recurrent/worsening symptoms.    Hypertension- off hydrochlorothiazide currently.  Advised home BP monitoring and record the BP log    Dyslipidemia- following lifestyle modifications.  Repeat lipid profile, check direct LDL, HS CRP Apo A1 and B, Lpa for further evaluation      Follow up in about 6 months (around 2/10/2024).

## 2023-09-05 ENCOUNTER — PATIENT MESSAGE (OUTPATIENT)
Dept: ADMINISTRATIVE | Facility: HOSPITAL | Age: 28
End: 2023-09-05
Payer: COMMERCIAL

## 2023-09-18 ENCOUNTER — TELEPHONE (OUTPATIENT)
Dept: CARDIOLOGY | Facility: CLINIC | Age: 28
End: 2023-09-18
Payer: COMMERCIAL

## 2023-09-18 NOTE — TELEPHONE ENCOUNTER
----- Message from Jessika Soria NP sent at 8/8/2023  1:19 PM CDT -----  Your stress test is negative for reversible ischemia. The test is about 90% accurate. This means there are no blockages that are causing a problem, ( meaning over 70% blocked.) According to this test you are getting enough blood flow to the coronary arteries. If you are not having any symptoms we can push back your appointment, and save a copay. If you are having symptoms we will be happy to see you.

## 2023-10-20 ENCOUNTER — PATIENT MESSAGE (OUTPATIENT)
Dept: ADMINISTRATIVE | Facility: HOSPITAL | Age: 28
End: 2023-10-20
Payer: COMMERCIAL

## 2023-12-20 ENCOUNTER — PATIENT MESSAGE (OUTPATIENT)
Dept: ADMINISTRATIVE | Facility: HOSPITAL | Age: 28
End: 2023-12-20
Payer: COMMERCIAL

## 2024-01-01 ENCOUNTER — PATIENT MESSAGE (OUTPATIENT)
Dept: FAMILY MEDICINE | Facility: CLINIC | Age: 29
End: 2024-01-01
Payer: COMMERCIAL

## 2024-01-01 DIAGNOSIS — F41.9 ANXIETY: Primary | ICD-10-CM

## 2024-01-19 ENCOUNTER — TELEPHONE (OUTPATIENT)
Dept: PSYCHIATRY | Facility: CLINIC | Age: 29
End: 2024-01-19
Payer: COMMERCIAL

## 2024-01-19 NOTE — TELEPHONE ENCOUNTER
Called patient regarding the 2 referrals in the system for therapy and medication management. Advised patient of the wait list. Patient would like to be placed on the wait list. Offered patient resource list, patient accepted and it was sent via my chart.

## 2024-01-26 ENCOUNTER — TELEPHONE (OUTPATIENT)
Dept: PSYCHIATRY | Facility: CLINIC | Age: 29
End: 2024-01-26
Payer: COMMERCIAL

## 2024-01-30 ENCOUNTER — OFFICE VISIT (OUTPATIENT)
Dept: PSYCHIATRY | Facility: CLINIC | Age: 29
End: 2024-01-30
Payer: COMMERCIAL

## 2024-01-30 DIAGNOSIS — Z63.0 MARITAL CONFLICT: ICD-10-CM

## 2024-01-30 DIAGNOSIS — F41.9 ANXIETY: Primary | ICD-10-CM

## 2024-01-30 PROCEDURE — 90792 PSYCH DIAG EVAL W/MED SRVCS: CPT | Mod: 95,,, | Performed by: PSYCHOLOGIST

## 2024-01-30 RX ORDER — SERTRALINE HYDROCHLORIDE 25 MG/1
25 TABLET, FILM COATED ORAL DAILY
Qty: 30 TABLET | Refills: 1 | Status: SHIPPED | OUTPATIENT
Start: 2024-01-30 | End: 2024-04-17 | Stop reason: SDUPTHER

## 2024-01-30 SDOH — SOCIAL DETERMINANTS OF HEALTH (SDOH): PROBLEMS IN RELATIONSHIP WITH SPOUSE OR PARTNER: Z63.0

## 2024-01-30 NOTE — PROGRESS NOTES
The patient location is:  [place], [place]   The patient location [place] is: [place]  The patient phone number is: [phone]  Visit type: Virtual visit with synchronous audio and video  Each patient to whom he or she provides medical services by telemedicine is:  (1) informed of the relationship between the physician and patient and the respective role of any other health care provider with respect to management of the patient; and (2) notified that he or she may decline to receive medical services by telemedicine and may withdraw from such care at any time.     Outpatient Psychiatric Initial Visit  01/30/2024     ID:   Patient presents for an initial evaluation.      Reason for encounter: Referral from Dr. Monge     Chief Complaint: anxiety    History of Presenting Illness:  Pt is presenting to establish care. No history of psychiatry or individual therapy. Pt reported stress with a new marriage and a 2 y/o child. Stated that he wants to things to be clean and organized, which is difficult in a marriage with a baby. Noted that he spends a lot of time and energy cleaning, organizing, and putting things away. Pt stated that it results in irritability or being rude to partner. Time spent focused on organizing can causes procrasitnation or prioritizing the wrong things at work. Procrastination leads to worry about completing work-related tasks. Pt stated that his wife wants him to change this behavior but pt thinks of it as part of his identity that he does not want to change. Pt reported that his wife feels that he is not affectionate. Calls him selfish and stated that he does not care for her. Wife thinks that he does not initiate sex frequently enough. Pt stated that this is his first long-term relationship and they have been together for 10 years despite recently . Pt reported a history of escitalopram about 2 or 3 years ago managed by PCP. Stated that he experienced side effects (inorgasmia) and  "stopped taking it. Managed anxiety in college with cannabis and alprazolam "recreationally", per pt.     Depression symptoms: Pt denied     Anxiety symptoms: Nonproductive worry, difficulty relaxing, irritability, increased heart rate. Pt denied symptoms consistent with OCD, panic, phobias, or social anxiety.     Denice/Hypomania Symptoms: Pt denied current or history of related symptoms.     Psychosis Symptoms: Pt denied current or history of related symptoms.    Attention/Concentration Symptoms: Pt denied current or history of related symptoms.    Disordered Eating/Body Image Concerns: Pt denied current or history of related symptoms.    Suicidal Ideation and Risk: Pt denied current or history of related symptoms.    Homicidal/Violent Ideation and Risk: Pt denied current or history of related symptoms.    Criminal History: Pt denied.    Prior Psychiatric Treatment/Hospitalizations: Hx of marital therapy     Current psychiatric medication: Pt denied    Prior psychiatric medication trials: Lexapro (inorgasmia), alprazolam recreationally in college    Current Medical Conditions Per Chart Review: There is no problem list on file for this patient.     Family Psychiatric History: mother - learning disability    Alcohol Use: Pt reported minimal, infrequent alcohol use and denied a history of problematic drinking.    Tobacco and Drug Use: Pt denied tobacco or drug use.        Trauma history:  Pt denied     Mental Status Exam      Physical Exam  Constitutional:       Appearance: Normal appearance.   Neurological:      Mental Status: He is alert.   Psychiatric:         Attention and Perception: Attention and perception normal.         Mood and Affect: Affect normal. Mood is anxious.         Speech: Speech normal.         Behavior: Behavior normal. Behavior is cooperative.         Thought Content: Thought content normal.         Cognition and Memory: Cognition and memory normal.         Judgment: Judgment normal.      "     Current Evaluation:  Nutritional Screening:  Considering the patient's height and weight, medications, medical history and preferences, should a referral be made to the dietitian? No  Vitals: most recent vitals signs, dated greater than 90 days prior to this appointment, were reviewed  General: age appropriate, well nourished, casually dressed, neatly groomed  MSK: muscle strength/tone : no tremor or abnormal movements. Gait/Station: no ataxic, steady    Clinical Assessment : Pt is a 29 y/o male presenting with anxiety and marital stress. Pt's emphasis on orderliness, organization, and structure and rules at the expense of interpersonal relationships could suggest OCPD concerns. Moreover, pt readily acknowledges that he does not believe there is a problem with his thoughts/behaviors and is mostly attending this appointment to appease his wife. Discussed possible impact of these concerns in a relationship and encouraged pt to consider individual therapy in the future. Upon discussion, pt did request starting a low dose SSRI to help manage anxiety but prevent past sexual side effects. Will start low dose sertraline and monitor moving forward.     Summary     Diagnosis(es):   1) Anxiety Disorder  2) R/O Obsessive Compulsive Personality Disorder  3) Marital Conflict    Plan      Goal #1: Decrease anxiety    Pt is to start trial of sertraline 25 mg    Treatment plan and medication changes will be coordinated and consulted with PCP, Dr Monge on 1/29 for new medication. All general medical concerns will be managed by the PCP.     This author reviewed limits to confidentiality and this author's collaboration with pt's physician. Pt indicated understanding and denied any questions.    Return to Clinic: 1 month    -Call to report any worsening of symptoms or problems associated with medication  - Pt instructed to go to ER if thoughts of harming self or others arise     -Supportive therapy and psychoeducation  provided  -R/B/SE's of medications discussed with the pt who expresses understanding and chooses to take medications as prescribed.   -Pt instructed to call clinic, 911 or go to nearest emergency room if sxs worsen or pt is in   crisis. The pt expresses understanding.    Antidepressant/Antianxiety Medication Initiation:  Patient informed of risks, benefits, and potential side effects of medication and accepts informed consent.  Common side effects include nausea, fatigue, headache, insomnia., Specifically discussed the possibility of new or worsening suicidal thoughts/depression.  Patient instructed to stop the medication immediately and seek urgent treatment if this occurs. Patient instructed not to abruptly discontinue medication without physician guidance except in cases of sudden onset or worsening of SI.

## 2024-02-12 ENCOUNTER — OFFICE VISIT (OUTPATIENT)
Dept: FAMILY MEDICINE | Facility: CLINIC | Age: 29
End: 2024-02-12
Payer: COMMERCIAL

## 2024-02-12 VITALS
HEART RATE: 98 BPM | TEMPERATURE: 98 F | WEIGHT: 266.13 LBS | SYSTOLIC BLOOD PRESSURE: 126 MMHG | OXYGEN SATURATION: 98 % | HEIGHT: 73 IN | DIASTOLIC BLOOD PRESSURE: 80 MMHG | BODY MASS INDEX: 35.27 KG/M2

## 2024-02-12 DIAGNOSIS — Z00.00 ANNUAL PHYSICAL EXAM: Primary | ICD-10-CM

## 2024-02-12 PROCEDURE — 1159F MED LIST DOCD IN RCRD: CPT | Mod: CPTII,S$GLB,,

## 2024-02-12 PROCEDURE — 3079F DIAST BP 80-89 MM HG: CPT | Mod: CPTII,S$GLB,,

## 2024-02-12 PROCEDURE — 3008F BODY MASS INDEX DOCD: CPT | Mod: CPTII,S$GLB,,

## 2024-02-12 PROCEDURE — 3074F SYST BP LT 130 MM HG: CPT | Mod: CPTII,S$GLB,,

## 2024-02-12 PROCEDURE — 99999 PR PBB SHADOW E&M-EST. PATIENT-LVL IV: CPT | Mod: PBBFAC,,,

## 2024-02-12 PROCEDURE — 1160F RVW MEDS BY RX/DR IN RCRD: CPT | Mod: CPTII,S$GLB,,

## 2024-02-12 PROCEDURE — 99395 PREV VISIT EST AGE 18-39: CPT | Mod: S$GLB,,,

## 2024-02-12 NOTE — PROGRESS NOTES
Subjective:       Patient ID: Tino Fu is a 28 y.o. male.    Chief Complaint: annual visit      Tino Fu is a 28 y.o. male with history of HTN and anxiety who presents to clinic for annual visit. Doing well overall. Recently had a visit with psychiatry who prescribed him Zoloft. Pt reports he has not started this medication yet. Pt endorses some frustration over weight gain/ inability to lose weight. Reports lack of motivation for exercising due to busy lifestyle. Does report making healthier choices when it comes to meals. Had cholesterol workup with cardiology. History of HTN, previously prescribed Norvasc 2.5 mg. Notes he has not taken this medication regularly.         Review of Systems   Constitutional:  Negative for activity change, appetite change, fatigue and unexpected weight change.   Respiratory:  Negative for cough, chest tightness and shortness of breath.    Cardiovascular:  Negative for chest pain and palpitations.   Gastrointestinal:  Negative for abdominal pain, constipation, diarrhea and nausea.   Musculoskeletal:  Negative for arthralgias.   Neurological:  Negative for dizziness and headaches.         Past Medical History:   Diagnosis Date    Hypertension        Review of patient's allergies indicates:  No Known Allergies      Current Outpatient Medications:     bran/gum/fib/jono/psyl/kelp/pec (FIBER 6 ORAL), Take by mouth., Disp: , Rfl:     multivitamin capsule, Take by mouth once daily., Disp: , Rfl:     omega-3 fatty acids/fish oil (FISH OIL-OMEGA-3 FATTY ACIDS) 300-1,000 mg capsule, Take by mouth once daily., Disp: , Rfl:     sertraline (ZOLOFT) 25 MG tablet, Take 1 tablet (25 mg total) by mouth once daily., Disp: 30 tablet, Rfl: 1    Objective:        Physical Exam  Vitals reviewed.   Constitutional:       General: He is not in acute distress.     Appearance: Normal appearance. He is normal weight.   HENT:      Head: Normocephalic and atraumatic.   Eyes:       "Conjunctiva/sclera: Conjunctivae normal.   Cardiovascular:      Rate and Rhythm: Normal rate and regular rhythm.      Heart sounds: Normal heart sounds.   Pulmonary:      Effort: Pulmonary effort is normal.      Breath sounds: Normal breath sounds.   Musculoskeletal:         General: Normal range of motion.      Cervical back: Normal range of motion and neck supple.   Skin:     General: Skin is warm and dry.   Neurological:      Mental Status: He is oriented to person, place, and time.   Psychiatric:         Mood and Affect: Mood normal.         Behavior: Behavior normal.         Thought Content: Thought content normal.         Judgment: Judgment normal.           Visit Vitals  /80 (BP Location: Right arm, Patient Position: Sitting, BP Method: Large (Manual))   Pulse 98   Temp 97.7 °F (36.5 °C) (Oral)   Ht 6' 1" (1.854 m)   Wt 120.7 kg (266 lb 1.5 oz)   SpO2 98%   BMI 35.11 kg/m²      Assessment:         1. Annual physical exam    2. BMI 35.0-35.9,adult        Plan:         Diagnoses and all orders for this visit:    Annual physical exam  -     CBC Auto Differential; Future  -     COMPREHENSIVE METABOLIC PANEL; Future  -     Lipid Panel; Future  -     TSH; Future  -     HEMOGLOBIN A1C; Future    BMI 35.0-35.9,adult  -     Ambulatory referral/consult to Nutrition Services; Future  -     Discussed diet and exercise in detail. Discussed weight loss clinics, injectable weight loss therapy, as well as referral to bariatrics.  -     Pt would like to work on diet and exercise prior to taking these steps.        Follow up annually with PCP.         Family Medicine Physician Assistant     Future Appointments       Date Provider Specialty Appt Notes    2/16/2024  Lab labs    2/27/2024 Marco Burgess, PhD, MP Psychiatry Follow Up- 1 month             All of your core healthy metrics are met.       I spent a total of 25 minutes on the day of the visit.This includes face to face time and non-face to face time preparing to " see the patient (eg, review of tests), obtaining and/or reviewing separately obtained history, documenting clinical information in the electronic or other health record, independently interpreting results and communicating results to the patient/family/caregiver, or care coordinator.    We have addressed [3] Low: 2 or more self-limited or minor problems / 1 stable chronic illness / 1 acute, uncomplicated illness or injury  The complexity of the data reviewed and analyzed for this visit was [3] Limited (Reviewed prior external note, ordered unique testing or reviewed the results of each unique test)   The risk of complications and/or morbidity or mortality are [3] Low risk   The level of Medical Decision Making for this visit is [3] Low     Xerosis Aggressive Treatment: I recommended application of Cetaphil or CeraVe numerous times a day going to bed to all dry areas. I also prescribed a topical steroid for twice daily use.

## 2024-02-27 ENCOUNTER — OFFICE VISIT (OUTPATIENT)
Dept: PSYCHIATRY | Facility: CLINIC | Age: 29
End: 2024-02-27
Payer: COMMERCIAL

## 2024-02-27 DIAGNOSIS — F41.9 ANXIETY: Primary | ICD-10-CM

## 2024-02-27 DIAGNOSIS — Z63.0 MARITAL CONFLICT: ICD-10-CM

## 2024-02-27 PROCEDURE — 99213 OFFICE O/P EST LOW 20 MIN: CPT | Mod: 95,,, | Performed by: PSYCHOLOGIST

## 2024-02-27 SDOH — SOCIAL DETERMINANTS OF HEALTH (SDOH): PROBLEMS IN RELATIONSHIP WITH SPOUSE OR PARTNER: Z63.0

## 2024-02-27 NOTE — PROGRESS NOTES
The patient location is:  [place], [place]  The patient location [place] is: [place]  The patient phone number is: [phone]  Visit type: Virtual visit with synchronous audio and video  Each patient to whom he or she provides medical services by telemedicine is:  (1) informed of the relationship between the physician and patient and the respective role of any other health care provider with respect to management of the patient; and (2) notified that he or she may decline to receive medical services by telemedicine and may withdraw from such care at any time.     Outpatient Psychiatry Follow-Up Visit    Clinical Status of Patient: Outpatient (Ambulatory)  02/27/2024     Chief Complaint:  presenting today for a follow-up.       Interval History and Content of Current Session:  Interim Events/Subjective Report/Content of Current Session:  follow-up appointment.    Pt is a 27 y/o male with past psychiatric hx of anxiety who presents for follow-up treatment. Pt reported that he did not start the sertraline until a couple of days ago. No side effects. Continues to have stress at home with wife and work. Is exercising more frequently. Continues to work on finding a therapist.     Past Psychiatric hx: Lexapro (inorgasmia), alprazolam recreationally in college    Past Medical hx:   Past Medical History:   Diagnosis Date    Hypertension         Interim hx:  Medication changes last visit: Start trial of sertraline 25 mg  Anxiety: consistent with initial visit  Depression: pt denied     Denies suicidal/homicidal ideations.  Denies hopelessness/worthlessness.    Denies auditory/visual hallucinations      Alcohol: Infrequent use  Drug: Pt denies  Tobacco: Pt denies      Review of Systems   PSYCHIATRIC: Pertinent items are noted in the narrative.        CONSTITUTIONAL: weight stable    Past Medical, Family and Social History: The patient's past medical, family and social history have been reviewed and updated as appropriate  within the electronic medical record. See encounter notes.     Current Psychiatric Medication:  sertraline 25 mg     Compliance: yes      Side effects: Pt denies     Risk Parameters:  Patient reports no suicidal ideation  Patient reports no homicidal ideation  Patient reports no self-injurious behavior  Patient reports no violent behavior     Exam (detailed: at least 9 elements; comprehensive: all 15 elements)   Constitutional  Vitals:  Most recent vital signs, dated less than 90 days prior to this appointment, were reviewed. BP: ()/()   Arterial Line BP: ()/()       General:  unremarkable, age appropriate, casual attire, good eye contact, good rapport       Musculoskeletal  Muscle Strength/Tone:  no flaccidity, no tremor    Gait & Station:  normal      Psychiatric                       Speech:  normal tone, normal rate, rhythm, and volume   Mood & Affect:   anxious         Thought Process:   Goal directed; Linear    Associations:   intact   Thought Content:   No SI/HI, delusions, or paranoia, no AV/VH   Insight & Judgement:   Good, adequate to circumstances   Orientation:   grossly intact; alert and oriented x 4    Memory:  intact for content of interview    Language:  grossly intact, can repeat    Attention Span  : Grossly intact for content of interview   Fund of Knowledge:   intact and appropriate to age and level of education        Assessment and Diagnosis   Status/Progress: Based on the examination today, the patient's problem(s) is/are adequately controlled.  New problems have not been presented today. Co-morbidities are not complicating management of the primary condition. There are no active rule-out diagnoses for this patient at this time.      Impression: Pt continues to experience stress consistent with intake session. Only recently started the sertraline, making it difficult to assess effectiveness of treatment plan. We discussed strategies to find and get engaged in therapy. Will continue with  medication plan as is for now and follow-up in 4-6 weeks.     Diagnosis:   1) Anxiety Disorder  2) R/O Obsessive Compulsive Personality Disorder  3) Marital Conflict  Intervention/Counseling/Treatment Plan   Medication Management:      1. Sertraline 25 mg     2. Call to report any worsening of symptoms or problems with the medication. Pt instructed to go to ER with thoughts of harming self, others     3. Patient given contact # for psychotherapists at [place] and/or [facility] and also instructed to check with insurance for list of providers.     Psychotherapy: none  Target symptoms:   Why chosen therapy is appropriate versus another modality: CBT used; relevant to diagnosis, patient responds to this modality  Outcome monitoring methods: self-report, observation  Therapeutic intervention type: Cognitive Behavioral Therapy  Topics discussed/themes: building skills sets for symptom management, symptom recognition, nutrition, exercise  The patient's response to the intervention is   Patient's response to treatment is: good.   The patient's progress toward treatment goals: improving     Return to clinic: 4-6 weeks    -Cognitive-Behavioral/Supportive therapy and psychoeducation provided  -R/B/SE's of medications discussed with the pt who expresses understanding and chooses to take medications as prescribed.   -Pt instructed to call clinic, 911 or go to nearest emergency room if sxs worsen or pt is in   crisis. The pt expresses understanding.    Marco Burgess, PhD, MP     Antidepressant/Antianxiety Medication Initiation:  Patient informed of risks, benefits, and potential side effects of medication and accepts informed consent.  Common side effects include nausea, fatigue, headache, insomnia., Specifically discussed the possibility of new or worsening suicidal thoughts/depression.  Patient instructed to stop the medication immediately and seek urgent treatment if this occurs. Patient instructed not to abruptly  discontinue medication without physician guidance except in cases of sudden onset or worsening of SI.       Stimulant Medication Initiation:  Patient advised of risks, benefits, and side effects of medication and accepts informed consent.  Common side effects include insomnia, irritability, jittery feeling, dry mouth, and agitation/hostility., Patient advised of potential addictive nature of medication and controlled substance classification.  Instructed to safeguard medication as no early refills can be given for lost or stolen medications.       Benzodiazepine Initiation:  Patient advised of the risks, benefits, and common side effects of medication and has accepted informed consent.  Common side effects include drowsiness, impaired coordination, possible memory loss., Patient advised NOT to operate a vehicle or machinery untiil they are sure how the medication will affec them.  Client also advised of danger of mixing this medication with alcohol., Patient advised of potential addictive nature of medication and need to safeguard medication as no early refills for lost or stolen medications can be authorized.       Pregnancy Warning:  Patient denies current pregnancy possibility.  Patient made aware that medications have not been proven safe in pregnancy and that she must maintain adequate birth control.  Patient instructed to alert us immediately if she becomes pregnant.       Sleep Aid Initiation:  Patient advised of potential side effects of medication including sleep walking or other complex behaviors.  Patient advised not to mix medication with alcohol, to go to bed immediately after taking, and to stop at first sign of any unusual behaviors.      Antipsychotic Initiation: Typical WILLIAMS's reviewed including weight gain, abnormal movements, EPS, TD, metabolic side effects.     Mood Stabilizer Medication Initiation: Patient informed of risks, benefits, and potential side effects of medication and accepts informed  consent. Common side effects include: Dizziness, drowsiness, tiredness, nausea/vomiting, stomach/abdominal pain, headache, trouble sleeping, or constipation may occur. If any of these effects last or get worse, tell your doctor or pharmacist promptly.  Specifically discussed the possibility of new or worsening suicidal thoughts/depression.  Patient instructed to stop the medication immediately and seek urgent treatment if this occurs. Patient instructed not to abruptly discontinue medication without physician guidance except in cases of sudden onset or worsening of SI.

## 2024-02-28 ENCOUNTER — TELEPHONE (OUTPATIENT)
Dept: PSYCHIATRY | Facility: CLINIC | Age: 29
End: 2024-02-28
Payer: COMMERCIAL

## 2024-02-28 NOTE — TELEPHONE ENCOUNTER
----- Message from Marco Burgess, PhD, MP sent at 2/27/2024  5:08 PM CST -----  Regarding: apt  Please contact for a f/u virtual visit in 4-6 weeks. Can be on Tuesday or [place] days. JULIO CÉSAR

## 2024-04-16 NOTE — PROGRESS NOTES
The patient location is:  [place], [place]  The patient location [place] is: [place]  The patient phone number is: [phone]  Visit type: Virtual visit with synchronous audio and video  Each patient to whom he or she provides medical services by telemedicine is:  (1) informed of the relationship between the physician and patient and the respective role of any other health care provider with respect to management of the patient; and (2) notified that he or she may decline to receive medical services by telemedicine and may withdraw from such care at any time.     Outpatient Psychiatry Follow-Up Visit    Clinical Status of Patient: Outpatient (Ambulatory)  04/16/2024     Chief Complaint:  presenting today for a follow-up.       Interval History and Content of Current Session:  Interim Events/Subjective Report/Content of Current Session:  follow-up appointment.    Pt is a 27 y/o male with past psychiatric hx of anxiety who presents for follow-up treatment. Pt reported not noticing much difference with the sertraline. Noted some delay in orgasm but stated that he is not concerned. Has reached out to [facility] a few days ago and plans to start therapy soon.     Past Psychiatric hx: Lexapro (inorgasmia), alprazolam recreationally in college    Past Medical hx:   Past Medical History:   Diagnosis Date    Hypertension         Interim hx:  Medication changes last visit: Start trial of sertraline 25 mg  Anxiety: consistent with initial visit  Depression: pt denied     Denies suicidal/homicidal ideations.  Denies hopelessness/worthlessness.    Denies auditory/visual hallucinations      Alcohol: Infrequent use  Drug: Pt denies  Tobacco: Pt denies      Review of Systems   PSYCHIATRIC: Pertinent items are noted in the narrative.        CONSTITUTIONAL: weight stable    Past Medical, Family and Social History: The patient's past medical, family and social history have been reviewed and updated as appropriate within the  electronic medical record. See encounter notes.     Current Psychiatric Medication:  sertraline 25 mg     Compliance: yes      Side effects: Pt denies     Risk Parameters:  Patient reports no suicidal ideation  Patient reports no homicidal ideation  Patient reports no self-injurious behavior  Patient reports no violent behavior     Exam (detailed: at least 9 elements; comprehensive: all 15 elements)   Constitutional  Vitals:  Most recent vital signs, dated less than 90 days prior to this appointment, were reviewed. BP: ()/()   Arterial Line BP: ()/()       General:  unremarkable, age appropriate, casual attire, good eye contact, good rapport       Musculoskeletal  Muscle Strength/Tone:  no flaccidity, no tremor    Gait & Station:  normal      Psychiatric                       Speech:  normal tone, normal rate, rhythm, and volume   Mood & Affect:   anxious         Thought Process:   Goal directed; Linear    Associations:   intact   Thought Content:   No SI/HI, delusions, or paranoia, no AV/VH   Insight & Judgement:   Good, adequate to circumstances   Orientation:   grossly intact; alert and oriented x 4    Memory:  intact for content of interview    Language:  grossly intact, can repeat    Attention Span  : Grossly intact for content of interview   Fund of Knowledge:   intact and appropriate to age and level of education        Assessment and Diagnosis   Status/Progress: Based on the examination today, the patient's problem(s) is/are adequately controlled.  New problems have not been presented today. Co-morbidities are not complicating management of the primary condition. There are no active rule-out diagnoses for this patient at this time.      Impression: Pt continues to experience stress consistent with intake session. Will increase dose of sertraline and encouraged pt to engage in therapy.     Diagnosis:   1) Anxiety Disorder  2) R/O Obsessive Compulsive Personality Disorder  3) Marital  Conflict  Intervention/Counseling/Treatment Plan   Medication Management:      1. Increase sertraline 50 mg     2. Call to report any worsening of symptoms or problems with the medication. Pt instructed to go to ER with thoughts of harming self, others     3. Patient given contact # for psychotherapists at [place] and/or [facility] and also instructed to check with insurance for list of providers.     Psychotherapy: none  Target symptoms:   Why chosen therapy is appropriate versus another modality: CBT used; relevant to diagnosis, patient responds to this modality  Outcome monitoring methods: self-report, observation  Therapeutic intervention type: Cognitive Behavioral Therapy  Topics discussed/themes: building skills sets for symptom management, symptom recognition, nutrition, exercise  The patient's response to the intervention is   Patient's response to treatment is: good.   The patient's progress toward treatment goals: improving     Return to clinic: 4-6 weeks    -Cognitive-Behavioral/Supportive therapy and psychoeducation provided  -R/B/SE's of medications discussed with the pt who expresses understanding and chooses to take medications as prescribed.   -Pt instructed to call clinic, 911 or go to nearest emergency room if sxs worsen or pt is in   crisis. The pt expresses understanding.    Marco Burgess, PhD, MP     Antidepressant/Antianxiety Medication Initiation:  Patient informed of risks, benefits, and potential side effects of medication and accepts informed consent.  Common side effects include nausea, fatigue, headache, insomnia., Specifically discussed the possibility of new or worsening suicidal thoughts/depression.  Patient instructed to stop the medication immediately and seek urgent treatment if this occurs. Patient instructed not to abruptly discontinue medication without physician guidance except in cases of sudden onset or worsening of SI.

## 2024-04-17 ENCOUNTER — OFFICE VISIT (OUTPATIENT)
Dept: PSYCHIATRY | Facility: CLINIC | Age: 29
End: 2024-04-17
Payer: COMMERCIAL

## 2024-04-17 DIAGNOSIS — Z63.0 MARITAL CONFLICT: ICD-10-CM

## 2024-04-17 DIAGNOSIS — F41.9 ANXIETY: Primary | ICD-10-CM

## 2024-04-17 PROCEDURE — 99214 OFFICE O/P EST MOD 30 MIN: CPT | Mod: 95,,, | Performed by: PSYCHOLOGIST

## 2024-04-17 PROCEDURE — 1159F MED LIST DOCD IN RCRD: CPT | Mod: CPTII,95,, | Performed by: PSYCHOLOGIST

## 2024-04-17 RX ORDER — SERTRALINE HYDROCHLORIDE 50 MG/1
50 TABLET, FILM COATED ORAL DAILY
Qty: 30 TABLET | Refills: 1 | Status: SHIPPED | OUTPATIENT
Start: 2024-04-17 | End: 2025-04-17

## 2024-04-17 SDOH — SOCIAL DETERMINANTS OF HEALTH (SDOH): PROBLEMS IN RELATIONSHIP WITH SPOUSE OR PARTNER: Z63.0

## 2024-04-18 ENCOUNTER — TELEPHONE (OUTPATIENT)
Dept: PSYCHIATRY | Facility: CLINIC | Age: 29
End: 2024-04-18
Payer: COMMERCIAL

## 2024-04-18 NOTE — TELEPHONE ENCOUNTER
----- Message from Marco Burgess, PhD, MP sent at 4/17/2024  5:49 PM CDT -----  Regarding: apt  Please contact with a f/u virtual visit in 1 month. JULIO CÉSAR

## 2024-04-22 NOTE — TELEPHONE ENCOUNTER
Called patient and no answer left message to have patient call office so can schedule  virtual 1 moth follow up

## 2024-05-01 ENCOUNTER — PATIENT MESSAGE (OUTPATIENT)
Dept: PSYCHIATRY | Facility: CLINIC | Age: 29
End: 2024-05-01
Payer: COMMERCIAL

## 2024-05-01 NOTE — TELEPHONE ENCOUNTER
Called and left message for patient to call office to get 1 month follow up scheduled  Also sent my chart message asking the same

## 2024-06-10 ENCOUNTER — TELEPHONE (OUTPATIENT)
Dept: PSYCHIATRY | Facility: CLINIC | Age: 29
End: 2024-06-10
Payer: COMMERCIAL

## 2024-06-10 NOTE — TELEPHONE ENCOUNTER
Spoke to patient regarding the therapy referral on the wait list. Scheduled a new patient in person appointment with Coleman Maravilla LPC for 06/14/2024 @ 2pm. Advised patient of the location, contact phone number, to arrive 15 minutes early for the appointment, to bring ID, insurance card, informed of security presence and mandatory electronic search, and of the cancellation policy. Patient states understanding and no additional questions at this time.

## 2024-06-14 ENCOUNTER — CLINICAL SUPPORT (OUTPATIENT)
Dept: PSYCHIATRY | Facility: CLINIC | Age: 29
End: 2024-06-14
Payer: COMMERCIAL

## 2024-06-14 DIAGNOSIS — F41.9 ANXIETY: Primary | Chronic | ICD-10-CM

## 2024-06-14 PROCEDURE — 90791 PSYCH DIAGNOSTIC EVALUATION: CPT | Mod: S$GLB,,, | Performed by: COUNSELOR

## 2024-06-14 PROCEDURE — 99999 PR PBB SHADOW E&M-EST. PATIENT-LVL II: CPT | Mod: PBBFAC,,, | Performed by: COUNSELOR

## 2024-06-14 NOTE — PROGRESS NOTES
Initial Assessment LPC  6/14/24    Site/Location:  [facility]    Visit Type: 60 min outpt Initial Assessment     Participants: Ct and this clinician.    Therapeutic Intervention: Met with patient Outpatient - Initial Assessment  60 min -  [id number]    Chief complaint/reason for encounter: Depression / Anxiety    Interval history and content of current session: Ct is a 29-year-old male who was present today for an initial assessment to begin psychotherapy.  Client reported that he has anxiety.  He reported having negative body sensations by sweating when he experiences anxiety.  Client reported that he has relationship issues with his wife and feels that he has resentment towards her.  He reported that he has compulsive behaviors like cleaning areas in the home instead of doing more responsible tasks.  Client denied any manic behaviors, psychosis, prior hospitalizations, or suicide attempts.  He denied any current substance abuse issues.  He reported that he began smoking marijuana daily at age 12 until age 23, then again in 2021 when he got a marijuana card.  He reported that he withheld the information from his wife for months about the marijuana card and then stopped smoking marijuana again.  He reported his childhood was okay.  He reported that his mother has cognitive issues that were never diagnosed.  Reported that they  when he was age 12.  He reported that his father would shoplift in stores while he was present.  Client reported that he feels he also has an issue with morality, stating if it is beneficial to me then I will do it.  Today client denied any current or recent SI/HI.      Treatment plan:  Target symptoms: depression/anxiety  Why chosen therapy is appropriate versus another modality: Relevant to diagnosis  Outcome monitoring methods: self-report. CSSRS. PHQ-9  Therapeutic intervention type: supportive psychotherapy. Motivational interviewing.     Risk parameters:  Patient  reports no suicidal ideation  Patient reports no homicidal ideation  Patient reports no self-injurious behavior  Patient reports no violent behavior    Verbal deficits: None    Patient's response to intervention:  The patient's response to intervention is accepting.    Progress toward goals and other mental status changes:  The patient's progress toward goals is good.    Diagnosis:   Anxiety  R/O OCPD    Plan:  Individual psychotherapy weekly. Utilize IFS therapy and memory reconsolidation to raise emotional tolerance and decrease negative symptoms. Ct acknowledged plan and is agreement with the plan.    Return to clinic: 1 week    Length of Service (minutes): 60       Each patient to whom he or she provides medical services by telemedicine is: (1) informed of the relationship between the physician and patient and the respective role of any other health care provider with respect to management of the patient; and (2) notified that he or she may decline to receive medical services by telemedicine and may withdraw from such care at any time.

## 2024-06-21 ENCOUNTER — CLINICAL SUPPORT (OUTPATIENT)
Dept: PSYCHIATRY | Facility: CLINIC | Age: 29
End: 2024-06-21
Payer: COMMERCIAL

## 2024-06-21 DIAGNOSIS — Z63.0 MARITAL CONFLICT: ICD-10-CM

## 2024-06-21 DIAGNOSIS — F41.9 ANXIETY: ICD-10-CM

## 2024-06-21 DIAGNOSIS — F60.5 OBSESSIVE COMPULSIVE PERSONALITY DISORDER: Primary | ICD-10-CM

## 2024-06-21 PROCEDURE — 99999 PR PBB SHADOW E&M-EST. PATIENT-LVL I: CPT | Mod: PBBFAC,,, | Performed by: COUNSELOR

## 2024-06-21 SDOH — SOCIAL DETERMINANTS OF HEALTH (SDOH): PROBLEMS IN RELATIONSHIP WITH SPOUSE OR PARTNER: Z63.0

## 2024-06-21 NOTE — PROGRESS NOTES
Individual Psychotherapy [facility]  6/21/24    Site/Location:  [facility]    Visit Type: 45 min outpt individual psychotherapy    Participants: Ct and this clinician.    Therapeutic Intervention: Met with patient Outpatient - Interactive psychotherapy 45 min - CPT code 40421    Chief complaint/reason for encounter: Depression / Anxiety    Interval history and content of current session: Ct reported no significant change in mood since previous session.  Client was introduced to and participated in calm breathing, focused attention, and grounding.  Discussed information from previous session and discussed more in depth about client's symptoms and behaviors related to his obsessive compulsive behaviors.  Client reported that he keeps busy with tasks in organizations throughout the day while at work in the home.  He reported that any downtime is typically spent on his cell phone searching for answers for questions or spending time on social media such as [employer].  It was determined that client spends a significant amount of time on his cell phone and with other tasks.  Today client denied any current or recent SI/HI.    Treatment plan:  Target symptoms: depression/anxiety  Why chosen therapy is appropriate versus another modality: Relevant to diagnosis  Outcome monitoring methods: self-report. CSSRS.   Therapeutic intervention type: supportive psychotherapy. IFS therapy, memory reconsolidation.     Risk parameters:  Patient reports no suicidal ideation  Patient reports no homicidal ideation  Patient reports no self-injurious behavior  Patient reports no violent behavior    Verbal deficits: None    Patient's response to intervention:  The patient's response to intervention is accepting.    Progress toward goals and other mental status changes:  The patient's progress toward goals is good.    Diagnosis:   OCPD  Anxiety  Marital conflict    Plan:  Individual psychotherapy weekly. Utilize IFS therapy and memory  reconsolidation to raise emotional tolerance and decrease negative symptoms. Ct acknowledged plan and is agreement with the plan.    Return to clinic: 1 week    Length of Service (minutes): 45       Each patient to whom he or she provides medical services by telemedicine is: (1) informed of the relationship between the physician and patient and the respective role of any other health care provider with respect to management of the patient; and (2) notified that he or she may decline to receive medical services by telemedicine and may withdraw from such care at any time.

## 2024-06-28 ENCOUNTER — CLINICAL SUPPORT (OUTPATIENT)
Dept: PSYCHIATRY | Facility: CLINIC | Age: 29
End: 2024-06-28
Payer: COMMERCIAL

## 2024-06-28 DIAGNOSIS — F41.9 ANXIETY: ICD-10-CM

## 2024-06-28 DIAGNOSIS — F60.5 OBSESSIVE COMPULSIVE PERSONALITY DISORDER: Primary | ICD-10-CM

## 2024-06-28 DIAGNOSIS — Z63.0 MARITAL CONFLICT: ICD-10-CM

## 2024-06-28 PROCEDURE — 90834 PSYTX W PT 45 MINUTES: CPT | Mod: S$GLB,,, | Performed by: COUNSELOR

## 2024-06-28 PROCEDURE — 99999 PR PBB SHADOW E&M-EST. PATIENT-LVL I: CPT | Mod: PBBFAC,,, | Performed by: COUNSELOR

## 2024-06-28 SDOH — SOCIAL DETERMINANTS OF HEALTH (SDOH): PROBLEMS IN RELATIONSHIP WITH SPOUSE OR PARTNER: Z63.0

## 2024-06-28 NOTE — PROGRESS NOTES
Individual Psychotherapy [facility]  6/28/24    Site/Location:  [facility]    Visit Type: 45 min outpt individual psychotherapy    Participants: Ct and this clinician.    Therapeutic Intervention: Met with patient Outpatient - Interactive psychotherapy 45 min - CPT code 48406    Chief complaint/reason for encounter: Depression / Anxiety    Interval history and content of current session: Ct reported no significant change in mood since the previous session.  Client clinician discussed mindfulness definitions, skills, and practice.  Reviewed intentionally living with awareness in the present moment, without judging rejecting the moment, without attachment to the moment.  Reviewed mindfulness and mindfulness skills including meditation, contemplative prayer, mindfulness movement.  Client was in full participation.  Today client participated in calm breathing, focused attention, and grounding.  He denied any current or recent SI/HI.    Treatment plan:  Target symptoms: depression/anxiety  Why chosen therapy is appropriate versus another modality: Relevant to diagnosis  Outcome monitoring methods: self-report. CSSRS.   Therapeutic intervention type: supportive psychotherapy. IFS therapy, memory reconsolidation.     Risk parameters:  Patient reports no suicidal ideation  Patient reports no homicidal ideation  Patient reports no self-injurious behavior  Patient reports no violent behavior    Verbal deficits: None    Patient's response to intervention:  The patient's response to intervention is accepting.    Progress toward goals and other mental status changes:  The patient's progress toward goals is good.    Diagnosis:   Obsessive compulsive personality disorder  Anxiety  Marital conflict    Plan:  Individual psychotherapy weekly. Utilize IFS therapy and memory reconsolidation to raise emotional tolerance and decrease negative symptoms. Ct acknowledged plan and is agreement with the plan.    Return to clinic: 1  week    Length of Service (minutes): 45       Each patient to whom he or she provides medical services by telemedicine is: (1) informed of the relationship between the physician and patient and the respective role of any other health care provider with respect to management of the patient; and (2) notified that he or she may decline to receive medical services by telemedicine and may withdraw from such care at any time.

## 2024-07-05 RX ORDER — SERTRALINE HYDROCHLORIDE 50 MG/1
50 TABLET, FILM COATED ORAL DAILY
Qty: 30 TABLET | Refills: 1 | Status: SHIPPED | OUTPATIENT
Start: 2024-07-05 | End: 2025-07-05

## 2024-07-05 RX ORDER — SERTRALINE HYDROCHLORIDE 50 MG/1
50 TABLET, FILM COATED ORAL DAILY
Qty: 30 TABLET | Refills: 1 | OUTPATIENT
Start: 2024-07-05 | End: 2025-07-05

## 2024-07-16 ENCOUNTER — OFFICE VISIT (OUTPATIENT)
Dept: OTOLARYNGOLOGY | Facility: CLINIC | Age: 29
End: 2024-07-16
Payer: COMMERCIAL

## 2024-07-16 VITALS — WEIGHT: 256.19 LBS | HEIGHT: 73 IN | BODY MASS INDEX: 33.95 KG/M2

## 2024-07-16 DIAGNOSIS — R04.0 EPISTAXIS: ICD-10-CM

## 2024-07-16 DIAGNOSIS — J03.91 RECURRENT TONSILLITIS: Primary | ICD-10-CM

## 2024-07-16 PROCEDURE — 99204 OFFICE O/P NEW MOD 45 MIN: CPT | Mod: S$GLB,,, | Performed by: OTOLARYNGOLOGY

## 2024-07-16 PROCEDURE — 1160F RVW MEDS BY RX/DR IN RCRD: CPT | Mod: CPTII,S$GLB,, | Performed by: OTOLARYNGOLOGY

## 2024-07-16 PROCEDURE — 1159F MED LIST DOCD IN RCRD: CPT | Mod: CPTII,S$GLB,, | Performed by: OTOLARYNGOLOGY

## 2024-07-16 PROCEDURE — 3008F BODY MASS INDEX DOCD: CPT | Mod: CPTII,S$GLB,, | Performed by: OTOLARYNGOLOGY

## 2024-07-16 PROCEDURE — 99999 PR PBB SHADOW E&M-EST. PATIENT-LVL III: CPT | Mod: PBBFAC,,, | Performed by: OTOLARYNGOLOGY

## 2024-07-16 NOTE — PROGRESS NOTES
Subjective:       Patient ID: Tino Fu is a 29 y.o. male.    Chief Complaint: Snoring and Sore Throat    Tino is here for recurrent strep and snoring.  He reports 1-2 episodes of strep when he was a kid for a few years.  He reports 5 episodes of confirmed strep in the past year and 2 additional episodes of exudative pharyngitis.   Symptoms include fever, aches, chills and at least mild sore throat.   Currently on abx.  He does snore on occasion. No known concerns for apneas. Sleep quality is good overall though reduced lately Body mass index is 33.8 kg/m².     Also with epistaxis intermittent.   Pertinent medical issues: OCD    Patient validated questionnaires (if applicable):      %            No data to display                   No data to display                   No data to display                     Social History     Tobacco Use   Smoking Status Never   Smokeless Tobacco Never     Social History     Substance and Sexual Activity   Alcohol Use Yes    Comment: 4 times a year          Objective:        Constitutional:   He is oriented to person, place, and time. He appears well-developed and well-nourished. He appears alert. He is active. Normal speech.      Head:  Normocephalic and atraumatic. Head is without TMJ tenderness. No scars. Salivary glands normal.  Facial strength is normal.      Ears:    Right Ear: No drainage or swelling. No middle ear effusion.   Left Ear: No drainage or swelling.  No middle ear effusion.     Nose:  No mucosal edema, rhinorrhea or sinus tenderness. No turbinate hypertrophy.      Mouth/Throat  Oropharynx clear and moist without lesions or asymmetry, normal uvula midline and mirror exam normal. Normal dentition. No uvula swelling, lacerations or trismus. No oropharyngeal exudate. Tonsils present (recessed, cryptic, very inflamed appearing. no active exudate), +2.  Tonsillar erythema, tonsillar exudate.      Neck:  Full range of motion with neck supple and no adenopathy.  Thyroid tenderness is present. No tracheal deviation, no edema, no erythema, normal range of motion, no stridor, no crepitus and no neck rigidity present. No thyroid mass present.     Cardiovascular:    Intact distal pulses and normal pulses.              Pulmonary/Chest:   Effort normal and breath sounds normal. No stridor.     Psychiatric:   His speech is normal and behavior is normal. His mood appears not anxious. His affect is not labile.     Neurological:   He is alert and oriented to person, place, and time. No sensory deficit.     Skin:   No abrasions, lacerations, lesions, or rashes. No abrasion and no bruising noted.         Tests / Results:  none    Assessment:       1. Recurrent tonsillitis    2. Epistaxis          Plan:         We discussed recurrent pharyngitis and what appears to be chronic tonsillitis. Can consider Tonsillectomy +/- Adenoidectomy.  I discussed the risks of tonsillectomy/adenoidectomy, including bleeding, recurrence/persistence of issues (regrowth), need for further procedures, taste changes, injury to mouth/lips, tongue numbness, speech/swallowing changes, VPI.  He will look into it.     We discussed epistaxis general management.   Nasal moisture for now. Can consider nasal cautery.

## 2024-07-16 NOTE — PATIENT INSTRUCTIONS
Nasal Moisture Therapy:    A dry nose can cause crusting, pain, bleeding, nasal congestion, and intermittent drainage. It is important to keep the nose moisturized. This is the best way to reduce the risk of bleeding, as it prevents the blood vessels from coming to the surface and opening up.     Nasal emollients (moisturizers) are most important. There are multiple over the counter or natural products available, including many that are around the house. Ones that I like are:  Coconut oil, Aquaphor, Ponaris nasal ointment, Vaseline or Mupirocin bacterial ointment if there is also an infection associated with it. For any of these:  Deposit a pea or dime sized amount into the entrance to the nasal cavity with a q-tip or pinky finger. Apply to the septum (portion that divides the left and right side) as this is the area where crusting and bleeding develops more commonly  Perform this at least 2 times daily, including before bed. This allows the ointment to melt along the nasal cavity when you lay down.  Nasal saline spray or irrigations can help wash away mucous and crusting and keep the nose healthy. Perform BEFORE applying the nasal emollient.   Use a humidifier in the bedroom  If you use CPAP, make sure it has humidification on it.  Be aware that medical nasal sprays can occasionally dry the nose out, so keep the nose moist while using these medications.

## 2024-07-19 ENCOUNTER — CLINICAL SUPPORT (OUTPATIENT)
Dept: PSYCHIATRY | Facility: CLINIC | Age: 29
End: 2024-07-19
Payer: COMMERCIAL

## 2024-07-19 DIAGNOSIS — F41.9 ANXIETY: ICD-10-CM

## 2024-07-19 DIAGNOSIS — F60.5 OBSESSIVE COMPULSIVE PERSONALITY DISORDER: Primary | ICD-10-CM

## 2024-07-19 PROCEDURE — 99999 PR PBB SHADOW E&M-EST. PATIENT-LVL I: CPT | Mod: PBBFAC,,, | Performed by: COUNSELOR

## 2024-07-19 PROCEDURE — 90834 PSYTX W PT 45 MINUTES: CPT | Mod: S$GLB,,, | Performed by: COUNSELOR

## 2024-07-19 NOTE — PROGRESS NOTES
"/Individual Psychotherapy [facility]  7/19/24    Site/Location:  [facility]    Visit Type: 45 min outpt individual psychotherapy    Participants: Ct and this clinician.    Therapeutic Intervention: Met with patient Outpatient - Interactive psychotherapy 45 min - CPT code 05228    Chief complaint/reason for encounter: Depression / Anxiety    Interval history and content of current session:   Client reported no significant change in mood since the previous session.  Discussed information from previous session.  Client reported that he has been trying to use more mindfulness skills like using consequences and breathing.  He reported that his wife continues to question his progress in his behaviors towards her.  He reported that his "love language" with his wife is "off".   Client identified that at work he has better behaviors and considers his communication with others and prioritizes people who have higher status than him.  Client reported at home he continues behaviors like distracting with his phone, the Internet, video tresa and other things.  Discussed being more aware of unhealthy habits at home in journaling how much time he was spending distracting.  Client participated in calm breathing, focus attention, and grounding.  He denied any current or recent SI/ HI.    Treatment plan:  Target symptoms: depression/anxiety  Why chosen therapy is appropriate versus another modality: Relevant to diagnosis  Outcome monitoring methods: self-report. CSSRS.   Therapeutic intervention type: supportive psychotherapy. IFS therapy, memory reconsolidation.     Risk parameters:  Patient reports no suicidal ideation  Patient reports no homicidal ideation  Patient reports no self-injurious behavior  Patient reports no violent behavior    Verbal deficits: None    Patient's response to intervention:  The patient's response to intervention is accepting.    Progress toward goals and other mental status changes:  The patient's " progress toward goals is good.    Diagnosis:   Obsessive compulsive personality disorder  Anxiety    Plan:  Individual psychotherapy weekly. Utilize IFS therapy and memory reconsolidation to raise emotional tolerance and decrease negative symptoms. Ct acknowledged plan and is agreement with the plan.    Return to clinic: 1 week    Length of Service (minutes): 45       Each patient to whom he or she provides medical services by telemedicine is: (1) informed of the relationship between the physician and patient and the respective role of any other health care provider with respect to management of the patient; and (2) notified that he or she may decline to receive medical services by telemedicine and may withdraw from such care at any time.

## 2024-07-26 ENCOUNTER — CLINICAL SUPPORT (OUTPATIENT)
Dept: PSYCHIATRY | Facility: CLINIC | Age: 29
End: 2024-07-26
Payer: COMMERCIAL

## 2024-07-26 DIAGNOSIS — Z63.0 MARITAL CONFLICT: ICD-10-CM

## 2024-07-26 DIAGNOSIS — F60.5 OBSESSIVE COMPULSIVE PERSONALITY DISORDER: Primary | ICD-10-CM

## 2024-07-26 PROCEDURE — 99999 PR PBB SHADOW E&M-EST. PATIENT-LVL I: CPT | Mod: PBBFAC,,, | Performed by: COUNSELOR

## 2024-07-26 SDOH — SOCIAL DETERMINANTS OF HEALTH (SDOH): PROBLEMS IN RELATIONSHIP WITH SPOUSE OR PARTNER: Z63.0

## 2024-07-26 NOTE — PROGRESS NOTES
Individual Psychotherapy [facility]  7/26/24    Site/Location:  [facility]    Visit Type: 45 min outpt individual psychotherapy    Participants: Ct and this clinician.    Therapeutic Intervention: Met with patient Outpatient - Interactive psychotherapy 45 min - CPT code 10070    Chief complaint/reason for encounter: Depression / Anxiety    Interval history and content of current session:   Client reported no significant change in mood since the previous session.  Discussed information from previous session regarding client tracking how much time he was spending on tasks and being on the phone, Internet.  Client reported that he did not track time but was aware of the things that he did.  He reported that he spent most of his time before bed on his phone or doing tasks like cleaning.  He reported having little interaction with his wife.  Discussed client initiating conversation before they go to bed.  Client also noted that  he will not initiate intimacy with his wife.  Discussed client using words to express his feelings with his wife.  Client also identified going to bed earlier and spending more time talking.  Today client participated in calm breathing, focused attention, and grounding.  He denied any current or recent SI/HI.    Treatment plan:  Target symptoms: depression/anxiety  Why chosen therapy is appropriate versus another modality: Relevant to diagnosis  Outcome monitoring methods: self-report. CSSRS.   Therapeutic intervention type: supportive psychotherapy. IFS therapy, memory reconsolidation.     Risk parameters:  Patient reports no suicidal ideation  Patient reports no homicidal ideation  Patient reports no self-injurious behavior  Patient reports no violent behavior    Verbal deficits: None    Patient's response to intervention:  The patient's response to intervention is accepting.    Progress toward goals and other mental status changes:  The patient's progress toward goals is  good.    Diagnosis:   Obsessive compulsive personality disorder  Marital conflict    Plan:  Individual psychotherapy weekly. Utilize IFS therapy and memory reconsolidation to raise emotional tolerance and decrease negative symptoms. Ct acknowledged plan and is agreement with the plan.    Return to clinic: 1 week    Length of Service (minutes): 45       Each patient to whom he or she provides medical services by telemedicine is: (1) informed of the relationship between the physician and patient and the respective role of any other health care provider with respect to management of the patient; and (2) notified that he or she may decline to receive medical services by telemedicine and may withdraw from such care at any time.

## 2024-08-01 ENCOUNTER — TELEPHONE (OUTPATIENT)
Dept: FAMILY MEDICINE | Facility: CLINIC | Age: 29
End: 2024-08-01
Payer: COMMERCIAL

## 2024-08-01 ENCOUNTER — PATIENT MESSAGE (OUTPATIENT)
Dept: FAMILY MEDICINE | Facility: CLINIC | Age: 29
End: 2024-08-01
Payer: COMMERCIAL

## 2024-08-01 NOTE — TELEPHONE ENCOUNTER
----- Message from Marta San MA sent at 8/1/2024 11:08 AM CDT -----    ----- Message -----  From: Nava Vides  Sent: 8/1/2024  11:00 AM CDT  To: [facility] Staff    Type: Needs Medical Advice  Who Called:  pt  Symptoms (please be specific):    How long has patient had these symptoms:    Pharmacy name and phone #:    Best Call Back Number: [phone]    Additional Information: Pt was referred to nutrition but no name was given  Please call pt to advise of who he supposed to see   Thank you

## 2024-08-01 NOTE — TELEPHONE ENCOUNTER
Please let the patient know that I asked around regarding nutritionist. There is a certified dietician at [employer] named Giovanni Rao. She will see clients who are not members of the club. He should be able to call [employer] and ask for Giovanni.    Most nutrition services are not covered through insurance unless DM or CKD.

## 2024-08-02 ENCOUNTER — CLINICAL SUPPORT (OUTPATIENT)
Dept: PSYCHIATRY | Facility: CLINIC | Age: 29
End: 2024-08-02
Payer: COMMERCIAL

## 2024-08-02 DIAGNOSIS — F60.5 OBSESSIVE COMPULSIVE PERSONALITY DISORDER: Primary | ICD-10-CM

## 2024-08-02 DIAGNOSIS — Z63.0 MARITAL CONFLICT: ICD-10-CM

## 2024-08-02 PROCEDURE — 99999 PR PBB SHADOW E&M-EST. PATIENT-LVL I: CPT | Mod: PBBFAC,,, | Performed by: COUNSELOR

## 2024-08-02 SDOH — SOCIAL DETERMINANTS OF HEALTH (SDOH): PROBLEMS IN RELATIONSHIP WITH SPOUSE OR PARTNER: Z63.0

## 2024-08-02 NOTE — PROGRESS NOTES
Individual Psychotherapy [facility]  8/2/24    Site/Location:  [facility]    Visit Type: 45 min outpt individual psychotherapy    Participants: Ct and this clinician.    Therapeutic Intervention: Met with patient Outpatient - Interactive psychotherapy 45 min - CPT code 10159    Chief complaint/reason for encounter: Depression / Anxiety    Interval history and content of current session:   Client reported no significant change in mood since the previous session.  Discussed information in homework from previous session.  Client reported that he continues to have OCD behaviors but did not note the time spent or various types of distractions that he was involved in.  Discussed client's reluctance to use  more heightened awareness of his behaviors.  Client identified that he sometimes does behaviors for secondary gains, including  being at the cost of other people's feelings and emotions.  Discussed client's relationship with his wife.  He reported that they continue to have relationship issues, especially with communicating with each other.  HW:   client will increase awareness by noting what behaviors may be intentional for secondary gains.  Today client participated in calm breathing, focused attention, and grounding.  He denied any current or recent SI/HI.    Treatment plan:  Target symptoms: depression/anxiety  Why chosen therapy is appropriate versus another modality: Relevant to diagnosis  Outcome monitoring methods: self-report. CSSRS.   Therapeutic intervention type: supportive psychotherapy. IFS therapy, memory reconsolidation.     Risk parameters:  Patient reports no suicidal ideation  Patient reports no homicidal ideation  Patient reports no self-injurious behavior  Patient reports no violent behavior    Verbal deficits: None    Patient's response to intervention:  The patient's response to intervention is accepting.    Progress toward goals and other mental status changes:  The patient's progress toward  goals is good.    Diagnosis:   Obsessive compulsive personality disorder  Marital conflict    Plan:  Individual psychotherapy weekly. Utilize IFS therapy and memory reconsolidation to raise emotional tolerance and decrease negative symptoms. Ct acknowledged plan and is agreement with the plan.    Return to clinic: 1 week    Length of Service (minutes): 45       Each patient to whom he or she provides medical services by telemedicine is: (1) informed of the relationship between the physician and patient and the respective role of any other health care provider with respect to management of the patient; and (2) notified that he or she may decline to receive medical services by telemedicine and may withdraw from such care at any time.

## 2024-08-13 NOTE — PROGRESS NOTES
The patient location is:  [place], [place]  The patient location [place] is: [place]  The patient phone number is: [phone]  Visit type: Virtual visit with synchronous audio and video  Each patient to whom he or she provides medical services by telemedicine is:  (1) informed of the relationship between the physician and patient and the respective role of any other health care provider with respect to management of the patient; and (2) notified that he or she may decline to receive medical services by telemedicine and may withdraw from such care at any time.     Outpatient Psychiatry Follow-Up Visit    Clinical Status of Patient: Outpatient (Ambulatory)  08/13/2024     Chief Complaint:  presenting today for a follow-up.       Interval History and Content of Current Session:  Interim Events/Subjective Report/Content of Current Session:  follow-up appointment.    Pt is a 29 y/o male with past psychiatric hx of anxiety who presents for follow-up treatment. Pt reported               not noticing much difference with the sertraline. Noted some delay in orgasm but stated that he is not concerned. Has reached out to [facility] a few days ago and plans to start therapy soon.     Past Psychiatric hx: Lexapro (inorgasmia), alprazolam recreationally in college    Past Medical hx:   Past Medical History:   Diagnosis Date    Hypertension         Interim hx:  Medication changes last visit: Increase sertraline to 50 mg  Anxiety: consistent with initial visit  Depression: pt denied     Denies suicidal/homicidal ideations.  Denies hopelessness/worthlessness.    Denies auditory/visual hallucinations      Alcohol: Infrequent use  Drug: Pt denies  Tobacco: Pt denies      Review of Systems   PSYCHIATRIC: Pertinent items are noted in the narrative.        CONSTITUTIONAL: weight stable    Past Medical, Family and Social History: The patient's past medical, family and social history have been reviewed and updated as appropriate  within the electronic medical record. See encounter notes.     Current Psychiatric Medication:  sertraline 50 mg     Compliance: yes      Side effects: Pt denies     Risk Parameters:  Patient reports no suicidal ideation  Patient reports no homicidal ideation  Patient reports no self-injurious behavior  Patient reports no violent behavior     Exam (detailed: at least 9 elements; comprehensive: all 15 elements)   Constitutional  Vitals:  Most recent vital signs, dated less than 90 days prior to this appointment, were reviewed. BP: ()/()   Arterial Line BP: ()/()       General:  unremarkable, age appropriate, casual attire, good eye contact, good rapport       Musculoskeletal  Muscle Strength/Tone:  no flaccidity, no tremor    Gait & Station:  normal      Psychiatric                       Speech:  normal tone, normal rate, rhythm, and volume   Mood & Affect:   anxious         Thought Process:   Goal directed; Linear    Associations:   intact   Thought Content:   No SI/HI, delusions, or paranoia, no AV/VH   Insight & Judgement:   Good, adequate to circumstances   Orientation:   grossly intact; alert and oriented x 4    Memory:  intact for content of interview    Language:  grossly intact, can repeat    Attention Span  : Grossly intact for content of interview   Fund of Knowledge:   intact and appropriate to age and level of education        Assessment and Diagnosis   Status/Progress: Based on the examination today, the patient's problem(s) is/are adequately controlled.  New problems have not been presented today. Co-morbidities are not complicating management of the primary condition. There are no active rule-out diagnoses for this patient at this time.      Impression: Pt continues to experience stress consistent with intake session. Will increase dose of sertraline and encouraged pt to engage in therapy.     Diagnosis:   1) Anxiety Disorder  2) R/O Obsessive Compulsive Personality Disorder  3) Marital  Conflict  Intervention/Counseling/Treatment Plan   Medication Management:      1. sertraline 50 mg     2. Call to report any worsening of symptoms or problems with the medication. Pt instructed to go to ER with thoughts of harming self, others     3. Patient given contact # for psychotherapists at [place] and/or [facility] and also instructed to check with insurance for list of providers.     Psychotherapy: none  Target symptoms:   Why chosen therapy is appropriate versus another modality: CBT used; relevant to diagnosis, patient responds to this modality  Outcome monitoring methods: self-report, observation  Therapeutic intervention type: Cognitive Behavioral Therapy  Topics discussed/themes: building skills sets for symptom management, symptom recognition, nutrition, exercise  The patient's response to the intervention is   Patient's response to treatment is: good.   The patient's progress toward treatment goals: improving     Return to clinic: 4-6 weeks    -Cognitive-Behavioral/Supportive therapy and psychoeducation provided  -R/B/SE's of medications discussed with the pt who expresses understanding and chooses to take medications as prescribed.   -Pt instructed to call clinic, 911 or go to nearest emergency room if sxs worsen or pt is in   crisis. The pt expresses understanding.    Marco Burgess, PhD, MP     Antidepressant/Antianxiety Medication Initiation:  Patient informed of risks, benefits, and potential side effects of medication and accepts informed consent.  Common side effects include nausea, fatigue, headache, insomnia., Specifically discussed the possibility of new or worsening suicidal thoughts/depression.  Patient instructed to stop the medication immediately and seek urgent treatment if this occurs. Patient instructed not to abruptly discontinue medication without physician guidance except in cases of sudden onset or worsening of SI.

## 2024-08-14 ENCOUNTER — OFFICE VISIT (OUTPATIENT)
Dept: PSYCHIATRY | Facility: CLINIC | Age: 29
End: 2024-08-14
Payer: COMMERCIAL

## 2024-08-14 VITALS
BODY MASS INDEX: 34.74 KG/M2 | WEIGHT: 262.13 LBS | DIASTOLIC BLOOD PRESSURE: 80 MMHG | SYSTOLIC BLOOD PRESSURE: 137 MMHG | HEIGHT: 73 IN | HEART RATE: 87 BPM

## 2024-08-14 DIAGNOSIS — F41.9 ANXIETY: Primary | ICD-10-CM

## 2024-08-14 DIAGNOSIS — Z63.0 MARITAL CONFLICT: ICD-10-CM

## 2024-08-14 PROCEDURE — G2211 COMPLEX E/M VISIT ADD ON: HCPCS | Mod: S$GLB,,, | Performed by: PSYCHOLOGIST

## 2024-08-14 PROCEDURE — 99214 OFFICE O/P EST MOD 30 MIN: CPT | Mod: S$GLB,,, | Performed by: PSYCHOLOGIST

## 2024-08-14 PROCEDURE — 1159F MED LIST DOCD IN RCRD: CPT | Mod: CPTII,S$GLB,, | Performed by: PSYCHOLOGIST

## 2024-08-14 PROCEDURE — 3075F SYST BP GE 130 - 139MM HG: CPT | Mod: CPTII,S$GLB,, | Performed by: PSYCHOLOGIST

## 2024-08-14 PROCEDURE — 3079F DIAST BP 80-89 MM HG: CPT | Mod: CPTII,S$GLB,, | Performed by: PSYCHOLOGIST

## 2024-08-14 PROCEDURE — 90833 PSYTX W PT W E/M 30 MIN: CPT | Mod: S$GLB,,, | Performed by: PSYCHOLOGIST

## 2024-08-14 PROCEDURE — 3008F BODY MASS INDEX DOCD: CPT | Mod: CPTII,S$GLB,, | Performed by: PSYCHOLOGIST

## 2024-08-14 PROCEDURE — 99999 PR PBB SHADOW E&M-EST. PATIENT-LVL III: CPT | Mod: PBBFAC,,, | Performed by: PSYCHOLOGIST

## 2024-08-14 SDOH — SOCIAL DETERMINANTS OF HEALTH (SDOH): PROBLEMS IN RELATIONSHIP WITH SPOUSE OR PARTNER: Z63.0

## 2024-08-14 NOTE — PROGRESS NOTES
Outpatient Psychiatry Follow-Up Visit    Clinical Status of Patient: Outpatient (Ambulatory)  08/14/2024     Chief Complaint:  presenting today for a follow-up.       Interval History and Content of Current Session:  Interim Events/Subjective Report/Content of Current Session:  follow-up appointment.    Pt is a 29 y/o male with past psychiatric hx of anxiety who presents for follow-up treatment. Pt reported that he continues to experience OCD symptoms. Described mostly cleaning and organization behaviors. Stated that it impacts his work and and his home life. Pt discussed his wife's concerns with anxiety and control maladaptive coping strategy. Stated that he has been working on mindfulness with therapist. Pt denied having any inorgasmia concerns.     Past Psychiatric hx: Lexapro (inorgasmia), alprazolam, Vyvanse, Adderall recreationally in college    Past Medical hx:   Past Medical History:   Diagnosis Date    Hypertension         Interim hx:  Medication changes last visit: Increase sertraline to 50 mg  Anxiety: consistent with initial visit  Depression: pt denied     Denies suicidal/homicidal ideations.  Denies hopelessness/worthlessness.    Denies auditory/visual hallucinations      Alcohol: Infrequent use  Drug: Pt denies  Tobacco: Pt denies      Review of Systems   PSYCHIATRIC: Pertinent items are noted in the narrative.        CONSTITUTIONAL: weight stable    Past Medical, Family and Social History: The patient's past medical, family and social history have been reviewed and updated as appropriate within the electronic medical record. See encounter notes.     Current Psychiatric Medication:  sertraline 50 mg     Compliance: yes      Side effects: Pt denies     Risk Parameters:  Patient reports no suicidal ideation  Patient reports no homicidal ideation  Patient reports no self-injurious behavior  Patient reports no violent behavior     Exam (detailed: at least 9 elements; comprehensive: all 15 elements)    Constitutional  Vitals:  Most recent vital signs, dated less than 90 days prior to this appointment, were reviewed. Pulse:  [87]   BP: (137)/(80)       General:  unremarkable, age appropriate, casual attire, good eye contact, good rapport       Musculoskeletal  Muscle Strength/Tone:  no flaccidity, no tremor    Gait & Station:  normal      Psychiatric                       Speech:  normal tone, normal rate, rhythm, and volume   Mood & Affect:   anxious         Thought Process:   Goal directed; Linear    Associations:   intact   Thought Content:   No SI/HI, delusions, or paranoia, no AV/VH   Insight & Judgement:   Good, adequate to circumstances   Orientation:   grossly intact; alert and oriented x 4    Memory:  intact for content of interview    Language:  grossly intact, can repeat    Attention Span  : Grossly intact for content of interview   Fund of Knowledge:   intact and appropriate to age and level of education        Assessment and Diagnosis   Status/Progress: Based on the examination today, the patient's problem(s) is/are adequately controlled.  New problems have not been presented today. Co-morbidities are not complicating management of the primary condition. There are no active rule-out diagnoses for this patient at this time.      Impression: Pt continues to experience anxiety and marital stress. We discussed marital dynamic and insights to be gleaned. Also explored health communication strategies. Will increase dose of sertraline to 100 mg and encouraged pt to continue in therapy.     Diagnosis:   1) Anxiety Disorder  2) R/O Obsessive Compulsive Personality Disorder  3) Marital Conflict  Intervention/Counseling/Treatment Plan   Medication Management:      1. Increase sertraline to 100 mg     2. Call to report any worsening of symptoms or problems with the medication. Pt instructed to go to ER with thoughts of harming self, others     3. Cont in therapy with Coleman Maravilla LPC    Psychotherapy: 20  minutes  Target symptoms: anxiety  Why chosen therapy is appropriate versus another modality: CBT used; relevant to diagnosis, patient responds to this modality  Outcome monitoring methods: self-report, observation  Therapeutic intervention type: Exercise, ERP psychoeducation  Topics discussed/themes: building skills sets for symptom management, symptom recognition, nutrition, exercise  The patient's response to the intervention is accepting  Patient's response to treatment is: good.   The patient's progress toward treatment goals: improving     Return to clinic: 4-6 weeks    -Cognitive-Behavioral/Supportive therapy and psychoeducation provided  -R/B/SE's of medications discussed with the pt who expresses understanding and chooses to take medications as prescribed.   -Pt instructed to call clinic, 911 or go to nearest emergency room if sxs worsen or pt is in   crisis. The pt expresses understanding.    Marco Burgess, PhD, MP    Visit today included increased complexity associated with the care of the episodic problem anxiety addressed and managing the longitudinal care of the patient due to the serious and/or complex managed problem(s) anxiety.      Antidepressant/Antianxiety Medication Initiation:  Patient informed of risks, benefits, and potential side effects of medication and accepts informed consent.  Common side effects include nausea, fatigue, headache, insomnia., Specifically discussed the possibility of new or worsening suicidal thoughts/depression.  Patient instructed to stop the medication immediately and seek urgent treatment if this occurs. Patient instructed not to abruptly discontinue medication without physician guidance except in cases of sudden onset or worsening of SI.

## 2024-08-23 ENCOUNTER — CLINICAL SUPPORT (OUTPATIENT)
Dept: PSYCHIATRY | Facility: CLINIC | Age: 29
End: 2024-08-23
Payer: COMMERCIAL

## 2024-08-23 DIAGNOSIS — F60.5 OBSESSIVE COMPULSIVE PERSONALITY DISORDER: Primary | ICD-10-CM

## 2024-08-23 PROCEDURE — 99999 PR PBB SHADOW E&M-EST. PATIENT-LVL I: CPT | Mod: PBBFAC,,, | Performed by: COUNSELOR

## 2024-08-23 RX ORDER — SERTRALINE HYDROCHLORIDE 100 MG/1
100 TABLET, FILM COATED ORAL DAILY
Qty: 30 TABLET | Refills: 1 | Status: SHIPPED | OUTPATIENT
Start: 2024-08-23 | End: 2025-08-23

## 2024-08-23 NOTE — TELEPHONE ENCOUNTER
Patient was in clinic today seeing Coleman, but stopped to report that at his last visit with Dr. Burgess, his sertraline was increased to 100mg however, a new Rx was not sent to pharmacy. He states he only has a couple of days left of [id number]. Advised patient I would send refill request to provider for provider. Patient only has enough meds to last through Sunday.

## 2024-08-23 NOTE — PROGRESS NOTES
Individual Psychotherapy [facility]  8/23/24    Site/Location:  [facility]    Visit Type: 45 min outpt individual psychotherapy    Participants: Ct and this clinician.    Therapeutic Intervention: Met with patient Outpatient - Interactive psychotherapy 45 min - CPT code 28818    Chief complaint/reason for encounter: Depression / Anxiety    Interval history and content of current session:   Client reported no significant change in mood since the previous session.  Client reported that he discussed a medicine change with his prescriber increasing his Zoloft to 100 mg.  Client reported that he was thinking he may have ADHD and wanted to discuss this.  This clinician does not note ADHD symptoms in related this to the client.  Client reported that he experiences at the most mild anxiety and depressed mood.   Discussed how his OCD behaviors may be triggered.  Client reported that he feels a sense of relief when completing an OCD behavior.  Discussed information from previous session regarding client being aware of behaviors and noting them.  He reported that he has not done this yet but will be more vigilant on this throughout the week.  Today client participated in calm breathing, focused attention, and grounding.  He denied any current or recent SI/HI.    Treatment plan:  Target symptoms: depression/anxiety  Why chosen therapy is appropriate versus another modality: Relevant to diagnosis  Outcome monitoring methods: self-report. CSSRS.   Therapeutic intervention type: supportive psychotherapy. IFS therapy, memory reconsolidation.     Risk parameters:  Patient reports no suicidal ideation  Patient reports no homicidal ideation  Patient reports no self-injurious behavior  Patient reports no violent behavior    Verbal deficits: None    Patient's response to intervention:  The patient's response to intervention is accepting.    Progress toward goals and other mental status changes:  The patient's progress toward goals  is good.    Diagnosis:   Obsessive compulsive personality disorder    Plan:  Individual psychotherapy weekly. Utilize IFS therapy and memory reconsolidation to raise emotional tolerance and decrease negative symptoms. Ct acknowledged plan and is agreement with the plan.    Return to clinic: 1 week    Length of Service (minutes): 45       Each patient to whom he or she provides medical services by telemedicine is: (1) informed of the relationship between the physician and patient and the respective role of any other health care provider with respect to management of the patient; and (2) notified that he or she may decline to receive medical services by telemedicine and may withdraw from such care at any time.

## 2024-08-30 ENCOUNTER — CLINICAL SUPPORT (OUTPATIENT)
Dept: PSYCHIATRY | Facility: CLINIC | Age: 29
End: 2024-08-30
Payer: COMMERCIAL

## 2024-08-30 DIAGNOSIS — F60.5 OBSESSIVE COMPULSIVE PERSONALITY DISORDER: Primary | ICD-10-CM

## 2024-08-30 DIAGNOSIS — Z63.0 MARITAL CONFLICT: ICD-10-CM

## 2024-08-30 PROCEDURE — 99999 PR PBB SHADOW E&M-EST. PATIENT-LVL I: CPT | Mod: PBBFAC,,, | Performed by: COUNSELOR

## 2024-08-30 SDOH — SOCIAL DETERMINANTS OF HEALTH (SDOH): PROBLEMS IN RELATIONSHIP WITH SPOUSE OR PARTNER: Z63.0

## 2024-08-30 NOTE — PROGRESS NOTES
Individual Psychotherapy [facility]  8/30/24    Site/Location:  [facility]    Visit Type: 45 min outpt individual psychotherapy    Participants: Ct and this clinician.    Therapeutic Intervention: Met with patient Outpatient - Interactive psychotherapy 45 min - CPT code 08325    Chief complaint/reason for encounter: Depression / Anxiety    Interval history and content of current session:   Client reported no significant change in mood since the previous session.  Client reported that he was not felt any change in behavior or mood since his increase in Zoloft over the past 3 weeks.  Discussed the judging mind.  Client reported that he interjects conversations with his opinions.  He reported that when someone is talking he was already formulating his opinions and feedback without fully listening to the person.  He admitted that he may be doing this for personal or selfish reasons.  Discussed ideas of living in the present.  Discussed practicing active listening and noticing thought behaviors and body sensations  while trying to improve listening.  Today client participated in calm breathing, focused attention, and grounding.  He denied any current or recent SI/HI.    Treatment plan:  Target symptoms: depression/anxiety  Why chosen therapy is appropriate versus another modality: Relevant to diagnosis  Outcome monitoring methods: self-report. CSSRS.   Therapeutic intervention type: supportive psychotherapy. IFS therapy, memory reconsolidation.     Risk parameters:  Patient reports no suicidal ideation  Patient reports no homicidal ideation  Patient reports no self-injurious behavior  Patient reports no violent behavior    Verbal deficits: None    Patient's response to intervention:  The patient's response to intervention is accepting.    Progress toward goals and other mental status changes:  The patient's progress toward goals is good.    Diagnosis:   Obsessive compulsive personality disorder  Marital  conflict    Plan:  Individual psychotherapy weekly. Utilize IFS therapy and memory reconsolidation to raise emotional tolerance and decrease negative symptoms. Ct acknowledged plan and is agreement with the plan.    Return to clinic: 1 week    Length of Service (minutes): 45       Each patient to whom he or she provides medical services by telemedicine is: (1) informed of the relationship between the physician and patient and the respective role of any other health care provider with respect to management of the patient; and (2) notified that he or she may decline to receive medical services by telemedicine and may withdraw from such care at any time.

## 2024-09-06 ENCOUNTER — CLINICAL SUPPORT (OUTPATIENT)
Dept: PSYCHIATRY | Facility: CLINIC | Age: 29
End: 2024-09-06
Payer: COMMERCIAL

## 2024-09-06 DIAGNOSIS — F60.5 OBSESSIVE COMPULSIVE PERSONALITY DISORDER: Primary | ICD-10-CM

## 2024-09-06 DIAGNOSIS — Z63.0 MARITAL CONFLICT: ICD-10-CM

## 2024-09-06 PROCEDURE — 99999 PR PBB SHADOW E&M-EST. PATIENT-LVL I: CPT | Mod: PBBFAC,,, | Performed by: COUNSELOR

## 2024-09-06 SDOH — SOCIAL DETERMINANTS OF HEALTH (SDOH): PROBLEMS IN RELATIONSHIP WITH SPOUSE OR PARTNER: Z63.0

## 2024-09-06 NOTE — PROGRESS NOTES
Individual Psychotherapy [facility]  9/6/24    Site/Location:  [facility]    Visit Type: 45 min outpt individual psychotherapy    Participants: Ct and this clinician.    Therapeutic Intervention: Met with patient Outpatient - Interactive psychotherapy 45 min - CPT code 17221    Chief complaint/reason for encounter: Depression / Anxiety    Interval history and content of current session:   Client reported no significant change in mood since the previous session.  Discussed information from previous session regarding client improving active listening and being aware of feelings and emotions in real-time.  Client reported that at a football gathering he try to noticed his engagement in conversation and trying to lay back and using listening skill.  He reported that he was able to do it for a short period then reverted back to wanting to take control of the conversation.  Client identified that he feels he needs to take control of the conversation to prove to himself that he is not wrong.  Today client participated in calm breathing, focused attention, and grounding.  He denied any current or recent SI/HI.    Treatment plan:  Target symptoms: depression/anxiety  Why chosen therapy is appropriate versus another modality: Relevant to diagnosis  Outcome monitoring methods: self-report. CSSRS.   Therapeutic intervention type: supportive psychotherapy. IFS therapy, memory reconsolidation.     Risk parameters:  Patient reports no suicidal ideation  Patient reports no homicidal ideation  Patient reports no self-injurious behavior  Patient reports no violent behavior    Verbal deficits: None    Patient's response to intervention:  The patient's response to intervention is accepting.    Progress toward goals and other mental status changes:  The patient's progress toward goals is good.    Diagnosis:   Obsessive compulsive personality disorder  Marital conflict    Plan:  Individual psychotherapy weekly. Utilize IFS therapy  and memory reconsolidation to raise emotional tolerance and decrease negative symptoms. Ct acknowledged plan and is agreement with the plan.    Return to clinic: 1 week    Length of Service (minutes): 45       Each patient to whom he or she provides medical services by telemedicine is: (1) informed of the relationship between the physician and patient and the respective role of any other health care provider with respect to management of the patient; and (2) notified that he or she may decline to receive medical services by telemedicine and may withdraw from such care at any time.

## 2024-09-20 ENCOUNTER — CLINICAL SUPPORT (OUTPATIENT)
Dept: PSYCHIATRY | Facility: CLINIC | Age: 29
End: 2024-09-20
Payer: COMMERCIAL

## 2024-09-20 DIAGNOSIS — Z63.0 MARITAL CONFLICT: ICD-10-CM

## 2024-09-20 DIAGNOSIS — F60.5 OBSESSIVE COMPULSIVE PERSONALITY DISORDER: Primary | ICD-10-CM

## 2024-09-20 PROCEDURE — 99999 PR PBB SHADOW E&M-EST. PATIENT-LVL I: CPT | Mod: PBBFAC,,, | Performed by: COUNSELOR

## 2024-09-20 SDOH — SOCIAL DETERMINANTS OF HEALTH (SDOH): PROBLEMS IN RELATIONSHIP WITH SPOUSE OR PARTNER: Z63.0

## 2024-09-20 NOTE — PROGRESS NOTES
Individual Psychotherapy [facility]  9/20/24    Site/Location:  [facility]    Visit Type: 45 min outpt individual psychotherapy    Participants: Ct and this clinician.    Therapeutic Intervention: Met with patient Outpatient - Interactive psychotherapy 45 min - CPT code 83113    Chief complaint/reason for encounter: Depression / Anxiety    Interval history and content of current session:   Client reported no significant change in mood since the previous session discussed information from previous session regarding client noticing thoughts and behaviors in real-time.  Client reported that he has been noticing more of his thoughts  and behaviors.  Client reported that he continues to have impulses and urges to do things that he understands may not be using good moral judgment.  Client reported that he can be conflicted about his emotional and memorial attachment to people, things, and the world around him. HW:   Client will note important things in his life to discuss in next session.  Today client participated in grounding and calm breathing.  He denied any current or recent SI/HI.  Treatment plan:  Target symptoms: depression/anxiety  Why chosen therapy is appropriate versus another modality: Relevant to diagnosis  Outcome monitoring methods: self-report. CSSRS.   Therapeutic intervention type: supportive psychotherapy. IFS therapy, memory reconsolidation.     Risk parameters:  Patient reports no suicidal ideation  Patient reports no homicidal ideation  Patient reports no self-injurious behavior  Patient reports no violent behavior    Verbal deficits: None    Patient's response to intervention:  The patient's response to intervention is accepting.    Progress toward goals and other mental status changes:  The patient's progress toward goals is good.    Diagnosis:   Obsessive compulsive personality disorder  Marital conflict       Plan:  Individual psychotherapy weekly. Utilize IFS therapy and memory  reconsolidation to raise emotional tolerance and decrease negative symptoms. Ct acknowledged plan and is agreement with the plan.    Return to clinic: 1 week    Length of Service (minutes): 45       Each patient to whom he or she provides medical services by telemedicine is: (1) informed of the relationship between the physician and patient and the respective role of any other health care provider with respect to management of the patient; and (2) notified that he or she may decline to receive medical services by telemedicine and may withdraw from such care at any time.

## 2024-09-26 ENCOUNTER — TELEPHONE (OUTPATIENT)
Dept: PSYCHIATRY | Facility: CLINIC | Age: 29
End: 2024-09-26
Payer: COMMERCIAL

## 2024-09-26 NOTE — TELEPHONE ENCOUNTER
PATIENT called and left message on voice mail @ 409 PM that he would not make 430 PM visit  he is late cancel and to call him tomorrow for reschedule of this visit

## 2024-09-27 ENCOUNTER — CLINICAL SUPPORT (OUTPATIENT)
Dept: PSYCHIATRY | Facility: CLINIC | Age: 29
End: 2024-09-27
Payer: COMMERCIAL

## 2024-09-27 DIAGNOSIS — Z63.0 MARITAL CONFLICT: ICD-10-CM

## 2024-09-27 DIAGNOSIS — F60.5 OBSESSIVE COMPULSIVE PERSONALITY DISORDER: Primary | ICD-10-CM

## 2024-09-27 PROCEDURE — 99999 PR PBB SHADOW E&M-EST. PATIENT-LVL I: CPT | Mod: PBBFAC,,, | Performed by: COUNSELOR

## 2024-09-27 SDOH — SOCIAL DETERMINANTS OF HEALTH (SDOH): PROBLEMS IN RELATIONSHIP WITH SPOUSE OR PARTNER: Z63.0

## 2024-09-27 NOTE — PROGRESS NOTES
"Individual Psychotherapy [facility]  9/27/24    Site/Location:  [facility]    Visit Type: 45 min outpt individual psychotherapy    Participants: Ct and this clinician.    Therapeutic Intervention: Met with patient Outpatient - Interactive psychotherapy 45 min - CPT code 21003    Chief complaint/reason for encounter: Depression / Anxiety    Interval history and content of current session: Client reported no significant change in mood since the previous session.  Discussed information from previous session regarding client identifying areas of his life that he finds important and fulfilling.  Client identified the "health safety and prosperity of my family".  Discussed areas where client continues to struggle with OCD behaviors.  Client identified that he finds he needs a reward for things that he does so he gravitates towards activities that provide reward.  He gave examples of using apps like [employer], [employer], and [employer] that offer awards for purchasing more items.  He reported  that although he wants to watch his diet and weight he feels a compulsion to find areas where he can find reward invalidation.  Today client participated in grounding and calm breathing.  He denied any current or recent SI/HI.    Treatment plan:  Target symptoms: depression/anxiety  Why chosen therapy is appropriate versus another modality: Relevant to diagnosis  Outcome monitoring methods: self-report. CSSRS.   Therapeutic intervention type: supportive psychotherapy. IFS therapy, memory reconsolidation.     Risk parameters:  Patient reports no suicidal ideation  Patient reports no homicidal ideation  Patient reports no self-injurious behavior  Patient reports no violent behavior    Verbal deficits: None    Patient's response to intervention:  The patient's response to intervention is accepting.    Progress toward goals and other mental status changes:  The patient's progress toward goals is good.    Diagnosis:   Obsessive " compulsive personality disorder  Marital conflict    Plan:  Individual psychotherapy weekly. Utilize IFS therapy and memory reconsolidation to raise emotional tolerance and decrease negative symptoms. Ct acknowledged plan and is agreement with the plan.    Return to clinic: 1 week    Length of Service (minutes): 45       Each patient to whom he or she provides medical services by telemedicine is: (1) informed of the relationship between the physician and patient and the respective role of any other health care provider with respect to management of the patient; and (2) notified that he or she may decline to receive medical services by telemedicine and may withdraw from such care at any time.

## 2024-09-27 NOTE — TELEPHONE ENCOUNTER
Patient is coming in to see Coleman today and we will reschedule his Dr Burgess visit when he is checking out this evening

## 2024-10-01 NOTE — PROGRESS NOTES
The patient location is:  [place], [place]  The patient location [place] is: [place]  The patient phone number is: [phone]  Visit type: Virtual visit with synchronous audio and video  Each patient to whom he or she provides medical services by telemedicine is:  (1) informed of the relationship between the physician and patient and the respective role of any other health care provider with respect to management of the patient; and (2) notified that he or she may decline to receive medical services by telemedicine and may withdraw from such care at any time.     Outpatient Psychiatry Follow-Up Visit    Clinical Status of Patient: Outpatient (Ambulatory)  10/02/2024     Chief Complaint:  presenting today for a follow-up.       Interval History and Content of Current Session:  Interim Events/Subjective Report/Content of Current Session:  follow-up appointment.    Pt is a 29 y/o male with past psychiatric hx of anxiety who presents for follow-up treatment. Pt reported that he thinks the increased dose of sertraline is helping with anxiety. Stated that he continues to have some stress at home but is managing it relatively well. Continues in therapy with positive results.     Past Psychiatric hx: Lexapro (inorgasmia), alprazolam, Vyvanse, Adderall recreationally in college    Past Medical hx:   Past Medical History:   Diagnosis Date    Hypertension         Interim hx:  Medication changes last visit: Increase sertraline to 100 mg  Anxiety: consistent with initial visit  Depression: pt denied     Denies suicidal/homicidal ideations.  Denies hopelessness/worthlessness.    Denies auditory/visual hallucinations      Alcohol: Infrequent use  Drug: Pt denies  Tobacco: Pt denies      Review of Systems   PSYCHIATRIC: Pertinent items are noted in the narrative.        CONSTITUTIONAL: weight stable    Past Medical, Family and Social History: The patient's past medical, family and social history have been reviewed and updated as  "appropriate within the electronic medical record. See encounter notes.     Current Psychiatric Medication:  sertraline 100 mg     Compliance: yes      Side effects: Pt denies     Risk Parameters:  Patient reports no suicidal ideation  Patient reports no homicidal ideation  Patient reports no self-injurious behavior  Patient reports no violent behavior     Exam (detailed: at least 9 elements; comprehensive: all 15 elements)   Constitutional  Vitals:  Most recent vital signs, dated less than 90 days prior to this appointment, were reviewed. BP: ()/()   Arterial Line BP: ()/()       General:  unremarkable, age appropriate, casual attire, good eye contact, good rapport       Musculoskeletal  Muscle Strength/Tone:  no flaccidity, no tremor    Gait & Station:  normal      Psychiatric                       Speech:  normal tone, normal rate, rhythm, and volume   Mood & Affect:   anxious         Thought Process:   Goal directed; Linear    Associations:   intact   Thought Content:   No SI/HI, delusions, or paranoia, no AV/VH   Insight & Judgement:   Good, adequate to circumstances   Orientation:   grossly intact; alert and oriented x 4    Memory:  intact for content of interview    Language:  grossly intact, can repeat    Attention Span  : Grossly intact for content of interview   Fund of Knowledge:   intact and appropriate to age and level of education        Assessment and Diagnosis   Status/Progress: Based on the examination today, the patient's problem(s) is/are adequately controlled.  New problems have not been presented today. Co-morbidities are not complicating management of the primary condition. There are no active rule-out diagnoses for this patient at this time.      Impression: Pt continues to experience anxiety and marital stress. Continues to be concerned about "OCD"; however, descriptions sound more like [facility]. Further clarification is needed. Will increase dose of sertraline to 150 mg and encouraged pt to " continue in therapy.     Diagnosis:   1) Anxiety Disorder  2) R/O Obsessive Compulsive Personality Disorder  3) Marital Conflict  Intervention/Counseling/Treatment Plan   Medication Management:      1. Increase Sertraline 150 mg     2. Call to report any worsening of symptoms or problems with the medication. Pt instructed to go to ER with thoughts of harming self, others     3. Cont in therapy with Coleman Maravilla LPC    Psychotherapy: none  Target symptoms: anxiety  Why chosen therapy is appropriate versus another modality: CBT used; relevant to diagnosis, patient responds to this modality  Outcome monitoring methods: self-report, observation  Therapeutic intervention type: Exercise, ERP psychoeducation  Topics discussed/themes: building skills sets for symptom management, symptom recognition, nutrition, exercise  The patient's response to the intervention is accepting  Patient's response to treatment is: good.   The patient's progress toward treatment goals: improving     Return to clinic: 4-6 weeks    -Cognitive-Behavioral/Supportive therapy and psychoeducation provided  -R/B/SE's of medications discussed with the pt who expresses understanding and chooses to take medications as prescribed.   -Pt instructed to call clinic, 911 or go to nearest emergency room if sxs worsen or pt is in   crisis. The pt expresses understanding.    Marco Burgess, PhD, MP    Visit today included increased complexity associated with the care of the episodic problem anxiety addressed and managing the longitudinal care of the patient due to the serious and/or complex managed problem(s) anxiety.      Antidepressant/Antianxiety Medication Initiation:  Patient informed of risks, benefits, and potential side effects of medication and accepts informed consent.  Common side effects include nausea, fatigue, headache, insomnia., Specifically discussed the possibility of new or worsening suicidal thoughts/depression.  Patient instructed to stop  the medication immediately and seek urgent treatment if this occurs. Patient instructed not to abruptly discontinue medication without physician guidance except in cases of sudden onset or worsening of SI.

## 2024-10-02 ENCOUNTER — OFFICE VISIT (OUTPATIENT)
Dept: PSYCHIATRY | Facility: CLINIC | Age: 29
End: 2024-10-02
Payer: COMMERCIAL

## 2024-10-02 ENCOUNTER — TELEPHONE (OUTPATIENT)
Dept: PSYCHIATRY | Facility: CLINIC | Age: 29
End: 2024-10-02
Payer: COMMERCIAL

## 2024-10-02 DIAGNOSIS — F60.5 OBSESSIVE COMPULSIVE PERSONALITY DISORDER: ICD-10-CM

## 2024-10-02 DIAGNOSIS — Z63.0 MARITAL CONFLICT: ICD-10-CM

## 2024-10-02 DIAGNOSIS — F41.9 ANXIETY: Primary | ICD-10-CM

## 2024-10-02 PROCEDURE — 99214 OFFICE O/P EST MOD 30 MIN: CPT | Mod: 95,,, | Performed by: PSYCHOLOGIST

## 2024-10-02 PROCEDURE — G2211 COMPLEX E/M VISIT ADD ON: HCPCS | Mod: 95,,, | Performed by: PSYCHOLOGIST

## 2024-10-02 PROCEDURE — 1159F MED LIST DOCD IN RCRD: CPT | Mod: CPTII,95,, | Performed by: PSYCHOLOGIST

## 2024-10-02 RX ORDER — SERTRALINE HYDROCHLORIDE 100 MG/1
150 TABLET, FILM COATED ORAL DAILY
Qty: 45 TABLET | Refills: 1 | Status: SHIPPED | OUTPATIENT
Start: 2024-10-02 | End: 2025-10-02

## 2024-10-02 SDOH — SOCIAL DETERMINANTS OF HEALTH (SDOH): PROBLEMS IN RELATIONSHIP WITH SPOUSE OR PARTNER: Z63.0

## 2024-10-02 NOTE — TELEPHONE ENCOUNTER
----- Message from Marco Burgess sent at 10/2/2024  4:46 PM CDT -----  Regarding: apt  Please contact pt for a f/u visit in 4-6 weeks.

## 2024-10-11 ENCOUNTER — CLINICAL SUPPORT (OUTPATIENT)
Dept: PSYCHIATRY | Facility: CLINIC | Age: 29
End: 2024-10-11
Payer: COMMERCIAL

## 2024-10-11 DIAGNOSIS — F60.5 OBSESSIVE COMPULSIVE PERSONALITY DISORDER: Primary | ICD-10-CM

## 2024-10-11 DIAGNOSIS — Z63.0 MARITAL CONFLICT: ICD-10-CM

## 2024-10-11 PROCEDURE — 99999 PR PBB SHADOW E&M-EST. PATIENT-LVL I: CPT | Mod: PBBFAC,,, | Performed by: COUNSELOR

## 2024-10-11 SDOH — SOCIAL DETERMINANTS OF HEALTH (SDOH): PROBLEMS IN RELATIONSHIP WITH SPOUSE OR PARTNER: Z63.0

## 2024-10-11 NOTE — PROGRESS NOTES
Individual Psychotherapy [facility]  10/11/24    Site/Location:  [facility]    Visit Type: 45 min outpt individual psychotherapy    Participants: Ct and this clinician.    Therapeutic Intervention: Met with patient Outpatient - Interactive psychotherapy 45 min - CPT code 48150    Chief complaint/reason for encounter: Depression / Anxiety    Interval history and content of current session:   Client reported no significant change in mood since the previous session.  Client reported that he had an argument with his wife regarding his behavior.  Client reported that he added a UPS  battery system to his wife's computer without letting her know.  He reported that she became very upset with him because she thought it would compromise the security with her work computer.   He also reported another incident where he was with family and stood on new concrete to prove a point about the curing time concrete.  He reported he was asked not to stand on it but did so anyway.  His wife was again upset with his behavior.  In both incidents he apologized for his behaviors.  Discussed client's compulsion in what may have drove him to do these things.  Client identified that it was not reward oriented.  Discussed personality conflicts that may have caused his behaviors including inflated ego.  Discussed ways to improve behavior.  Client identified using active listening skills  and being aware how this effects his thoughts and body sensations.  Today client participated in grounding in calm breathing.  He denied any current or recent SI/HI.    Treatment plan:  Target symptoms: depression/anxiety  Why chosen therapy is appropriate versus another modality: Relevant to diagnosis  Outcome monitoring methods: self-report. CSSRS.   Therapeutic intervention type: supportive psychotherapy. IFS therapy, memory reconsolidation.     Risk parameters:  Patient reports no suicidal ideation  Patient reports no homicidal ideation  Patient reports  no self-injurious behavior  Patient reports no violent behavior    Verbal deficits: None    Patient's response to intervention:  The patient's response to intervention is accepting.    Progress toward goals and other mental status changes:  The patient's progress toward goals is good.    Diagnosis:   Obsessive compulsive personality disorder  Marital conflict    Plan:  Individual psychotherapy weekly. Utilize IFS therapy and memory reconsolidation to raise emotional tolerance and decrease negative symptoms. Ct acknowledged plan and is agreement with the plan.    Return to clinic: 1 week    Length of Service (minutes): 45       Each patient to whom he or she provides medical services by telemedicine is: (1) informed of the relationship between the physician and patient and the respective role of any other health care provider with respect to management of the patient; and (2) notified that he or she may decline to receive medical services by telemedicine and may withdraw from such care at any time.

## 2024-10-18 ENCOUNTER — CLINICAL SUPPORT (OUTPATIENT)
Dept: PSYCHIATRY | Facility: CLINIC | Age: 29
End: 2024-10-18
Payer: COMMERCIAL

## 2024-10-18 DIAGNOSIS — F60.5 OBSESSIVE COMPULSIVE PERSONALITY DISORDER: Primary | ICD-10-CM

## 2024-10-18 DIAGNOSIS — Z63.0 MARITAL CONFLICT: ICD-10-CM

## 2024-10-18 PROCEDURE — 99999 PR PBB SHADOW E&M-EST. PATIENT-LVL I: CPT | Mod: PBBFAC,,, | Performed by: COUNSELOR

## 2024-10-18 SDOH — SOCIAL DETERMINANTS OF HEALTH (SDOH): PROBLEMS IN RELATIONSHIP WITH SPOUSE OR PARTNER: Z63.0

## 2024-10-18 NOTE — PROGRESS NOTES
Individual Psychotherapy [facility]  10/18/24    Site/Location:  [facility]    Visit Type: 45 min outpt individual psychotherapy    Participants: Ct and this clinician.    Therapeutic Intervention: Met with patient Outpatient - Interactive psychotherapy 45 min - CPT code 82971    Chief complaint/reason for encounter: Depression / Anxiety    Interval history and content of current session:   Client reported no significant change in mood since the previous session.  Discussed information from previous session regarding client using active listening skills, particularly at work.  Client reported that  he did not engage in active listening skills over the past week.  Client reported that he did notice while driving in the car with his wife that he came distracted and aware that he spent time on his phone and disengaged from their conversation.  Discussed ways to modify behaviors in lessen distraction around him.  Client identified putting his cell phone away while driving, particularly with his wife while using active listening skills.   Today client participated in grounding and calm breathing.  He denied any current or recent SI/HI.    Treatment plan:  Target symptoms: depression/anxiety  Why chosen therapy is appropriate versus another modality: Relevant to diagnosis  Outcome monitoring methods: self-report. CSSRS.   Therapeutic intervention type: supportive psychotherapy. IFS therapy, memory reconsolidation.     Risk parameters:  Patient reports no suicidal ideation  Patient reports no homicidal ideation  Patient reports no self-injurious behavior  Patient reports no violent behavior    Verbal deficits: None    Patient's response to intervention:  The patient's response to intervention is accepting.    Progress toward goals and other mental status changes:  The patient's progress toward goals is good.    Diagnosis:   Obsessive compulsive personality disorder  Marital conflict    Plan:  Individual psychotherapy  weekly. Utilize IFS therapy and memory reconsolidation to raise emotional tolerance and decrease negative symptoms. Ct acknowledged plan and is agreement with the plan.    Return to clinic: 1 week    Length of Service (minutes): 45       Each patient to whom he or she provides medical services by telemedicine is: (1) informed of the relationship between the physician and patient and the respective role of any other health care provider with respect to management of the patient; and (2) notified that he or she may decline to receive medical services by telemedicine and may withdraw from such care at any time.

## 2024-10-22 ENCOUNTER — OFFICE VISIT (OUTPATIENT)
Dept: PSYCHIATRY | Facility: CLINIC | Age: 29
End: 2024-10-22
Payer: COMMERCIAL

## 2024-10-22 DIAGNOSIS — F41.9 ANXIETY: Primary | ICD-10-CM

## 2024-10-22 DIAGNOSIS — Z63.0 MARITAL CONFLICT: ICD-10-CM

## 2024-10-22 PROCEDURE — 1159F MED LIST DOCD IN RCRD: CPT | Mod: CPTII,95,, | Performed by: PSYCHOLOGIST

## 2024-10-22 PROCEDURE — G2211 COMPLEX E/M VISIT ADD ON: HCPCS | Mod: 95,,, | Performed by: PSYCHOLOGIST

## 2024-10-22 PROCEDURE — 99214 OFFICE O/P EST MOD 30 MIN: CPT | Mod: 95,,, | Performed by: PSYCHOLOGIST

## 2024-10-22 RX ORDER — SERTRALINE HYDROCHLORIDE 100 MG/1
200 TABLET, FILM COATED ORAL DAILY
Qty: 60 TABLET | Refills: 1 | Status: SHIPPED | OUTPATIENT
Start: 2024-10-22 | End: 2025-10-22

## 2024-10-22 SDOH — SOCIAL DETERMINANTS OF HEALTH (SDOH): PROBLEMS IN RELATIONSHIP WITH SPOUSE OR PARTNER: Z63.0

## 2024-10-22 NOTE — PROGRESS NOTES
The patient location is:  [place], [place]  The patient location [place] is: [place]  The patient phone number is: [phone]  Visit type: Virtual visit with synchronous audio and video  Each patient to whom he or she provides medical services by telemedicine is:  (1) informed of the relationship between the physician and patient and the respective role of any other health care provider with respect to management of the patient; and (2) notified that he or she may decline to receive medical services by telemedicine and may withdraw from such care at any time.     Outpatient Psychiatry Follow-Up Visit    Clinical Status of Patient: Outpatient (Ambulatory)  10/22/2024     Chief Complaint:  presenting today for a follow-up.       Interval History and Content of Current Session:  Interim Events/Subjective Report/Content of Current Session:  follow-up appointment.    Pt is a 27 y/o male with past psychiatric hx of anxiety who presents for follow-up treatment. Pt reported that he is doing well. Stated that he is sweating a little more than in the past. Has been intentional about replacing lost electrolytes. Stated that he has noticed some decrease in anxiety but would like to continue increasing to manage compulsive behaviors. Continues in therapy.     Past Psychiatric hx: Lexapro (inorgasmia), alprazolam, Vyvanse, Adderall recreationally in college    Past Medical hx:   Past Medical History:   Diagnosis Date    Hypertension         Interim hx:  Medication changes last visit: Increase sertraline to 150 mg  Anxiety: consistent with initial visit  Depression: pt denied     Denies suicidal/homicidal ideations.  Denies hopelessness/worthlessness.    Denies auditory/visual hallucinations      Alcohol: Infrequent use  Drug: Pt denies  Tobacco: Pt denies      Review of Systems   PSYCHIATRIC: Pertinent items are noted in the narrative.        CONSTITUTIONAL: weight stable    Past Medical, Family and Social History: The patient's  "past medical, family and social history have been reviewed and updated as appropriate within the electronic medical record. See encounter notes.     Current Psychiatric Medication:  sertraline 150 mg     Compliance: yes      Side effects: hyperhidrosis     Risk Parameters:  Patient reports no suicidal ideation  Patient reports no homicidal ideation  Patient reports no self-injurious behavior  Patient reports no violent behavior     Exam (detailed: at least 9 elements; comprehensive: all 15 elements)   Constitutional  Vitals:  Most recent vital signs, dated less than 90 days prior to this appointment, were reviewed. BP: ()/()   Arterial Line BP: ()/()       General:  unremarkable, age appropriate, casual attire, good eye contact, good rapport       Musculoskeletal  Muscle Strength/Tone:  no flaccidity, no tremor    Gait & Station:  normal      Psychiatric                       Speech:  normal tone, normal rate, rhythm, and volume   Mood & Affect:   anxious         Thought Process:   Goal directed; Linear    Associations:   intact   Thought Content:   No SI/HI, delusions, or paranoia, no AV/VH   Insight & Judgement:   Good, adequate to circumstances   Orientation:   grossly intact; alert and oriented x 4    Memory:  intact for content of interview    Language:  grossly intact, can repeat    Attention Span  : Grossly intact for content of interview   Fund of Knowledge:   intact and appropriate to age and level of education        Assessment and Diagnosis   Status/Progress: Based on the examination today, the patient's problem(s) is/are adequately controlled.  New problems have not been presented today. Co-morbidities are not complicating management of the primary condition. There are no active rule-out diagnoses for this patient at this time.      Impression: Pt continues to experience anxiety and marital stress. Continues to be concerned about "OCD"; however, descriptions sound more like [facility]. Will increase dose of " sertraline to 200 mg and encouraged pt to continue in therapy.     Diagnosis:   1) Anxiety Disorder  2) R/O Obsessive Compulsive Personality Disorder  3) Marital Conflict  Intervention/Counseling/Treatment Plan   Medication Management:      1. Increase Sertraline to 200 mg     2. Call to report any worsening of symptoms or problems with the medication. Pt instructed to go to ER with thoughts of harming self, others     3. Cont in therapy with Coleman Maravilla LPC    Psychotherapy: none  Target symptoms: anxiety  Why chosen therapy is appropriate versus another modality: CBT used; relevant to diagnosis, patient responds to this modality  Outcome monitoring methods: self-report, observation  Therapeutic intervention type: Exercise, ERP psychoeducation  Topics discussed/themes: building skills sets for symptom management, symptom recognition, nutrition, exercise  The patient's response to the intervention is accepting  Patient's response to treatment is: good.   The patient's progress toward treatment goals: improving     Return to clinic: 4-6 weeks    -Cognitive-Behavioral/Supportive therapy and psychoeducation provided  -R/B/SE's of medications discussed with the pt who expresses understanding and chooses to take medications as prescribed.   -Pt instructed to call clinic, 911 or go to nearest emergency room if sxs worsen or pt is in   crisis. The pt expresses understanding.    Marco Burgess, PhD, MP    Visit today included increased complexity associated with the care of the episodic problem anxiety addressed and managing the longitudinal care of the patient due to the serious and/or complex managed problem(s) anxiety.      Antidepressant/Antianxiety Medication Initiation:  Patient informed of risks, benefits, and potential side effects of medication and accepts informed consent.  Common side effects include nausea, fatigue, headache, insomnia., Specifically discussed the possibility of new or worsening suicidal  thoughts/depression.  Patient instructed to stop the medication immediately and seek urgent treatment if this occurs. Patient instructed not to abruptly discontinue medication without physician guidance except in cases of sudden onset or worsening of SI.

## 2024-11-08 ENCOUNTER — CLINICAL SUPPORT (OUTPATIENT)
Dept: PSYCHIATRY | Facility: CLINIC | Age: 29
End: 2024-11-08
Payer: COMMERCIAL

## 2024-11-08 DIAGNOSIS — Z63.0 MARITAL CONFLICT: ICD-10-CM

## 2024-11-08 DIAGNOSIS — F60.5 OBSESSIVE COMPULSIVE PERSONALITY DISORDER: Primary | ICD-10-CM

## 2024-11-08 PROCEDURE — 99999 PR PBB SHADOW E&M-EST. PATIENT-LVL I: CPT | Mod: PBBFAC,,, | Performed by: COUNSELOR

## 2024-11-08 SDOH — SOCIAL DETERMINANTS OF HEALTH (SDOH): PROBLEMS IN RELATIONSHIP WITH SPOUSE OR PARTNER: Z63.0

## 2024-11-08 NOTE — PROGRESS NOTES
Individual Psychotherapy [facility]  11/8/24    Site/Location:  [facility]    Visit Type: 45 min outpt individual psychotherapy    Participants: Ct and this clinician.    Therapeutic Intervention: Met with patient Outpatient - Interactive psychotherapy 45 min - CPT code 93050    Chief complaint/reason for encounter: Depression / Anxiety    Interval history and content of current session:   Client reported that he has had increased marital strife since the last session.  Client reported that in his wife and wife's friend had planned to go to the [employer].  His wife instructed him not to drink more than 2 beers.  Client instead drank heavily in his wife was upset about his behavior.  He reported that he ended up leaving them at the concert as he drove home alone.  He reported that his wife made him separate physically from the home and he slept away from home for a few nights.  He was now living at home but under the conditions that they will seek marital counseling to improve their relationship.  Discussed client's behavior.  He reported that he blamed the excessive drinking on taking an SSRI which influenced his drinking.  Discussed with client about taking responsibility for his own behaviors.  Client reported while he was communicating with his wife she questioned his  lack of emotional response.  Client reported that  although he felt somewhat sad he did not feel depressed or need for tearfulness.  Discussed his  lack of emotions and emotional distance from his wife.  Client identified having lack of emotional response in the body as well.  Client requested a referral for marriage and family therapy.  This clinician will send the client a referral for him in his wife to make the appointment.  Today client denied any current or recent SI/ HI.    Treatment plan:  Target symptoms: depression/anxiety  Why chosen therapy is appropriate versus another modality: Relevant to diagnosis  Outcome monitoring  methods: self-report. CSSRS.   Therapeutic intervention type: supportive psychotherapy. IFS therapy, memory reconsolidation.     Risk parameters:  Patient reports no suicidal ideation  Patient reports no homicidal ideation  Patient reports no self-injurious behavior  Patient reports no violent behavior    Verbal deficits: None    Patient's response to intervention:  The patient's response to intervention is accepting.    Progress toward goals and other mental status changes:  The patient's progress toward goals is good.    Diagnosis:   Obsessive compulsive personality disorder  Marital conflict    Plan:  Individual psychotherapy weekly. Utilize IFS therapy and memory reconsolidation to raise emotional tolerance and decrease negative symptoms. Ct acknowledged plan and is agreement with the plan.    Return to clinic: 1 week    Length of Service (minutes): 45       Each patient to whom he or she provides medical services by telemedicine is: (1) informed of the relationship between the physician and patient and the respective role of any other health care provider with respect to management of the patient; and (2) notified that he or she may decline to receive medical services by telemedicine and may withdraw from such care at any time.

## 2024-11-15 ENCOUNTER — CLINICAL SUPPORT (OUTPATIENT)
Dept: PSYCHIATRY | Facility: CLINIC | Age: 29
End: 2024-11-15
Payer: COMMERCIAL

## 2024-11-15 DIAGNOSIS — F60.5 OBSESSIVE COMPULSIVE PERSONALITY DISORDER: Primary | ICD-10-CM

## 2024-11-15 DIAGNOSIS — Z63.0 MARITAL CONFLICT: ICD-10-CM

## 2024-11-15 PROCEDURE — 99999 PR PBB SHADOW E&M-EST. PATIENT-LVL I: CPT | Mod: PBBFAC,,, | Performed by: COUNSELOR

## 2024-11-15 SDOH — SOCIAL DETERMINANTS OF HEALTH (SDOH): PROBLEMS IN RELATIONSHIP WITH SPOUSE OR PARTNER: Z63.0

## 2024-11-15 NOTE — PROGRESS NOTES
Individual Psychotherapy [facility]  11/15/24    Site/Location:  [facility]    Visit Type: 45 min outpt individual psychotherapy    Participants: Ct and this clinician.    Therapeutic Intervention: Met with patient Outpatient - Interactive psychotherapy 45 min - CPT code 06287    Chief complaint/reason for encounter: Depression / Anxiety    Interval history and content of current session:   Client reported no significant change in mood since the previous session.  Client reported that he continues to have marital issues.  Client reported that 1 day he left early from work to go to a sporting goods store.  He reported that his wife asked him where he was and that she needed help getting her son picked up from   .  He reported upon arriving home she was upset with him and asked him to leave the home again and sleep away.  He reported that he slept in his car that night.  Discussed client's behaviors and thinking when going to the sporting goods store.  He reported that when his wife tract him on his phone and knew where he was he felt that he was intruded upon and angry.  Client identified that his feelings were ego  driven.  He reported that he gave up drinking at an [employer] game into prove that he was not drinking he took a Breathalyzer with him.   Client identified that he was thinking about proving a point rather than showing her more attention since he was not drinking.  Discussed the idea of client showing inappropriate behaviors towards his wife rather than assuming non behaviors are good behaviors.  Today client participated in calm breathing and grounding.  He denied any current or recent SI/ HI.    Treatment plan:  Target symptoms: depression/anxiety  Why chosen therapy is appropriate versus another modality: Relevant to diagnosis  Outcome monitoring methods: self-report. CSSRS.   Therapeutic intervention type: supportive psychotherapy. IFS therapy, memory reconsolidation.     Risk  parameters:  Patient reports no suicidal ideation  Patient reports no homicidal ideation  Patient reports no self-injurious behavior  Patient reports no violent behavior    Verbal deficits: None    Patient's response to intervention:  The patient's response to intervention is accepting.    Progress toward goals and other mental status changes:  The patient's progress toward goals is good.    Diagnosis:   Obsessive compulsive personality disorder  Marital conflict    Plan:  Individual psychotherapy weekly. Utilize IFS therapy and memory reconsolidation to raise emotional tolerance and decrease negative symptoms. Ct acknowledged plan and is agreement with the plan.    Return to clinic: 1 week    Length of Service (minutes): 45       Each patient to whom he or she provides medical services by telemedicine is: (1) informed of the relationship between the physician and patient and the respective role of any other health care provider with respect to management of the patient; and (2) notified that he or she may decline to receive medical services by telemedicine and may withdraw from such care at any time.

## 2024-11-22 ENCOUNTER — TELEPHONE (OUTPATIENT)
Dept: PSYCHIATRY | Facility: CLINIC | Age: 29
End: 2024-11-22
Payer: COMMERCIAL

## 2024-11-25 ENCOUNTER — OFFICE VISIT (OUTPATIENT)
Dept: OPHTHALMOLOGY | Facility: CLINIC | Age: 29
End: 2024-11-25
Payer: COMMERCIAL

## 2024-11-25 DIAGNOSIS — H40.013 OAG (OPEN ANGLE GLAUCOMA) SUSPECT, LOW RISK, BILATERAL: Primary | ICD-10-CM

## 2024-11-25 PROCEDURE — 1160F RVW MEDS BY RX/DR IN RCRD: CPT | Mod: CPTII,S$GLB,, | Performed by: OPHTHALMOLOGY

## 2024-11-25 PROCEDURE — 76514 ECHO EXAM OF EYE THICKNESS: CPT | Mod: S$GLB,,, | Performed by: OPHTHALMOLOGY

## 2024-11-25 PROCEDURE — 99203 OFFICE O/P NEW LOW 30 MIN: CPT | Mod: S$GLB,,, | Performed by: OPHTHALMOLOGY

## 2024-11-25 PROCEDURE — 92133 CPTRZD OPH DX IMG PST SGM ON: CPT | Mod: S$GLB,,, | Performed by: OPHTHALMOLOGY

## 2024-11-25 PROCEDURE — 1159F MED LIST DOCD IN RCRD: CPT | Mod: CPTII,S$GLB,, | Performed by: OPHTHALMOLOGY

## 2024-11-25 PROCEDURE — 99999 PR PBB SHADOW E&M-EST. PATIENT-LVL II: CPT | Mod: PBBFAC,,, | Performed by: OPHTHALMOLOGY

## 2024-11-25 NOTE — PROGRESS NOTES
HPI    Pt present for glaucoma eval, ref by vision works    Pt states not having any complaints with vision.  Denies pain/FOL/floaters  Denies family hx of glaucoma  Last edited by Laura Majano on 11/25/2024  4:00 PM.            Assessment /Plan     For exam results, see Encounter Report.    OAG (open angle glaucoma) suspect, low risk, bilateral      Neg famhx  Slightly thick pachy    ONHs mildly suspicious OD>OS  IOP normal but asymmetry OD>OS  OCT NFL normal    Low/med risk  Observe off meds    F/u 6 months, HVF, gonio, IOP

## 2024-12-06 ENCOUNTER — CLINICAL SUPPORT (OUTPATIENT)
Dept: PSYCHIATRY | Facility: CLINIC | Age: 29
End: 2024-12-06
Payer: COMMERCIAL

## 2024-12-06 DIAGNOSIS — F60.5 OBSESSIVE COMPULSIVE PERSONALITY DISORDER: Primary | ICD-10-CM

## 2024-12-06 DIAGNOSIS — Z63.0 MARITAL CONFLICT: ICD-10-CM

## 2024-12-06 PROCEDURE — 99999 PR PBB SHADOW E&M-EST. PATIENT-LVL I: CPT | Mod: PBBFAC,,, | Performed by: COUNSELOR

## 2024-12-06 SDOH — SOCIAL DETERMINANTS OF HEALTH (SDOH): PROBLEMS IN RELATIONSHIP WITH SPOUSE OR PARTNER: Z63.0

## 2024-12-06 NOTE — PROGRESS NOTES
"Individual Psychotherapy [facility]  12/6/24    Site/Location:  [facility]    Visit Type: 45 min outpt individual psychotherapy    Participants: Ct and this clinician.    Therapeutic Intervention: Met with patient Outpatient - Interactive psychotherapy 45 min - CPT code 66952    Chief complaint/reason for encounter: Depression / Anxiety    Interval history and content of current session:   Client reported no significant change in mood since the previous session.  Client reported that he has had some improvement in the amount of time he is on social media.  He reported that he took various social media apps off of his phone.  He reported that he has collaborated with his wife on 2 major  purchases, rather than  making decisions on his own without her input.  He reported that he is watching the timeliness of his OCD behaviors so he is spending more time and attention to his wife and child.  However, he reported that he still becomes "hyper focused" on gaining information on other media sites.  Discussed  how client's attitudes are congruent with his behaviors.  Client reported that he feels he is enjoying  spending more time with his family, however he still has  impulsiveness that drives his OCD behaviors.  Discussed paying attention to feelings, emotions, and body sensations as he attempts to make positive changes in his behaviors.  Today client participated in grounding and calm breathing.  He denied any current or recent SI/HI.    Treatment plan:  Target symptoms: depression/anxiety  Why chosen therapy is appropriate versus another modality: Relevant to diagnosis  Outcome monitoring methods: self-report. CSSRS.   Therapeutic intervention type: supportive psychotherapy. IFS therapy, memory reconsolidation.     Risk parameters:  Patient reports no suicidal ideation  Patient reports no homicidal ideation  Patient reports no self-injurious behavior  Patient reports no violent behavior    Verbal deficits: " None    Patient's response to intervention:  The patient's response to intervention is accepting.    Progress toward goals and other mental status changes:  The patient's progress toward goals is good.    Diagnosis:   Obsessive compulsive personality disorder  Marital conflict    Plan:  Individual psychotherapy weekly. Utilize IFS therapy and memory reconsolidation to raise emotional tolerance and decrease negative symptoms. Ct acknowledged plan and is agreement with the plan.    Return to clinic: 1 week    Length of Service (minutes): 45       Each patient to whom he or she provides medical services by telemedicine is: (1) informed of the relationship between the physician and patient and the respective role of any other health care provider with respect to management of the patient; and (2) notified that he or she may decline to receive medical services by telemedicine and may withdraw from such care at any time.

## 2024-12-11 ENCOUNTER — CLINICAL SUPPORT (OUTPATIENT)
Dept: PSYCHIATRY | Facility: CLINIC | Age: 29
End: 2024-12-11
Payer: COMMERCIAL

## 2024-12-11 DIAGNOSIS — Z63.0 MARITAL CONFLICT: ICD-10-CM

## 2024-12-11 DIAGNOSIS — F60.5 OBSESSIVE COMPULSIVE PERSONALITY DISORDER: Primary | ICD-10-CM

## 2024-12-11 PROCEDURE — 90834 PSYTX W PT 45 MINUTES: CPT | Mod: S$GLB,,, | Performed by: COUNSELOR

## 2024-12-11 PROCEDURE — 99999 PR PBB SHADOW E&M-EST. PATIENT-LVL I: CPT | Mod: PBBFAC,,, | Performed by: COUNSELOR

## 2024-12-11 SDOH — SOCIAL DETERMINANTS OF HEALTH (SDOH): PROBLEMS IN RELATIONSHIP WITH SPOUSE OR PARTNER: Z63.0

## 2024-12-11 NOTE — PROGRESS NOTES
"Individual Psychotherapy [facility]  12/11/24    Site/Location:  [facility]    Visit Type: 45 min outpt individual psychotherapy    Participants: Ct and this clinician.    Therapeutic Intervention: Met with patient Outpatient - Interactive psychotherapy 45 min - CPT code 02521    Chief complaint/reason for encounter: Depression / Anxiety    Interval history and content of current session:   Client reported no significant change in mood since the previous session.   Client reported that he continues to research and get more information on buying a Sera.  Discussed the frequency of time and effort client is putting in 2 this task.  Client identified his behaviors as compulsive and obsessive.  He reported using all his "spare time" to research the vehicle.  Client reported that his wife told him that she was concerned about his affect because from experience she understands that he is becoming obsessed in his behaviors. Reviewed information from previous session regarding client being aware of feelings, emotions and body sensations when making changes to his behaviors, specifically when  having obsessive and compulsive thoughts.   He reported that it was difficulty for him to notice his feelings, emotions and body sensations when obsessing.  He reported today that when he thinks about it that if things do not go his way he would feel upset, disappointed, and have resentment towards his wife.  Client reported that he and his wife will be seeing a couples counselor on 12 /17/24.  Discussed finding motivators and reasons for client to change behaviors.  Today client participated in calm breathing and grounding.  He denied any current or recent SI/HI.    Treatment plan:  Target symptoms: depression/anxiety  Why chosen therapy is appropriate versus another modality: Relevant to diagnosis  Outcome monitoring methods: self-report. CSSRS.   Therapeutic intervention type: supportive psychotherapy. IFS therapy, memory " reconsolidation.     Risk parameters:  Patient reports no suicidal ideation  Patient reports no homicidal ideation  Patient reports no self-injurious behavior  Patient reports no violent behavior    Verbal deficits: None    Patient's response to intervention:  The patient's response to intervention is accepting.    Progress toward goals and other mental status changes:  The patient's progress toward goals is good.    Diagnosis:   Obsessive compulsive personality disorder  Marital conflict    Plan:  Individual psychotherapy weekly. Utilize IFS therapy and memory reconsolidation to raise emotional tolerance and decrease negative symptoms. Ct acknowledged plan and is agreement with the plan.    Return to clinic: 1 week    Length of Service (minutes): 45       Each patient to whom he or she provides medical services by telemedicine is: (1) informed of the relationship between the physician and patient and the respective role of any other health care provider with respect to management of the patient; and (2) notified that he or she may decline to receive medical services by telemedicine and may withdraw from such care at any time.

## 2024-12-20 ENCOUNTER — CLINICAL SUPPORT (OUTPATIENT)
Dept: PSYCHIATRY | Facility: CLINIC | Age: 29
End: 2024-12-20
Payer: COMMERCIAL

## 2024-12-20 DIAGNOSIS — Z63.0 MARITAL CONFLICT: ICD-10-CM

## 2024-12-20 DIAGNOSIS — F60.5 OBSESSIVE COMPULSIVE PERSONALITY DISORDER: Primary | ICD-10-CM

## 2024-12-20 PROCEDURE — 99999 PR PBB SHADOW E&M-EST. PATIENT-LVL I: CPT | Mod: PBBFAC,,, | Performed by: COUNSELOR

## 2024-12-20 SDOH — SOCIAL DETERMINANTS OF HEALTH (SDOH): PROBLEMS IN RELATIONSHIP WITH SPOUSE OR PARTNER: Z63.0

## 2024-12-20 NOTE — PROGRESS NOTES
"Individual Psychotherapy [facility]  12/20/24    Site/Location:  [facility]    Visit Type: 45 min outpt individual psychotherapy    Participants: Ct and this clinician.    Therapeutic Intervention: Met with patient Outpatient - Interactive psychotherapy 45 min - CPT code 90089    Chief complaint/reason for encounter: Depression / Anxiety    Interval history and content of current session:   Client reported no significant change in mood since the previous session. Client reported that his wife asked him to plan a romantic weekend so that he could "prove to her that he cares".  Client reported that he made plans in [place] but circumstances occurred where they change plans and they went to the beach in [place] instead.  Client reported that they took their son and because they were in [place] he was able to test drive Sera was while there.  Client went on to explain  how he was able to learn more about the car and convince his wife that he is making the correct purchase.  Client did not mention anything about time spent with his wife or showing  positive feeling and emotion towards her. Discussed client's difficulty with showing feeling and emotion.  Today client participated in calm breathing and grounding.  He denied any current or recent SI/HI.    Treatment plan:  Target symptoms: depression/anxiety  Why chosen therapy is appropriate versus another modality: Relevant to diagnosis  Outcome monitoring methods: self-report. CSSRS.   Therapeutic intervention type: supportive psychotherapy. IFS therapy, memory reconsolidation.     Risk parameters:  Patient reports no suicidal ideation  Patient reports no homicidal ideation  Patient reports no self-injurious behavior  Patient reports no violent behavior    Verbal deficits: None    Patient's response to intervention:  The patient's response to intervention is accepting.    Progress toward goals and other mental status changes:  The patient's progress toward " goals is good.    Diagnosis:   Obsessive compulsive personality disorder  Marital conflict       Plan:  Individual psychotherapy weekly. Utilize IFS therapy and memory reconsolidation to raise emotional tolerance and decrease negative symptoms. Ct acknowledged plan and is agreement with the plan.    Return to clinic: 1 week    Length of Service (minutes): 45       Each patient to whom he or she provides medical services by telemedicine is: (1) informed of the relationship between the physician and patient and the respective role of any other health care provider with respect to management of the patient; and (2) notified that he or she may decline to receive medical services by telemedicine and may withdraw from such care at any time.

## 2024-12-27 RX ORDER — SERTRALINE HYDROCHLORIDE 100 MG/1
TABLET, FILM COATED ORAL
Qty: 60 TABLET | Refills: 1 | Status: SHIPPED | OUTPATIENT
Start: 2024-12-27

## 2025-01-03 ENCOUNTER — CLINICAL SUPPORT (OUTPATIENT)
Dept: PSYCHIATRY | Facility: CLINIC | Age: 30
End: 2025-01-03
Payer: COMMERCIAL

## 2025-01-03 DIAGNOSIS — F60.5 OBSESSIVE COMPULSIVE PERSONALITY DISORDER: Primary | ICD-10-CM

## 2025-01-03 DIAGNOSIS — Z63.0 MARITAL CONFLICT: ICD-10-CM

## 2025-01-03 PROCEDURE — 99999 PR PBB SHADOW E&M-EST. PATIENT-LVL I: CPT | Mod: PBBFAC,,, | Performed by: COUNSELOR

## 2025-01-03 SDOH — SOCIAL DETERMINANTS OF HEALTH (SDOH): PROBLEMS IN RELATIONSHIP WITH SPOUSE OR PARTNER: Z63.0

## 2025-01-03 NOTE — PROGRESS NOTES
"Individual Psychotherapy [facility]  1/3/25    Site/Location:  [facility]    Visit Type: 45 min outpt individual psychotherapy    Participants: Ct and this clinician.    Therapeutic Intervention: Met with patient Outpatient - Interactive psychotherapy 45 min - CPT code 19460    Chief complaint/reason for encounter: Depression / Anxiety    Interval history and content of current session:   Client reported no significant change in mood since the previous session.  Client reported that he had a "good holiday" , having 2 weeks off from work.  He reported using the time to organize and clean his attic and garage.  Discussed client's behaviors in time that he spent with his wife and child during this time.  Client reported that he spent most of the time away from them.  He reinforced the idea that he wants to complete the organizing and cleaning to have more time in the new year to accomplish other things.  Discussed client's difficulty in recognizing that his behaviors are highly dissociative and distracting from his family.  Today client participated in calm breathing and  grounding.  He denied any current or recent SI/HI.    Treatment plan:  Target symptoms: depression/anxiety  Why chosen therapy is appropriate versus another modality: Relevant to diagnosis  Outcome monitoring methods: self-report. CSSRS.   Therapeutic intervention type: supportive psychotherapy. IFS therapy, memory reconsolidation.     Risk parameters:  Patient reports no suicidal ideation  Patient reports no homicidal ideation  Patient reports no self-injurious behavior  Patient reports no violent behavior    Verbal deficits: None    Patient's response to intervention:  The patient's response to intervention is accepting.    Progress toward goals and other mental status changes:  The patient's progress toward goals is good.    Diagnosis:   Obsessive compulsive personality disorder  Marital conflict    Plan:  Individual psychotherapy weekly. " Utilize IFS therapy and memory reconsolidation to raise emotional tolerance and decrease negative symptoms. Ct acknowledged plan and is agreement with the plan.    Return to clinic: 1 week    Length of Service (minutes): 45       Each patient to whom he or she provides medical services by telemedicine is: (1) informed of the relationship between the physician and patient and the respective role of any other health care provider with respect to management of the patient; and (2) notified that he or she may decline to receive medical services by telemedicine and may withdraw from such care at any time.

## 2025-01-17 ENCOUNTER — CLINICAL SUPPORT (OUTPATIENT)
Dept: PSYCHIATRY | Facility: CLINIC | Age: 30
End: 2025-01-17
Payer: COMMERCIAL

## 2025-01-17 DIAGNOSIS — Z63.0 MARITAL CONFLICT: ICD-10-CM

## 2025-01-17 DIAGNOSIS — F60.5 OBSESSIVE COMPULSIVE PERSONALITY DISORDER: Primary | ICD-10-CM

## 2025-01-17 PROCEDURE — 90834 PSYTX W PT 45 MINUTES: CPT | Mod: S$GLB,,, | Performed by: COUNSELOR

## 2025-01-17 PROCEDURE — 99999 PR PBB SHADOW E&M-EST. PATIENT-LVL I: CPT | Mod: PBBFAC,,, | Performed by: COUNSELOR

## 2025-01-17 SDOH — SOCIAL DETERMINANTS OF HEALTH (SDOH): PROBLEMS IN RELATIONSHIP WITH SPOUSE OR PARTNER: Z63.0

## 2025-01-17 NOTE — PROGRESS NOTES
Individual Psychotherapy [facility]  1/17/25    Site/Location:  [facility]    Visit Type: 45 min outpt individual psychotherapy    Participants: Ct and this clinician.    Therapeutic Intervention: Met with patient Outpatient - Interactive psychotherapy 45 min - CPT code 51147    Chief complaint/reason for encounter: Depression / Anxiety    Interval history and content of current session:   Client reported no significant change in mood since the previous session.  Discussed information from previous session regarding client noticing his maladaptive behaviors, particularly spending time away from family and involving himself in activities.  Reviewed in [facility] ED work sheet for client to take home.  Discussed client target beliefs and assumptions, designed an experiment to work on this assumption, discussed the predicted outcome, the actual outcome, and what has been learned.  Today client and clinician went through a mock scenario to understand how to fill out the work sheet.  Client will bring in complete sheet at next session.  Today client participated in calm breathing and grounding.  He denied any current or recent SI/HI.    Treatment plan:  Target symptoms: depression/anxiety  Why chosen therapy is appropriate versus another modality: Relevant to diagnosis  Outcome monitoring methods: self-report. CSSRS.   Therapeutic intervention type: supportive psychotherapy. IFS therapy, memory reconsolidation.     Risk parameters:  Patient reports no suicidal ideation  Patient reports no homicidal ideation  Patient reports no self-injurious behavior  Patient reports no violent behavior    Verbal deficits: None    Patient's response to intervention:  The patient's response to intervention is accepting.    Progress toward goals and other mental status changes:  The patient's progress toward goals is good.    Diagnosis:   Obsessive compulsive personality disorder  Marital conflict    Plan:  Individual psychotherapy weekly.  Utilize IFS therapy and memory reconsolidation to raise emotional tolerance and decrease negative symptoms. Ct acknowledged plan and is agreement with the plan.    Return to clinic: 1 week    Length of Service (minutes): 45       Each patient to whom he or she provides medical services by telemedicine is: (1) informed of the relationship between the physician and patient and the respective role of any other health care provider with respect to management of the patient; and (2) notified that he or she may decline to receive medical services by telemedicine and may withdraw from such care at any time.

## 2025-01-24 ENCOUNTER — CLINICAL SUPPORT (OUTPATIENT)
Dept: PSYCHIATRY | Facility: CLINIC | Age: 30
End: 2025-01-24
Payer: COMMERCIAL

## 2025-01-24 DIAGNOSIS — F60.5 OBSESSIVE COMPULSIVE PERSONALITY DISORDER: Primary | ICD-10-CM

## 2025-01-24 DIAGNOSIS — Z63.0 MARITAL CONFLICT: ICD-10-CM

## 2025-01-24 PROCEDURE — 90834 PSYTX W PT 45 MINUTES: CPT | Mod: S$GLB,,, | Performed by: COUNSELOR

## 2025-01-24 PROCEDURE — 99999 PR PBB SHADOW E&M-EST. PATIENT-LVL I: CPT | Mod: PBBFAC,,, | Performed by: COUNSELOR

## 2025-01-24 SDOH — SOCIAL DETERMINANTS OF HEALTH (SDOH): PROBLEMS IN RELATIONSHIP WITH SPOUSE OR PARTNER: Z63.0

## 2025-01-24 NOTE — PROGRESS NOTES
Individual Psychotherapy [facility]  1/24/25    Site/Location:  [facility]    Visit Type: 45 min outpt individual psychotherapy    Participants: Ct and this clinician.    Therapeutic Intervention: Met with patient Outpatient - Interactive psychotherapy 45 min - CPT code 80423    Chief complaint/reason for encounter: Depression / Anxiety    Interval history and content of current session:   Client reported no significant change in mood since the previous session.  Reviewed information from previous session regarding client designing experiment to observe his behaviors with [employer].  Client delayed a response to the urge to  items in his living room.  He reported that he felt internal angst. Client reported that it was tolerable.  Discussed making another experiment.  He identified observing his garage and finding areas that need to be organized.  He reported that he will refrain from organizing the garage and observe the garage the following day and note the outcome.  Client reported that occasionally has intense dreams.  He reported that these dreams began after the last time he quit smoking marijuana 3 years ago.  Discussed working with the body to reduce anxiety before preparing for bed.  Today client participated in calm breathing and grounding.  He denied any current or recent SI/HI.    Treatment plan:  Target symptoms: depression/anxiety  Why chosen therapy is appropriate versus another modality: Relevant to diagnosis  Outcome monitoring methods: self-report. CSSRS.   Therapeutic intervention type: supportive psychotherapy. IFS therapy, memory reconsolidation.     Risk parameters:  Patient reports no suicidal ideation  Patient reports no homicidal ideation  Patient reports no self-injurious behavior  Patient reports no violent behavior    Verbal deficits: None    Patient's response to intervention:  The patient's response to intervention is accepting.    Progress toward goals and other mental status  changes:  The patient's progress toward goals is good.    Diagnosis:   Obsessive compulsive personality disorder  Marital conflict       Plan:  Individual psychotherapy weekly. Utilize IFS therapy and memory reconsolidation to raise emotional tolerance and decrease negative symptoms. Ct acknowledged plan and is agreement with the plan.    Return to clinic: 1 week    Length of Service (minutes): 45       Each patient to whom he or she provides medical services by telemedicine is: (1) informed of the relationship between the physician and patient and the respective role of any other health care provider with respect to management of the patient; and (2) notified that he or she may decline to receive medical services by telemedicine and may withdraw from such care at any time.

## 2025-02-07 ENCOUNTER — OFFICE VISIT (OUTPATIENT)
Dept: FAMILY MEDICINE | Facility: CLINIC | Age: 30
End: 2025-02-07
Payer: COMMERCIAL

## 2025-02-07 ENCOUNTER — LAB VISIT (OUTPATIENT)
Dept: LAB | Facility: HOSPITAL | Age: 30
End: 2025-02-07
Payer: COMMERCIAL

## 2025-02-07 ENCOUNTER — CLINICAL SUPPORT (OUTPATIENT)
Dept: PSYCHIATRY | Facility: CLINIC | Age: 30
End: 2025-02-07
Payer: COMMERCIAL

## 2025-02-07 ENCOUNTER — PATIENT MESSAGE (OUTPATIENT)
Dept: PSYCHIATRY | Facility: CLINIC | Age: 30
End: 2025-02-07
Payer: COMMERCIAL

## 2025-02-07 VITALS
SYSTOLIC BLOOD PRESSURE: 128 MMHG | BODY MASS INDEX: 35.41 KG/M2 | WEIGHT: 267.19 LBS | DIASTOLIC BLOOD PRESSURE: 60 MMHG | HEART RATE: 99 BPM | TEMPERATURE: 99 F | OXYGEN SATURATION: 95 % | HEIGHT: 73 IN

## 2025-02-07 DIAGNOSIS — Z00.00 ANNUAL PHYSICAL EXAM: ICD-10-CM

## 2025-02-07 DIAGNOSIS — M79.642 LEFT HAND PAIN: ICD-10-CM

## 2025-02-07 DIAGNOSIS — F60.5 OBSESSIVE COMPULSIVE PERSONALITY DISORDER: ICD-10-CM

## 2025-02-07 DIAGNOSIS — F60.5 OBSESSIVE COMPULSIVE PERSONALITY DISORDER: Primary | ICD-10-CM

## 2025-02-07 DIAGNOSIS — I10 HYPERTENSION, UNSPECIFIED TYPE: ICD-10-CM

## 2025-02-07 DIAGNOSIS — E66.01 SEVERE OBESITY (BMI 35.0-39.9) WITH COMORBIDITY: ICD-10-CM

## 2025-02-07 DIAGNOSIS — Z63.0 MARITAL CONFLICT: ICD-10-CM

## 2025-02-07 DIAGNOSIS — I10 HYPERTENSION, UNSPECIFIED TYPE: Primary | ICD-10-CM

## 2025-02-07 LAB
ALBUMIN SERPL BCP-MCNC: 4.5 G/DL (ref 3.5–5.2)
ALP SERPL-CCNC: 59 U/L (ref 40–150)
ALT SERPL W/O P-5'-P-CCNC: 42 U/L (ref 10–44)
ANION GAP SERPL CALC-SCNC: 10 MMOL/L (ref 8–16)
AST SERPL-CCNC: 30 U/L (ref 10–40)
BASOPHILS # BLD AUTO: 0.05 K/UL (ref 0–0.2)
BASOPHILS NFR BLD: 0.7 % (ref 0–1.9)
BILIRUB SERPL-MCNC: 0.7 MG/DL (ref 0.1–1)
BILIRUB UR QL STRIP: NEGATIVE
BUN SERPL-MCNC: 13 MG/DL (ref 6–20)
CALCIUM SERPL-MCNC: 9.9 MG/DL (ref 8.7–10.5)
CHLORIDE SERPL-SCNC: 105 MMOL/L (ref 95–110)
CHOLEST SERPL-MCNC: 219 MG/DL (ref 120–199)
CHOLEST/HDLC SERPL: 4.5 {RATIO} (ref 2–5)
CLARITY UR REFRACT.AUTO: CLEAR
CO2 SERPL-SCNC: 24 MMOL/L (ref 23–29)
COLOR UR AUTO: YELLOW
CREAT SERPL-MCNC: 0.8 MG/DL (ref 0.5–1.4)
DIFFERENTIAL METHOD BLD: NORMAL
EOSINOPHIL # BLD AUTO: 0.1 K/UL (ref 0–0.5)
EOSINOPHIL NFR BLD: 1.5 % (ref 0–8)
ERYTHROCYTE [DISTWIDTH] IN BLOOD BY AUTOMATED COUNT: 11.9 % (ref 11.5–14.5)
EST. GFR  (NO RACE VARIABLE): >60 ML/MIN/1.73 M^2
ESTIMATED AVG GLUCOSE: 103 MG/DL (ref 68–131)
GLUCOSE SERPL-MCNC: 99 MG/DL (ref 70–110)
GLUCOSE UR QL STRIP: NEGATIVE
HBA1C MFR BLD: 5.2 % (ref 4–5.6)
HCT VFR BLD AUTO: 43.7 % (ref 40–54)
HDLC SERPL-MCNC: 49 MG/DL (ref 40–75)
HDLC SERPL: 22.4 % (ref 20–50)
HGB BLD-MCNC: 14.2 G/DL (ref 14–18)
HGB UR QL STRIP: NEGATIVE
IMM GRANULOCYTES # BLD AUTO: 0.01 K/UL (ref 0–0.04)
IMM GRANULOCYTES NFR BLD AUTO: 0.1 % (ref 0–0.5)
KETONES UR QL STRIP: NEGATIVE
LDLC SERPL CALC-MCNC: 139.8 MG/DL (ref 63–159)
LEUKOCYTE ESTERASE UR QL STRIP: NEGATIVE
LYMPHOCYTES # BLD AUTO: 3.2 K/UL (ref 1–4.8)
LYMPHOCYTES NFR BLD: 44.2 % (ref 18–48)
MCH RBC QN AUTO: 28.9 PG (ref 27–31)
MCHC RBC AUTO-ENTMCNC: 32.5 G/DL (ref 32–36)
MCV RBC AUTO: 89 FL (ref 82–98)
MONOCYTES # BLD AUTO: 0.7 K/UL (ref 0.3–1)
MONOCYTES NFR BLD: 9.7 % (ref 4–15)
NEUTROPHILS # BLD AUTO: 3.1 K/UL (ref 1.8–7.7)
NEUTROPHILS NFR BLD: 43.8 % (ref 38–73)
NITRITE UR QL STRIP: NEGATIVE
NONHDLC SERPL-MCNC: 170 MG/DL
NRBC BLD-RTO: 0 /100 WBC
PH UR STRIP: 7 [PH] (ref 5–8)
PLATELET # BLD AUTO: 235 K/UL (ref 150–450)
PMV BLD AUTO: 10.3 FL (ref 9.2–12.9)
POTASSIUM SERPL-SCNC: 4.1 MMOL/L (ref 3.5–5.1)
PROT SERPL-MCNC: 8.1 G/DL (ref 6–8.4)
PROT UR QL STRIP: NEGATIVE
RBC # BLD AUTO: 4.92 M/UL (ref 4.6–6.2)
SODIUM SERPL-SCNC: 139 MMOL/L (ref 136–145)
SP GR UR STRIP: 1.02 (ref 1–1.03)
TRIGL SERPL-MCNC: 151 MG/DL (ref 30–150)
TSH SERPL DL<=0.005 MIU/L-ACNC: 0.8 UIU/ML (ref 0.4–4)
URN SPEC COLLECT METH UR: NORMAL
WBC # BLD AUTO: 7.19 K/UL (ref 3.9–12.7)

## 2025-02-07 PROCEDURE — 1159F MED LIST DOCD IN RCRD: CPT | Mod: CPTII,S$GLB,,

## 2025-02-07 PROCEDURE — 3074F SYST BP LT 130 MM HG: CPT | Mod: CPTII,S$GLB,,

## 2025-02-07 PROCEDURE — 36415 COLL VENOUS BLD VENIPUNCTURE: CPT | Mod: PO

## 2025-02-07 PROCEDURE — 83036 HEMOGLOBIN GLYCOSYLATED A1C: CPT

## 2025-02-07 PROCEDURE — 90834 PSYTX W PT 45 MINUTES: CPT | Mod: S$GLB,,, | Performed by: COUNSELOR

## 2025-02-07 PROCEDURE — 3078F DIAST BP <80 MM HG: CPT | Mod: CPTII,S$GLB,,

## 2025-02-07 PROCEDURE — 99999 PR PBB SHADOW E&M-EST. PATIENT-LVL I: CPT | Mod: PBBFAC,,, | Performed by: COUNSELOR

## 2025-02-07 PROCEDURE — 85025 COMPLETE CBC W/AUTO DIFF WBC: CPT

## 2025-02-07 PROCEDURE — 3008F BODY MASS INDEX DOCD: CPT | Mod: CPTII,S$GLB,,

## 2025-02-07 PROCEDURE — 99999 PR PBB SHADOW E&M-EST. PATIENT-LVL III: CPT | Mod: PBBFAC,,,

## 2025-02-07 PROCEDURE — 99395 PREV VISIT EST AGE 18-39: CPT | Mod: S$GLB,,,

## 2025-02-07 PROCEDURE — 1160F RVW MEDS BY RX/DR IN RCRD: CPT | Mod: CPTII,S$GLB,,

## 2025-02-07 PROCEDURE — 81003 URINALYSIS AUTO W/O SCOPE: CPT

## 2025-02-07 PROCEDURE — 84443 ASSAY THYROID STIM HORMONE: CPT

## 2025-02-07 PROCEDURE — 80053 COMPREHEN METABOLIC PANEL: CPT

## 2025-02-07 PROCEDURE — 80061 LIPID PANEL: CPT

## 2025-02-07 RX ORDER — TIRZEPATIDE 2.5 MG/.5ML
2.5 INJECTION, SOLUTION SUBCUTANEOUS
Qty: 2 ML | Refills: 2 | Status: SHIPPED | OUTPATIENT
Start: 2025-02-07 | End: 2025-02-11

## 2025-02-07 SDOH — SOCIAL DETERMINANTS OF HEALTH (SDOH): PROBLEMS IN RELATIONSHIP WITH SPOUSE OR PARTNER: Z63.0

## 2025-02-07 NOTE — PROGRESS NOTES
Subjective:       Patient ID: Tino Fu is a 29 y.o. male.    Chief Complaint: annual       Tino Fu is a 29 y.o. male with hx HTN who presents to clinic for annual exam. He reports some frustration about inability to lose weight despite healthy diet and exercise. He states he is interested in medication to help with weight loss. He reports he is concerned about his heart and gut health, and he feels losing weight will help these. He is following with psychiatry for management of OCPD and currently taking Zoloft. He states he occasionally feels the Zoloft is not helping as it once did. He also reports left palm pain. He states he is unsure if he hit the hand on something. Denies numbness/ tingling. He reports pain in the hand when he uses it to get up from seated position.         Review of Systems   Constitutional:  Negative for fatigue.   Respiratory:  Negative for shortness of breath.    Cardiovascular:  Negative for chest pain.   Gastrointestinal:  Negative for abdominal pain.   Musculoskeletal:         Left hand pain         Past Medical History:   Diagnosis Date    Hypertension        Review of patient's allergies indicates:  No Known Allergies      Current Outpatient Medications:     multivitamin capsule, Take by mouth once daily., Disp: , Rfl:     omega-3 fatty acids/fish oil (FISH OIL-OMEGA-3 FATTY ACIDS) 300-1,000 mg capsule, Take by mouth once daily., Disp: , Rfl:     sertraline (ZOLOFT) 100 MG tablet, TAKE 2 TABLETS(200 MG) BY MOUTH DAILY, Disp: 60 tablet, Rfl: 1    tirzepatide, weight loss, (ZEPBOUND) 2.5 mg/0.5 mL PnIj, Inject 2.5 mg into the skin every 7 days., Disp: 2 mL, Rfl: 2    Objective:        Physical Exam  Vitals reviewed.   Constitutional:       General: He is not in acute distress.     Appearance: Normal appearance.   HENT:      Head: Normocephalic and atraumatic.   Eyes:      Conjunctiva/sclera: Conjunctivae normal.   Cardiovascular:      Rate and Rhythm: Normal rate  "and regular rhythm.      Heart sounds: Normal heart sounds.   Pulmonary:      Effort: Pulmonary effort is normal.      Breath sounds: Normal breath sounds.   Abdominal:      General: Bowel sounds are normal.      Palpations: Abdomen is soft.      Tenderness: There is no abdominal tenderness. There is no guarding or rebound.   Musculoskeletal:         General: Normal range of motion.      Left hand: Tenderness present. Normal pulse.        Hands:       Cervical back: Normal range of motion.   Skin:     General: Skin is warm and dry.   Neurological:      Mental Status: He is alert and oriented to person, place, and time.   Psychiatric:         Behavior: Behavior normal.           Visit Vitals  /60 (BP Location: Right arm, Patient Position: Sitting)   Pulse 99   Temp 98.7 °F (37.1 °C) (Oral)   Ht 6' 1" (1.854 m)   Wt 121.2 kg (267 lb 3.2 oz)   SpO2 95%   BMI 35.25 kg/m²      Assessment:         1. Hypertension, unspecified type    2. Annual physical exam    3. Severe obesity (BMI 35.0-39.9) with comorbidity    4. Left hand pain    5. Obsessive compulsive personality disorder        Plan:         Diagnoses and all orders for this visit:    Hypertension, unspecified type  -     tirzepatide, weight loss, (ZEPBOUND) 2.5 mg/0.5 mL PnIj; Inject 2.5 mg into the skin every 7 days.  -     CBC Auto Differential; Future  -     Comprehensive Metabolic Panel; Future  -     Discussed most common side effects from GLP-1    Annual physical exam  -     CBC Auto Differential; Future  -     Comprehensive Metabolic Panel; Future  -     Lipid Panel; Future  -     TSH; Future  -     HEMOGLOBIN A1C; Future  -     Urinalysis; Future    Severe obesity (BMI 35.0-39.9) with comorbidity  -     tirzepatide, weight loss, (ZEPBOUND) 2.5 mg/0.5 mL PnIj; Inject 2.5 mg into the skin every 7 days.    Left hand pain  -     X-Ray Wrist Complete Left; Future    Obsessive compulsive personality disorder         -     On Zoloft. Following with " psychiatry.       Follow up annually or sooner if needed.        Family Medicine Physician Assistant     Future Appointments       Date Provider Specialty Appt Notes    2/7/2025  Lab .    2/7/2025 Coleman Maravilla LPC Psychiatry follow up    2/14/2025 Coleman Maravilla LPC Psychiatry follow up    4/30/2025 Jennifer Cordoba MD Dermatology skin check    5/12/2025  Ophthalmology 24-2 hvf    5/12/2025 Erick Hernandez MD Ophthalmology 6m HVF/ IOP/ cct6             All of your core healthy metrics are met.       I spent a total of 30 minutes on the day of the visit.This includes face to face time and non-face to face time preparing to see the patient (eg, review of tests), obtaining and/or reviewing separately obtained history, documenting clinical information in the electronic or other health record, independently interpreting results and communicating results to the patient/family/caregiver, or care coordinator.

## 2025-02-07 NOTE — PROGRESS NOTES
"Individual Psychotherapy LPC      Site/Location:  [facility]    Visit Type: 45 min outpt individual psychotherapy    Participants: Ct and this clinician.    Therapeutic Intervention: Met with patient Outpatient - Interactive psychotherapy 45 min - CPT code 51301    Chief complaint/reason for encounter: Depression / Anxiety    Interval history and content of current session:   Client reported no significant change in mood since the previous session.  Discussed information from previous session regarding client using the experiment of holding back from cleaning his garage and noticed his feelings, emotions, and body sensations.  Client reported that he "slipped with the experiment".  He reported that he took time to clean garage perfectly and asked his wife to participate in cleaning other parts of the home.  Client identified one positive  aspect of the experiment being he spoke to his wife about it and how he felt  increased anxiety.   Client reported that he feels the Zoloft he takes has not been effective as he has struggled with changing behaviors over the past 7 months.  Client was advised to speak with his prescriber Dr. Burgess about other options.   Today client participated in calm breathing and grounding.  He denied any current or recent SI/HI.    Treatment plan:  Target symptoms: depression/anxiety  Why chosen therapy is appropriate versus another modality: Relevant to diagnosis  Outcome monitoring methods: self-report. CSSRS.   Therapeutic intervention type: supportive psychotherapy. IFS therapy, memory reconsolidation.     Risk parameters:  Patient reports no suicidal ideation  Patient reports no homicidal ideation  Patient reports no self-injurious behavior  Patient reports no violent behavior    Verbal deficits: None    Patient's response to intervention:  The patient's response to intervention is accepting.    Progress toward goals and other mental status changes:  The patient's progress " toward goals is good.    Diagnosis:   Obsessive compulsive personality disorder  Marital conflict    Plan:  Individual psychotherapy weekly. Utilize IFS therapy and memory reconsolidation to raise emotional tolerance and decrease negative symptoms. Ct acknowledged plan and is agreement with the plan.    Return to clinic: 1 week    Length of Service (minutes): 45       Each patient to whom he or she provides medical services by telemedicine is: (1) informed of the relationship between the physician and patient and the respective role of any other health care provider with respect to management of the patient; and (2) notified that he or she may decline to receive medical services by telemedicine and may withdraw from such care at any time.

## 2025-02-10 DIAGNOSIS — E66.01 SEVERE OBESITY (BMI 35.0-39.9) WITH COMORBIDITY: ICD-10-CM

## 2025-02-10 DIAGNOSIS — I10 HYPERTENSION, UNSPECIFIED TYPE: ICD-10-CM

## 2025-02-11 RX ORDER — TIRZEPATIDE 2.5 MG/.5ML
2.5 INJECTION, SOLUTION SUBCUTANEOUS WEEKLY
Qty: 6 ML | Refills: 0 | Status: SHIPPED | OUTPATIENT
Start: 2025-02-11 | End: 2025-05-12

## 2025-02-11 NOTE — TELEPHONE ENCOUNTER
No care due was identified.  [facility] Embedded Care Due Messages. Reference number: [id number].   2/10/2025 6:47:39 PM CST

## 2025-02-13 ENCOUNTER — HOSPITAL ENCOUNTER (OUTPATIENT)
Dept: RADIOLOGY | Facility: HOSPITAL | Age: 30
Discharge: HOME | End: 2025-02-13
Payer: COMMERCIAL

## 2025-02-13 DIAGNOSIS — M79.642 LEFT HAND PAIN: ICD-10-CM

## 2025-02-13 PROCEDURE — 73110 X-RAY EXAM OF WRIST: CPT | Mod: 26,LT,, | Performed by: RADIOLOGY

## 2025-02-13 PROCEDURE — 73110 X-RAY EXAM OF WRIST: CPT | Mod: TC,PO,LT

## 2025-02-15 ENCOUNTER — PATIENT MESSAGE (OUTPATIENT)
Dept: FAMILY MEDICINE | Facility: CLINIC | Age: 30
End: 2025-02-15
Payer: COMMERCIAL

## 2025-02-15 DIAGNOSIS — E66.01 SEVERE OBESITY (BMI 35.0-39.9) WITH COMORBIDITY: ICD-10-CM

## 2025-02-15 DIAGNOSIS — I10 HYPERTENSION, UNSPECIFIED TYPE: ICD-10-CM

## 2025-02-18 ENCOUNTER — TELEPHONE (OUTPATIENT)
Dept: FAMILY MEDICINE | Facility: CLINIC | Age: 30
End: 2025-02-18
Payer: COMMERCIAL

## 2025-02-19 ENCOUNTER — TELEPHONE (OUTPATIENT)
Dept: FAMILY MEDICINE | Facility: CLINIC | Age: 30
End: 2025-02-19
Payer: COMMERCIAL

## 2025-03-05 ENCOUNTER — PATIENT MESSAGE (OUTPATIENT)
Dept: FAMILY MEDICINE | Facility: CLINIC | Age: 30
End: 2025-03-05
Payer: COMMERCIAL

## 2025-03-06 NOTE — TELEPHONE ENCOUNTER
Regarding the wrist pain, we could try a course of daily anti-inflammatories for the pain or we can have him see orthopedics. Please let me know how he would like to proceed.

## 2025-03-14 ENCOUNTER — CLINICAL SUPPORT (OUTPATIENT)
Dept: PSYCHIATRY | Facility: CLINIC | Age: 30
End: 2025-03-14
Payer: COMMERCIAL

## 2025-03-14 ENCOUNTER — TELEPHONE (OUTPATIENT)
Dept: FAMILY MEDICINE | Facility: CLINIC | Age: 30
End: 2025-03-14
Payer: COMMERCIAL

## 2025-03-14 DIAGNOSIS — F60.5 OBSESSIVE COMPULSIVE PERSONALITY DISORDER: Primary | ICD-10-CM

## 2025-03-14 DIAGNOSIS — Z63.0 MARITAL CONFLICT: ICD-10-CM

## 2025-03-14 PROCEDURE — 99999 PR PBB SHADOW E&M-EST. PATIENT-LVL I: CPT | Mod: PBBFAC,,, | Performed by: COUNSELOR

## 2025-03-14 SDOH — SOCIAL DETERMINANTS OF HEALTH (SDOH): PROBLEMS IN RELATIONSHIP WITH SPOUSE OR PARTNER: Z63.0

## 2025-03-14 NOTE — PROGRESS NOTES
Individual Psychotherapy [facility]  3/14/25    Site/Location:  [facility]    Visit Type: 60 min outpt individual psychotherapy    Participants: Ct and this clinician.    Therapeutic Intervention: Met with patient Outpatient - Interactive psychotherapy 60 min - CPT code 60712    Chief complaint/reason for encounter: Depression / Anxiety    Interval history and content of current session: Client reported having an increase in marital issues since the previous session.  He reported that at work he found marijuana that an employee had left at work.  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  Instead he reported that he used it and left the empty container in his garage.  Subsequently his wife smell the marijuana residue and questioned him.  He reported that at 1st he lied to his wife and then told her the truth.  He was told by his wife to leave the home.  He slept in his car and at work.  Discussed client's feelings and emotions  while this was going on.  He reported having emotion of sadness and crying thinking about not having a relationship with his son, however he did not feel the same emotions and feelings about his wife.  He also reported during this time of sleeping outside of the home he stopped taking his Zoloft.  He reported that not taking it made him feel some more emotion.  Discussed with client about informing his prescriber Dr. Auguste about this situation.  Client disclosed today that he  has had bizarre thoughts for many years.  He reported having thoughts about people he knows dying or thoughts about pursuing women.  He reported that these bizarre thoughts are embarrassing and why he is not expose them to this counselor in the past.  He reported that he feels exposing these thoughts and ideas withdraw people even further away from him.  Discussed with client working on the thoughts to lower intensity of negative emotion.  Today client participated in calm breathing  and grounding.  He denied any current or recent SI/HI.    Treatment plan:  Target symptoms: depression/anxiety  Why chosen therapy is appropriate versus another modality: Relevant to diagnosis  Outcome monitoring methods: self-report. CSSRS.   Therapeutic intervention type: supportive psychotherapy. IFS therapy, memory reconsolidation.     Risk parameters:  Patient reports no suicidal ideation  Patient reports no homicidal ideation  Patient reports no self-injurious behavior  Patient reports no violent behavior    Verbal deficits: None    Patient's response to intervention:  The patient's response to intervention is accepting.    Progress toward goals and other mental status changes:  The patient's progress toward goals is good.    Diagnosis:   Obsessive compulsive personality disorder  Marital conflict    Plan:  Individual psychotherapy weekly. Utilize IFS therapy and memory reconsolidation to raise emotional tolerance and decrease negative symptoms. Ct acknowledged plan and is agreement with the plan.    Return to clinic: 1 week    Length of Service (minutes): 65      Each patient to whom he or she provides medical services by telemedicine is: (1) informed of the relationship between the physician and patient and the respective role of any other health care provider with respect to management of the patient; and (2) notified that he or she may decline to receive medical services by telemedicine and may withdraw from such care at any time.

## 2025-03-19 ENCOUNTER — TELEPHONE (OUTPATIENT)
Dept: FAMILY MEDICINE | Facility: CLINIC | Age: 30
End: 2025-03-19
Payer: COMMERCIAL

## 2025-03-19 ENCOUNTER — PATIENT MESSAGE (OUTPATIENT)
Dept: PSYCHIATRY | Facility: CLINIC | Age: 30
End: 2025-03-19
Payer: COMMERCIAL

## 2025-03-21 ENCOUNTER — OFFICE VISIT (OUTPATIENT)
Dept: ORTHOPEDICS | Facility: CLINIC | Age: 30
End: 2025-03-21
Payer: COMMERCIAL

## 2025-03-21 ENCOUNTER — TELEPHONE (OUTPATIENT)
Dept: FAMILY MEDICINE | Facility: CLINIC | Age: 30
End: 2025-03-21
Payer: COMMERCIAL

## 2025-03-21 VITALS — HEIGHT: 73 IN | WEIGHT: 267.19 LBS | BODY MASS INDEX: 35.41 KG/M2

## 2025-03-21 DIAGNOSIS — M25.532 LEFT WRIST PAIN: ICD-10-CM

## 2025-03-21 DIAGNOSIS — M24.132 DEGENERATIVE TEAR OF TRIANGULAR FIBROCARTILAGE COMPLEX (TFCC) OF LEFT WRIST: Primary | ICD-10-CM

## 2025-03-21 RX ORDER — MELOXICAM 15 MG/1
15 TABLET ORAL DAILY PRN
Qty: 30 TABLET | Refills: 2 | Status: SHIPPED | OUTPATIENT
Start: 2025-03-21

## 2025-03-22 NOTE — PROGRESS NOTES
Subjective     Patient ID: Tino Fu is a 29 y.o. male.    Chief Complaint: Pain of the Left Wrist (Pt here w/ c/o L) wrist pain. Pt states pain has been ongoing for approx 2mos. Pt denies any recent fall/injury. Pt ratespain a 0 at rest and a 7w/ certain movement. Describes pain as a combination of burning and shooting. )    29-year-old male here today with complaints of left-sided wrist pain.  He has ongoing for about two months.  Can not recall any injury or fall or any other exacerbating factor for this area.  Describes the pain as a burning and shooting pain in the ulnar aspect of his wrist.  Seems to radiate over his palmar aspect at times in his bad.  Seems to become worse with pushing or lifting objects with significant weight.  He has not tried any treatment options for this issue.    Pain  Pertinent negatives include no chest pain, chills, congestion, coughing, headaches, rash or sore throat.       Review of Systems   Constitutional: Negative for chills and decreased appetite.   HENT:  Negative for congestion and sore throat.    Eyes:  Negative for blurred vision.   Cardiovascular:  Negative for chest pain, dyspnea on exertion and palpitations.   Respiratory:  Negative for cough and shortness of breath.    Skin:  Negative for rash.   Neurological:  Negative for difficulty with concentration, disturbances in coordination and headaches.   Psychiatric/Behavioral:  Negative for altered mental status, depression, hallucinations, memory loss and suicidal ideas.           Objective     General    Nursing note and vitals reviewed.  Constitutional: He is oriented to person, place, and time. He appears well-developed and well-nourished.   HENT:   Nose: Nose normal.   Eyes: EOM are normal. Pupils are equal, round, and reactive to light.   Neck: Neck supple.   Cardiovascular:  Normal rate.            Pulmonary/Chest: Effort normal.   Abdominal: Soft.   Neurological: He is alert and oriented to person, place,  and time. He has normal reflexes.   Psychiatric: He has a normal mood and affect. His behavior is normal. Judgment and thought content normal.             Right Hand/Wrist Exam   Right hand exam is normal.      Left Hand/Wrist Exam     Pain   Wrist - The patient exhibits pain of the TFCC.    Tenderness   The patient is tender to palpation of the ulnar area.     Range of Motion     Wrist   Extension:  normal   Flexion:  normal   Pronation:  normal   Supination:  normal     Tests   Phalens Sign: negative  Tinel's sign (median nerve): negative  Finkelstein's test: negative    Atrophy  Thenar:  Negative  Hypothenar:  negative    Other     Sensory Exam  Median Distribution: normal  Ulnar Distribution: normal  Radial Distribution: normal      Left Elbow Exam     Tests   Tinel's sign (cubital tunnel): negative        Muscle Strength   Left Upper Extremity  Wrist extension: 5/5   Wrist flexion: 5/5   :  5/5     Vascular Exam       Capillary Refill  Left Hand: normal capillary refill          Physical Exam  Vitals and nursing note reviewed.   Constitutional:       Appearance: He is well-developed and well-nourished.   HENT:      Nose: Nose normal.   Eyes:      Extraocular Movements: EOM normal.      Pupils: Pupils are equal, round, and reactive to light.   Cardiovascular:      Rate and Rhythm: Normal rate.   Pulmonary:      Effort: Pulmonary effort is normal.   Abdominal:      Palpations: Abdomen is soft.   Musculoskeletal:      Right wrist: Normal.      Left hand: Normal sensation of the ulnar distribution, median distribution and radial distribution. Normal capillary refill.      Cervical back: Normal range of motion and neck supple.   Neurological:      Mental Status: He is alert and oriented to person, place, and time.      Deep Tendon Reflexes: Reflexes are normal and symmetric.   Psychiatric:         Mood and Affect: Mood and affect normal.         Behavior: Behavior normal.         Thought Content: Thought  content normal.         Judgment: Judgment normal.        X-ray images that were taken by his PCP reveal an unremarkable plain film of the wrist.     Assessment and Plan     Encounter Diagnoses   Name Primary?    Left wrist pain     Degenerative tear of triangular fibrocartilage complex (TFCC) of left wrist Yes         Tino was seen today for pain.    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    Based on exam and believe he is having some pathology secondary to some small tears in the TFCC.  Going to give him a Titan wrist brace for additional support and prescribe meloxicam to take for pain as needed.

## 2025-03-24 ENCOUNTER — PATIENT MESSAGE (OUTPATIENT)
Dept: FAMILY MEDICINE | Facility: CLINIC | Age: 30
End: 2025-03-24
Payer: COMMERCIAL

## 2025-03-24 DIAGNOSIS — F41.9 ANXIETY: Primary | ICD-10-CM

## 2025-03-24 NOTE — TELEPHONE ENCOUNTER
Does he want me to order a testosterone level? If it is low, he would have to follow up with urology or endocrinology to have the medication ordered/ administered.

## 2025-03-25 NOTE — TELEPHONE ENCOUNTER
Testosterone level ordered. He should have this drawn prior to 8 AM. Please assist with scheduling.

## 2025-03-26 ENCOUNTER — OFFICE VISIT (OUTPATIENT)
Dept: PSYCHIATRY | Facility: CLINIC | Age: 30
End: 2025-03-26
Payer: COMMERCIAL

## 2025-03-26 DIAGNOSIS — Z63.0 MARITAL CONFLICT: ICD-10-CM

## 2025-03-26 DIAGNOSIS — F60.5 OBSESSIVE COMPULSIVE PERSONALITY DISORDER: Primary | ICD-10-CM

## 2025-03-26 DIAGNOSIS — F41.9 ANXIETY: ICD-10-CM

## 2025-03-26 SDOH — SOCIAL DETERMINANTS OF HEALTH (SDOH): PROBLEMS IN RELATIONSHIP WITH SPOUSE OR PARTNER: Z63.0

## 2025-03-26 NOTE — PROGRESS NOTES
The patient location is: home  The chief complaint leading to consultation is: anxiety, marital conflict    Visit type: audiovisual    35 minutes of total time spent on the encounter, which includes face to face time and non-face to face time preparing to see the patient (eg, review of tests), Obtaining and/or reviewing separately obtained history, Documenting clinical information in the electronic or other health record, Independently interpreting results (not separately reported) and communicating results to the patient/family/caregiver, or Care coordination (not separately reported).     Each patient to whom he or she provides medical services by telemedicine is:  (1) informed of the relationship between the physician and patient and the respective role of any other health care provider with respect to management of the patient; and (2) notified that he or she may decline to receive medical services by telemedicine and may withdraw from such care at any time.    Notes:      Outpatient Psychiatry Follow-Up Visit    Clinical Status of Patient: Outpatient (Ambulatory)  03/26/2025     Chief Complaint:  presenting today for a follow-up.       Interval History and Content of Current Session:  Interim Events/Subjective Report/Content of Current Session:  follow-up appointment.    Pt is a 29 y/o male with past psychiatric hx of anxiety who presents for follow-up treatment. Pt reported that he used cannabis recently and did not initially tell his wife. Led to marital conflict. Was asked to leave the house for a short period. Is currently sleeping in the spare room. Stated that he was not able to take the medication for a few days and decided to stop altogether. Stated that this happened about a month ago. Started experiencing discontinuation symptoms. Pt reported that thi has improved and he started experiencing more emotions. Is starting to note some increase in irritability. Has been engaging in therapy consistently.      Past Psychiatric hx: Lexapro (inorgasmia), alprazolam, Vyvanse, Adderall recreationally in college, sertraline     Past Medical hx:   Past Medical History:   Diagnosis Date    Hypertension         Interim hx:  Medication changes last visit: Increase sertraline to 200 mg  Anxiety: consistent with initial visit  Depression: pt denied     Denies suicidal/homicidal ideations.  Denies hopelessness/worthlessness.    Denies auditory/visual hallucinations      Alcohol: Infrequent use  Drug: Pt denies  Tobacco: Pt denies      Review of Systems   PSYCHIATRIC: Pertinent items are noted in the narrative.        CONSTITUTIONAL: weight stable    Past Medical, Family and Social History: The patient's past medical, family and social history have been reviewed and updated as appropriate within the electronic medical record. See encounter notes.     Current Psychiatric Medication:  sertraline 200 mg     Compliance: yes      Side effects: hyperhidrosis     Risk Parameters:  Patient reports no suicidal ideation  Patient reports no homicidal ideation  Patient reports no self-injurious behavior  Patient reports no violent behavior     Exam (detailed: at least 9 elements; comprehensive: all 15 elements)   Constitutional  Vitals:  Most recent vital signs, dated less than 90 days prior to this appointment, were reviewed. BP: ()/()   Arterial Line BP: ()/()       General:  unremarkable, age appropriate, casual attire, good eye contact, good rapport       Musculoskeletal  Muscle Strength/Tone:  no flaccidity, no tremor    Gait & Station:  normal      Psychiatric                       Speech:  normal tone, normal rate, rhythm, and volume   Mood & Affect:   anxious         Thought Process:   Goal directed; Linear    Associations:   intact   Thought Content:   No SI/HI, delusions, or paranoia, no AV/VH   Insight & Judgement:   Good, adequate to circumstances   Orientation:   grossly intact; alert and oriented x 4    Memory:  intact for  content of interview    Language:  grossly intact, can repeat    Attention Span  : Grossly intact for content of interview   Fund of Knowledge:   intact and appropriate to age and level of education        Assessment and Diagnosis   Status/Progress: Based on the examination today, the patient's problem(s) is/are adequately controlled.  New problems have not been presented today. Co-morbidities are not complicating management of the primary condition. There are no active rule-out diagnoses for this patient at this time.      Impression: Pt continues to experience anxiety and marital stress. Reported that he is no longer interested in medication management and would like to focus in psychosocial interventions. CBT techniques used in session.    Diagnosis:   1) Anxiety Disorder  2) R/O Obsessive Compulsive Personality Disorder  3) Marital Conflict  Intervention/Counseling/Treatment Plan   Medication Management:      1. D/C Sertraline 200 mg     2. Call to report any worsening of symptoms or problems with the medication. Pt instructed to go to ER with thoughts of harming self, others     3. Cont in therapy with Coleman Maravilla LPC    Psychotherapy: 25 minutes  Target symptoms: anxiety, marital conflict  Why chosen therapy is appropriate versus another modality: CBT used; relevant to diagnosis, patient responds to this modality  Outcome monitoring methods: self-report, observation  Therapeutic intervention type: CBT  Topics discussed/themes: building skills sets for symptom management, symptom recognition, nutrition, exercise  The patient's response to the intervention is accepting  Patient's response to treatment is: good.   The patient's progress toward treatment goals: limited     Return to clinic: as needed    -Cognitive-Behavioral/Supportive therapy and psychoeducation provided  -R/B/SE's of medications discussed with the pt who expresses understanding and chooses to take medications as prescribed.   -Pt instructed  to call clinic, 911 or go to nearest emergency room if sxs worsen or pt is in   crisis. The pt expresses understanding.    Marco Burgess, PhD, MP    Visit today included increased complexity associated with the care of the episodic problem anxiety addressed and managing the longitudinal care of the patient due to the serious and/or complex managed problem(s) anxiety.      Antidepressant/Antianxiety Medication Initiation:  Patient informed of risks, benefits, and potential side effects of medication and accepts informed consent.  Common side effects include nausea, fatigue, headache, insomnia., Specifically discussed the possibility of new or worsening suicidal thoughts/depression.  Patient instructed to stop the medication immediately and seek urgent treatment if this occurs. Patient instructed not to abruptly discontinue medication without physician guidance except in cases of sudden onset or worsening of SI.

## 2025-03-28 ENCOUNTER — RESULTS FOLLOW-UP (OUTPATIENT)
Dept: FAMILY MEDICINE | Facility: CLINIC | Age: 30
End: 2025-03-28

## 2025-03-28 ENCOUNTER — CLINICAL SUPPORT (OUTPATIENT)
Dept: PSYCHIATRY | Facility: CLINIC | Age: 30
End: 2025-03-28
Payer: COMMERCIAL

## 2025-03-28 ENCOUNTER — LAB VISIT (OUTPATIENT)
Dept: LAB | Facility: HOSPITAL | Age: 30
End: 2025-03-28
Payer: COMMERCIAL

## 2025-03-28 ENCOUNTER — PATIENT MESSAGE (OUTPATIENT)
Dept: UROLOGY | Facility: CLINIC | Age: 30
End: 2025-03-28
Payer: COMMERCIAL

## 2025-03-28 DIAGNOSIS — F41.9 ANXIETY: ICD-10-CM

## 2025-03-28 DIAGNOSIS — Z63.0 MARITAL CONFLICT: ICD-10-CM

## 2025-03-28 DIAGNOSIS — F60.5 OBSESSIVE COMPULSIVE PERSONALITY DISORDER: Primary | ICD-10-CM

## 2025-03-28 LAB — TESTOST SERPL-MCNC: 248 NG/DL (ref 304–1227)

## 2025-03-28 PROCEDURE — 84403 ASSAY OF TOTAL TESTOSTERONE: CPT

## 2025-03-28 PROCEDURE — 99999 PR PBB SHADOW E&M-EST. PATIENT-LVL I: CPT | Mod: PBBFAC,,, | Performed by: COUNSELOR

## 2025-03-28 PROCEDURE — 36415 COLL VENOUS BLD VENIPUNCTURE: CPT

## 2025-03-28 SDOH — SOCIAL DETERMINANTS OF HEALTH (SDOH): PROBLEMS IN RELATIONSHIP WITH SPOUSE OR PARTNER: Z63.0

## 2025-03-28 NOTE — PROGRESS NOTES
Individual Psychotherapy [facility]  3/28/25    Site/Location:  [facility]    Visit Type: 60 min outpt individual psychotherapy    Participants: Ct and this clinician.    Therapeutic Intervention: Met with patient Outpatient - Interactive psychotherapy 60 min - CPT code 83070    Chief complaint/reason for encounter: Depression / Anxiety    Interval history and content of current session:   Client reported no significant change in mood since the previous session.  Discussed information from previous session regarding client's reporting of having bizarre thoughts.  In the previous session he confided that for many years he has had bizarre thoughts including being attracted to other women, becoming maimed, wondering with the details of a car crash would look like, wanting to see the worst of catastrophes on social media.  During the conversation the client identified having a true fear of death.  He reported he remembered having these thoughts as a child.  Discussed client's relationship with his wife.  Reported that she continues to talk about divorce and his inappropriate behaviors of being insincere and lying.  Client is currently sleeping in a separate.  Discussed client's current coping.  Client reported that he feels he has a better understanding behaviors, however he reported that he finds it difficult to adjust, modify, or change his behaviors.  Today client participated in calm breathing and grounding.  He denied any current recent SI/HI.    Treatment plan:  Target symptoms: depression/anxiety  Why chosen therapy is appropriate versus another modality: Relevant to diagnosis  Outcome monitoring methods: self-report. CSSRS.   Therapeutic intervention type: supportive psychotherapy. IFS therapy, memory reconsolidation.     Risk parameters:  Patient reports no suicidal ideation  Patient reports no homicidal ideation  Patient reports no self-injurious behavior  Patient reports no violent behavior    Verbal  deficits: None    Patient's response to intervention:  The patient's response to intervention is accepting.    Progress toward goals and other mental status changes:  The patient's progress toward goals is good.    Diagnosis:   Obsessive compulsive personality disorder  Marital conflict    Plan:  Individual psychotherapy weekly. Utilize IFS therapy and memory reconsolidation to raise emotional tolerance and decrease negative symptoms. Ct acknowledged plan and is agreement with the plan.    Return to clinic: 1 week    Length of Service (minutes): 57       Each patient to whom he or she provides medical services by telemedicine is: (1) informed of the relationship between the physician and patient and the respective role of any other health care provider with respect to management of the patient; and (2) notified that he or she may decline to receive medical services by telemedicine and may withdraw from such care at any time.

## 2025-04-10 ENCOUNTER — CLINICAL SUPPORT (OUTPATIENT)
Dept: PSYCHIATRY | Facility: CLINIC | Age: 30
End: 2025-04-10
Payer: COMMERCIAL

## 2025-04-10 DIAGNOSIS — Z63.0 MARITAL CONFLICT: ICD-10-CM

## 2025-04-10 DIAGNOSIS — F60.5 OBSESSIVE COMPULSIVE PERSONALITY DISORDER: Primary | ICD-10-CM

## 2025-04-10 PROCEDURE — 90837 PSYTX W PT 60 MINUTES: CPT | Mod: S$GLB,,, | Performed by: COUNSELOR

## 2025-04-10 PROCEDURE — 99999 PR PBB SHADOW E&M-EST. PATIENT-LVL I: CPT | Mod: PBBFAC,,, | Performed by: COUNSELOR

## 2025-04-10 SDOH — SOCIAL DETERMINANTS OF HEALTH (SDOH): PROBLEMS IN RELATIONSHIP WITH SPOUSE OR PARTNER: Z63.0

## 2025-04-10 NOTE — PROGRESS NOTES
Individual Psychotherapy [facility]  4/10/25    Site/Location:  [facility]    Visit Type: 60 min outpt individual psychotherapy    Participants: Ct and this clinician.    Therapeutic Intervention: Met with patient Outpatient - Interactive psychotherapy 60 min - CPT code 62881    Chief complaint/reason for encounter: Depression / Anxiety    Interval history and content of current session:   Client reported no significant change in mood since the previous session.  Client reported that his relationship with his wife is somewhat stable.  They have resumed couples counseling.  He reported they still sleep in separate beds, however they are still intimate.  He reported wanting to lose weight as he has been gaining weight rapidly over the past several weeks.  Discussed his barriers in dieting.  He reported that he 2 tracts from staying on a diet plan by doing things like researching diets, buying equipment for exercise, or similar behaviors to avoid dieting.  Discussed making clear goals.  Client identified losing 8 lb by next session and sticking to one  Diet program.  Today client participated in calm breathing and grounding.  He denied any current or recent SI/HI.    Treatment plan:  Target symptoms: depression/anxiety  Why chosen therapy is appropriate versus another modality: Relevant to diagnosis  Outcome monitoring methods: self-report. CSSRS.   Therapeutic intervention type: supportive psychotherapy. IFS therapy, memory reconsolidation.     Risk parameters:  Patient reports no suicidal ideation  Patient reports no homicidal ideation  Patient reports no self-injurious behavior  Patient reports no violent behavior    Verbal deficits: None    Patient's response to intervention:  The patient's response to intervention is accepting.    Progress toward goals and other mental status changes:  The patient's progress toward goals is good.    Diagnosis:   Obsessive compulsive personality disorder  Marital  conflict    Plan:  Individual psychotherapy weekly. Utilize IFS therapy and memory reconsolidation to raise emotional tolerance and decrease negative symptoms. Ct acknowledged plan and is agreement with the plan.    Return to clinic: 1 week    Length of Service (minutes): 65       Each patient to whom he or she provides medical services by telemedicine is: (1) informed of the relationship between the physician and patient and the respective role of any other health care provider with respect to management of the patient; and (2) notified that he or she may decline to receive medical services by telemedicine and may withdraw from such care at any time.

## 2025-04-30 ENCOUNTER — OFFICE VISIT (OUTPATIENT)
Dept: DERMATOLOGY | Facility: CLINIC | Age: 30
End: 2025-04-30
Payer: COMMERCIAL

## 2025-04-30 ENCOUNTER — PATIENT MESSAGE (OUTPATIENT)
Dept: FAMILY MEDICINE | Facility: CLINIC | Age: 30
End: 2025-04-30
Payer: COMMERCIAL

## 2025-04-30 ENCOUNTER — PATIENT MESSAGE (OUTPATIENT)
Dept: PSYCHIATRY | Facility: CLINIC | Age: 30
End: 2025-04-30
Payer: COMMERCIAL

## 2025-04-30 VITALS — HEIGHT: 73 IN | WEIGHT: 275 LBS | BODY MASS INDEX: 36.45 KG/M2

## 2025-04-30 DIAGNOSIS — D22.9 MULTIPLE BENIGN NEVI: ICD-10-CM

## 2025-04-30 DIAGNOSIS — L73.9 FOLLICULITIS: Primary | ICD-10-CM

## 2025-04-30 DIAGNOSIS — Z12.83 SKIN CANCER SCREENING: ICD-10-CM

## 2025-04-30 PROCEDURE — 1160F RVW MEDS BY RX/DR IN RCRD: CPT | Mod: CPTII,S$GLB,, | Performed by: DERMATOLOGY

## 2025-04-30 PROCEDURE — 1159F MED LIST DOCD IN RCRD: CPT | Mod: CPTII,S$GLB,, | Performed by: DERMATOLOGY

## 2025-04-30 PROCEDURE — 3008F BODY MASS INDEX DOCD: CPT | Mod: CPTII,S$GLB,, | Performed by: DERMATOLOGY

## 2025-04-30 PROCEDURE — 87070 CULTURE OTHR SPECIMN AEROBIC: CPT | Performed by: DERMATOLOGY

## 2025-04-30 PROCEDURE — 99203 OFFICE O/P NEW LOW 30 MIN: CPT | Mod: S$GLB,,, | Performed by: DERMATOLOGY

## 2025-04-30 PROCEDURE — 3044F HG A1C LEVEL LT 7.0%: CPT | Mod: CPTII,S$GLB,, | Performed by: DERMATOLOGY

## 2025-04-30 RX ORDER — CLINDAMYCIN PHOSPHATE 10 MG/G
GEL TOPICAL
Qty: 60 G | Refills: 5 | Status: SHIPPED | OUTPATIENT
Start: 2025-04-30

## 2025-04-30 NOTE — PROGRESS NOTES
Subjective:      Patient ID:  Tino Fu is a 30 y.o. male who presents for   Chief Complaint   Patient presents with    Skin Check     tbsc    Spot     LOV- 3/14/22- nevi, lentigo folliculitis    Here today for a TBSC  C/o bumps around back of neck and scalp that take a while to heal, picks at them  Sweat worsens  No medicated wash  Uses orange H&S in blue bottle  C/o moles on face    Has no hx of NMSC  Has nn fhx of MM    Current Outpatient Medications:   ·  meloxicam (MOBIC) 15 MG tablet, Take 1 tablet (15 mg total) by mouth daily as needed for Pain., Disp: 30 tablet, Rfl: 2  ·  multivitamin capsule, Take by mouth once daily., Disp: , Rfl:   ·  tirzepatide, weight loss, (ZEPBOUND) 2.5 mg/0.5 mL PnIj, Inject 2.5 mg into the skin once a week., Disp: 6 mL, Rfl: 0  ·  omega-3 fatty acids/fish oil (FISH OIL-OMEGA-3 FATTY ACIDS) 300-1,000 mg capsule, Take by mouth once daily., Disp: , Rfl:         Review of Systems   Constitutional:  Negative for fever, chills and fatigue.   Skin:  Positive for activity-related sunscreen use and wears hat. Negative for itching, rash, dry skin, daily sunscreen use and dry lips.   Hematologic/Lymphatic: Does not bruise/bleed easily.       Objective:   Physical Exam   Constitutional: He appears well-developed and well-nourished. No distress.   Neurological: He is alert and oriented to person, place, and time. He is not disoriented.   Psychiatric: He has a normal mood and affect.   Skin:   Areas Examined (abnormalities noted in diagram):   Scalp / Hair Palpated and Inspected  Head / Face Inspection Performed  Neck Inspection Performed  Chest / Axilla Inspection Performed  Abdomen Inspection Performed  Genitals / Buttocks / Groin Inspection Performed  Back Inspection Performed  RUE Inspected  LUE Inspection Performed  RLE Inspected  LLE Inspection Performed  Nails and Digits Inspection Performed                     Diagram Legend     Erythematous scaling macule/papule c/w actinic  keratosis       Vascular papule c/w angioma      Pigmented verrucoid papule/plaque c/w seborrheic keratosis      Yellow umbilicated papule c/w sebaceous hyperplasia      Irregularly shaped tan macule c/w lentigo     1-2 mm smooth white papules consistent with Milia      Movable subcutaneous cyst with punctum c/w epidermal inclusion cyst      Subcutaneous movable cyst c/w pilar cyst      Firm pink to brown papule c/w dermatofibroma      Pedunculated fleshy papule(s) c/w skin tag(s)      Evenly pigmented macule c/w junctional nevus     Mildly variegated pigmented, slightly irregular-bordered macule c/w mildly atypical nevus      Flesh colored to evenly pigmented papule c/w intradermal nevus       Pink pearly papule/plaque c/w basal cell carcinoma      Erythematous hyperkeratotic cursted plaque c/w SCC      Surgical scar with no sign of skin cancer recurrence      Open and closed comedones      Inflammatory papules and pustules      Verrucoid papule consistent consistent with wart     Erythematous eczematous patches and plaques     Dystrophic onycholytic nail with subungual debris c/w onychomycosis     Umbilicated papule    Erythematous-base heme-crusted tan verrucoid plaque consistent with inflamed seborrheic keratosis     Erythematous Silvery Scaling Plaque c/w Psoriasis     See annotation      Assessment / Plan:        Folliculitis  -     clindamycin phosphate 1% 1 % gel; AAA BID PRN follliculitis  Dispense: 60 g; Refill: 5  -     Aerobic culture  Mild today, few isolated papules and pustules  Discouraged manipulating lesions and close trimming of neck hair.  BP 10% awash in shower daily or CLn body wash  Clindagel PRN    Multiple benign nevi  Careful dermoscopy evaluation of nevi performed with none identified as needing biopsy today  Monitor for new mole or moles that are becoming bigger, darker, irritated, or developing irregular borders.     Few lesions bothersome to pt for cosmetic reasons, shave removal $150  but additional path charge, which I cannot quote    Skin cancer screening  Total body skin examination performed today including at least 12 points as noted in physical examination. No lesions suspicious for malignancy noted.             No follow-ups on file.

## 2025-05-01 ENCOUNTER — OFFICE VISIT (OUTPATIENT)
Dept: UROLOGY | Facility: CLINIC | Age: 30
End: 2025-05-01
Payer: COMMERCIAL

## 2025-05-01 DIAGNOSIS — N52.9 ERECTILE DYSFUNCTION, UNSPECIFIED ERECTILE DYSFUNCTION TYPE: Primary | ICD-10-CM

## 2025-05-01 RX ORDER — CLOMIPHENE CITRATE 50 MG/1
25 TABLET ORAL DAILY
Qty: 15 TABLET | Refills: 11 | Status: SHIPPED | OUTPATIENT
Start: 2025-05-01 | End: 2026-05-01

## 2025-05-01 NOTE — PROGRESS NOTES
The patient location is: Home  The chief complaint leading to consultation is: Low testosterone  Visit type: Virtual visit with synchronous audio and video      [facility] Established Patient/H&P:    Tino Fu is a 30 y.o. male who presents for low testosterone.    Patient with a history of right testicular pain and prostatitis diagnosed in 2016 while in college. He states at the time he was diagnosed with Ureaplasma and treated with one month of antibiotics. He states that the symptoms resolved after Abx therapy.      He now presents with intermittent right testicular discomfort over the past month that is bothersome. UA and urine culture negative with his PCP on 2/8/21 - records reviewed. Has not taken any medications for his discomfort.         Interval History     4/3/23: Here today for follow up. Scrotal ultrasound with small scrotal pearls and minimal bilateral hydroceles. States his pain has improved. He underwent lab testing with his PCP and was found have low testosterone of 192. He reports weight gain and lethargy for the past several weeks. Although he and his wife have a new baby at home.      5/1/25: Virtual visit today for follow up. Patient with low testosterone. Wishes to try Clomid. Remains with lethargy, low energy, weight gain and fatigue. Trying to have more children.     No history of STDS, no gross hematuria, fever, chills, nephrolithiasis,  trauma or history  malignancy.         Testosterone  248  3/28/25  295  5/18/23  192                  4/3/23     Urine culture  No growth        2/8/21      Past Medical History:   Diagnosis Date    Hypertension        History reviewed. No pertinent surgical history.    Review of patient's allergies indicates:  No Known Allergies    Medications Reviewed: see MAR    FOCUSED PHYSICAL EXAM:    There were no vitals filed for this visit.  There is no height or weight on file to calculate BMI.           General: Alert,  cooperative, no distress, appears stated age      LABS:    No results found for this or any previous visit (from the past 2 weeks).      Assessment/Diagnosis:    1. Erectile dysfunction, unspecified erectile dysfunction type  clomiPHENE (CLOMID) 50 mg tablet    Estradiol    CBC Auto Differential    Testosterone Panel          Plans:    - Visit today included increased complexity associated with the care of the episodic problem addressed and managing the longitudinal care of the patient due to the serious and/or complex managed problem(s) low testosterone. Discussed the risks and benefits of testosterone replacement today.  We went over different testosterone treatments. This included possible cardiac risks.    - RX for Clomid 25 mg PO daily.   - RTC in 3 months with CBC, estradiol and testosterone prior.       Total time spent with patient: 30 minutes  Each patient to whom he or she provides medical services by telemedicine is:  (1) informed of the relationship between the physician and patient and the respective role of any other health care provider with respect to management of the patient; and (2) notified that he or she may decline to receive medical services by telemedicine and may withdraw from such care at any time.  Reason for telemed visit: Patient preference    Patient verbally consented to treatment via phone, according to the clinic consent to treat policies outlined by the registrar.

## 2025-05-02 ENCOUNTER — CLINICAL SUPPORT (OUTPATIENT)
Dept: PSYCHIATRY | Facility: CLINIC | Age: 30
End: 2025-05-02
Payer: COMMERCIAL

## 2025-05-02 DIAGNOSIS — F60.5 OBSESSIVE COMPULSIVE PERSONALITY DISORDER: Primary | ICD-10-CM

## 2025-05-02 DIAGNOSIS — Z63.0 MARITAL CONFLICT: ICD-10-CM

## 2025-05-02 PROCEDURE — 99999 PR PBB SHADOW E&M-EST. PATIENT-LVL I: CPT | Mod: PBBFAC,,, | Performed by: COUNSELOR

## 2025-05-02 SDOH — SOCIAL DETERMINANTS OF HEALTH (SDOH): PROBLEMS IN RELATIONSHIP WITH SPOUSE OR PARTNER: Z63.0

## 2025-05-02 NOTE — PROGRESS NOTES
Individual Psychotherapy [facility]  5/2/25    Site/Location:  [facility]    Visit Type: 60 min outpt individual psychotherapy    Participants: Ct and this clinician.    Therapeutic Intervention: Met with patient Outpatient - Interactive psychotherapy 60 min - CPT code 50311    Chief complaint/reason for encounter: Depression / Anxiety    Interval history and content of current session: Client reported no significant change in mood since the previous session.  Client reported that he continues to participate in couples counseling with his wife.  He reported that their relationship remains about the same.  His wife voices concern for his behaviors.  He reported that she has been upset by how he communicates information, stating that he can be pretentious. Discussed client's communication style  And historically how he has communicated with others.  He identified that he can be aggressive by trying to win arguments and  topical conversations.  Discussed using more assertive behaviors by listening, creating more space, and giving his wife more time to respond and give input into the conversation.  Discussed client's urges to seek information and take over a conversation.  Today client participated in calm breathing and grounding.  He denied any current or recent SI/HI.     Treatment plan:  Target symptoms: depression/anxiety  Why chosen therapy is appropriate versus another modality: Relevant to diagnosis  Outcome monitoring methods: self-report. CSSRS.   Therapeutic intervention type: supportive psychotherapy. IFS therapy, memory reconsolidation.     Risk parameters:  Patient reports no suicidal ideation  Patient reports no homicidal ideation  Patient reports no self-injurious behavior  Patient reports no violent behavior    Verbal deficits: None    Patient's response to intervention:  The patient's response to intervention is accepting.    Progress toward goals and other mental status changes:  The patient's  progress toward goals is good.    Diagnosis:   Obsessive compulsive personality disorder  Marital conflict     Plan:  Individual psychotherapy weekly. Utilize IFS therapy and memory reconsolidation to raise emotional tolerance and decrease negative symptoms. Ct acknowledged plan and is agreement with the plan.    Return to clinic: 1 week    Length of Service (minutes): 58       Each patient to whom he or she provides medical services by telemedicine is: (1) informed of the relationship between the physician and patient and the respective role of any other health care provider with respect to management of the patient; and (2) notified that he or she may decline to receive medical services by telemedicine and may withdraw from such care at any time.

## 2025-05-03 LAB — BACTERIA SPEC AEROBE CULT: NORMAL

## 2025-05-05 ENCOUNTER — RESULTS FOLLOW-UP (OUTPATIENT)
Dept: DERMATOLOGY | Facility: CLINIC | Age: 30
End: 2025-05-05

## 2025-05-07 NOTE — PROGRESS NOTES
"The patient location is: home  The chief complaint leading to consultation is: anxiety, marital conflict    Visit type: audiovisual    30 minutes of total time spent on the encounter, which includes face to face time and non-face to face time preparing to see the patient (eg, review of tests), Obtaining and/or reviewing separately obtained history, Documenting clinical information in the electronic or other health record, Independently interpreting results (not separately reported) and communicating results to the patient/family/caregiver, or Care coordination (not separately reported).     Each patient to whom he or she provides medical services by telemedicine is:  (1) informed of the relationship between the physician and patient and the respective role of any other health care provider with respect to management of the patient; and (2) notified that he or she may decline to receive medical services by telemedicine and may withdraw from such care at any time.    Notes:      Outpatient Psychiatry Follow-Up Visit    Clinical Status of Patient: Outpatient (Ambulatory)  05/08/2025     History of Present Illness    CHIEF COMPLAINT:  Patient presents to discuss restarting medication for anxiety and OCD/OCPD symptoms, primarily at the suggestion of his wife.    HPI:  Patient reports experiencing anxiety and OCD/OCPD outbursts that occur nearly every day. He describes having a "short fuse," becoming irritated, judgmental, and displaying non-pleasurable personality traits. These symptoms often manifest as getting distracted from bigger goals, spending excessive time on tasks, or feeling compelled to fix or clean things that don't fit a particular pattern.    Patient has previously tried SSRIs, including Lexapro and Zoloft. He expresses concerns about long-term medication use and potential side effects, particularly emotional numbing. His wife, a registered nurse with psychiatric experience, believes he was doing better on " medication and has encouraged him to try again.    Patient is currently engaged in couple's counseling, individual counseling, and is working on lifestyle changes. He expresses a willingness to try medication again at a lower, maintenance dose.    Patient acknowledges occasional sad thoughts but reports finding deborah in life daily, particularly in activities with his son. However, he mentions some lethargy and energy issues, which he hopes will improve with hormone treatment. Patient denies experiencing significant depression symptoms, suicidal thoughts, or inability to find deborah in life. He also denies having a formal diagnosis of major depressive disorder.    Past Psychiatric hx: Lexapro (inorgasmia), alprazolam, Vyvanse, Adderall recreationally in college, sertraline     Past Medical hx:   Past Medical History:   Diagnosis Date    Hypertension         Interim hx:  Medication changes last visit: D/C Sertraline 200 mg     Denies suicidal/homicidal ideations.  Denies hopelessness/worthlessness.    Denies auditory/visual hallucinations      Alcohol: Infrequent use  Drug: Pt denies  Tobacco: Pt denies      Review of Systems   PSYCHIATRIC: Pertinent items are noted in the narrative.        CONSTITUTIONAL: weight stable    Past Medical, Family and Social History: The patient's past medical, family and social history have been reviewed and updated as appropriate within the electronic medical record. See encounter notes.     Current Psychiatric Medication:  none     Compliance: yes      Side effects: hyperhidrosis     Risk Parameters:  Patient reports no suicidal ideation  Patient reports no homicidal ideation  Patient reports no self-injurious behavior  Patient reports no violent behavior     Exam (detailed: at least 9 elements; comprehensive: all 15 elements)   Constitutional  Vitals:  Most recent vital signs, dated less than 90 days prior to this appointment, were reviewed. BP: ()/()   Arterial Line BP: ()/()        General:  unremarkable, age appropriate, casual attire, good eye contact, good rapport       Musculoskeletal  Muscle Strength/Tone:  no flaccidity, no tremor    Gait & Station:  normal      Psychiatric                       Speech:  normal tone, normal rate, rhythm, and volume   Mood & Affect:   anxious         Thought Process:   Goal directed; Linear    Associations:   intact   Thought Content:   No SI/HI, delusions, or paranoia, no AV/VH   Insight & Judgement:   Good, adequate to circumstances   Orientation:   grossly intact; alert and oriented x 4    Memory:  intact for content of interview    Language:  grossly intact, can repeat    Attention Span  : Grossly intact for content of interview   Fund of Knowledge:   intact and appropriate to age and level of education        Assessment and Diagnosis   Status/Progress/Impression:     Assessment & Plan    IMPRESSION:  - Anxiety symptoms and previous medication trials with Lexapro and Zoloft.  - Selected fluoxetine (Prozac) 20 mg daily due to its activating properties and long half-life, which may benefit adherence.  - Discussed various SSRI options, considering side effect profiles, potential side effects, and patient preferences.    PLAN SUMMARY:  - Start Fluoxetine (Prozac) 20 mg daily  - Virtual appointment to be scheduled by office staff  - Follow-up in 1 month to assess response to fluoxetine    Diagnosis:   1) Anxiety Disorder  2) R/O Obsessive Compulsive Personality Disorder  3) Marital Conflict  Intervention/Counseling/Treatment Plan   Medication Management:      1. Start a trial fluoxetine 20 mg     2. Call to report any worsening of symptoms or problems with the medication. Pt instructed to go to ER with thoughts of harming self, others     3. Cont in therapy with Coleman Maravilla LPC    Psychotherapy: none  Target symptoms: anxiety, marital conflict  Why chosen therapy is appropriate versus another modality: CBT used; relevant to diagnosis, patient  responds to this modality  Outcome monitoring methods: self-report, observation  Therapeutic intervention type: CBT  Topics discussed/themes: building skills sets for symptom management, symptom recognition, nutrition, exercise  The patient's response to the intervention is accepting  Patient's response to treatment is: good.   The patient's progress toward treatment goals: limited     Return to clinic: 1 month    -Cognitive-Behavioral/Supportive therapy and psychoeducation provided  -R/B/SE's of medications discussed with the pt who expresses understanding and chooses to take medications as prescribed.   -Pt instructed to call clinic, 911 or go to nearest emergency room if sxs worsen or pt is in   crisis. The pt expresses understanding.    Marco Burgess, PhD, MP    Visit today included increased complexity associated with the care of the episodic problem anxiety addressed and managing the longitudinal care of the patient due to the serious and/or complex managed problem(s) anxiety.      This note was generated with the assistance of ambient listening technology. Verbal consent was obtained by the patient and accompanying visitor(s) for the recording of patient appointment to facilitate this note. I attest to having reviewed and edited the generated note for accuracy, though some syntax or spelling errors may persist. Please contact the author of this note for any clarification.     Antidepressant/Antianxiety Medication Initiation:  Patient informed of risks, benefits, and potential side effects of medication and accepts informed consent.  Common side effects include nausea, fatigue, headache, insomnia., Specifically discussed the possibility of new or worsening suicidal thoughts/depression.  Patient instructed to stop the medication immediately and seek urgent treatment if this occurs. Patient instructed not to abruptly discontinue medication without physician guidance except in cases of sudden onset or worsening  of SI.

## 2025-05-08 ENCOUNTER — OFFICE VISIT (OUTPATIENT)
Dept: PSYCHIATRY | Facility: CLINIC | Age: 30
End: 2025-05-08
Payer: COMMERCIAL

## 2025-05-08 ENCOUNTER — TELEPHONE (OUTPATIENT)
Dept: PSYCHIATRY | Facility: CLINIC | Age: 30
End: 2025-05-08
Payer: COMMERCIAL

## 2025-05-08 DIAGNOSIS — Z63.0 MARITAL CONFLICT: ICD-10-CM

## 2025-05-08 DIAGNOSIS — F60.5 OBSESSIVE COMPULSIVE PERSONALITY DISORDER: Primary | ICD-10-CM

## 2025-05-08 DIAGNOSIS — F41.9 ANXIETY: ICD-10-CM

## 2025-05-08 PROCEDURE — G2211 COMPLEX E/M VISIT ADD ON: HCPCS | Mod: 95,,, | Performed by: PSYCHOLOGIST

## 2025-05-08 PROCEDURE — 98006 SYNCH AUDIO-VIDEO EST MOD 30: CPT | Mod: 95,,, | Performed by: PSYCHOLOGIST

## 2025-05-08 PROCEDURE — 1159F MED LIST DOCD IN RCRD: CPT | Mod: CPTII,95,, | Performed by: PSYCHOLOGIST

## 2025-05-08 PROCEDURE — 3044F HG A1C LEVEL LT 7.0%: CPT | Mod: CPTII,95,, | Performed by: PSYCHOLOGIST

## 2025-05-08 RX ORDER — FLUOXETINE HYDROCHLORIDE 20 MG/1
20 CAPSULE ORAL DAILY
Qty: 30 CAPSULE | Refills: 1 | Status: SHIPPED | OUTPATIENT
Start: 2025-05-08 | End: 2026-05-08

## 2025-05-08 SDOH — SOCIAL DETERMINANTS OF HEALTH (SDOH): PROBLEMS IN RELATIONSHIP WITH SPOUSE OR PARTNER: Z63.0

## 2025-05-08 NOTE — TELEPHONE ENCOUNTER
----- Message from Marco Burgess sent at 5/8/2025  3:59 PM CDT -----  Regarding: apt  Please contact to schedule a f/u virtual visit in 1 month

## 2025-05-23 ENCOUNTER — CLINICAL SUPPORT (OUTPATIENT)
Dept: PSYCHIATRY | Facility: CLINIC | Age: 30
End: 2025-05-23
Payer: COMMERCIAL

## 2025-05-23 DIAGNOSIS — F60.5 OBSESSIVE COMPULSIVE PERSONALITY DISORDER: Primary | ICD-10-CM

## 2025-05-23 DIAGNOSIS — Z63.0 MARITAL CONFLICT: ICD-10-CM

## 2025-05-23 PROCEDURE — 99999 PR PBB SHADOW E&M-EST. PATIENT-LVL I: CPT | Mod: PBBFAC,,, | Performed by: COUNSELOR

## 2025-05-23 SDOH — SOCIAL DETERMINANTS OF HEALTH (SDOH): PROBLEMS IN RELATIONSHIP WITH SPOUSE OR PARTNER: Z63.0

## 2025-05-23 NOTE — PROGRESS NOTES
Individual Psychotherapy LPC  5/23/25    Site/Location:  [facility]    Visit Type: 60 min outpt individual psychotherapy    Participants: Ct and this clinician.    Therapeutic Intervention: Met with patient Outpatient - Interactive psychotherapy 60 min - CPT code 09904    Chief complaint/reason for encounter: Depression / Anxiety    Interval history and content of current session:   Client reported no significant change in mood since previous session.  He reported that he and his wife went on a vacation recently.  He reported that he feels overall things went well.  Client reported that during the trip his wife was suspicious and asked if he was smoking marijuana.  Client denied this accusation and when they arrived home he performed a drug test with her present showing that he did not have drugs in his  System.  Discussed client's ruminating and issues with having bizarre or unwanted thoughts.  Client reported that he continues to have arousal when seeing attractive women, particularly when in public places like shopping.  He reported that he will navigate through the store so he can find the person he is observing.  Discussed client's obsessive thoughts and the behaviors that follow.  Discussed that client's other thoughts, for example the thoughts of his fingers being mutilated to not drive other behaviors, however his observation of women Dr. Inappropriate behaviors.  Client identified that being able to talk about these thoughts are helpful.  He reported that he feels these thoughts are so strong that they will be difficult to control.  Discussed working on a plan for these obsessive thoughts which may include an outside referral to a specialist.  Client understood and agreed.  Today client participated in calm breathing and grounding.  He denied any current or recent SI/HI.    Treatment plan:  Target symptoms: depression/anxiety  Why chosen therapy is appropriate versus another modality: Relevant  to diagnosis  Outcome monitoring methods: self-report. CSSRS.   Therapeutic intervention type: supportive psychotherapy. IFS therapy, memory reconsolidation.     Risk parameters:  Patient reports no suicidal ideation  Patient reports no homicidal ideation  Patient reports no self-injurious behavior  Patient reports no violent behavior    Verbal deficits: None    Patient's response to intervention:  The patient's response to intervention is accepting.    Progress toward goals and other mental status changes:  The patient's progress toward goals is good.    Diagnosis:   Obsessive compulsive personality disorder  Marital conflict    Plan:  Individual psychotherapy weekly. Utilize IFS therapy and memory reconsolidation to raise emotional tolerance and decrease negative symptoms. Ct acknowledged plan and is agreement with the plan.    Return to clinic: 1 week    Length of Service (minutes): 59       Each patient to whom he or she provides medical services by telemedicine is: (1) informed of the relationship between the physician and patient and the respective role of any other health care provider with respect to management of the patient; and (2) notified that he or she may decline to receive medical services by telemedicine and may withdraw from such care at any time.

## 2025-06-02 ENCOUNTER — OFFICE VISIT (OUTPATIENT)
Dept: FAMILY MEDICINE | Facility: CLINIC | Age: 30
End: 2025-06-02
Payer: COMMERCIAL

## 2025-06-02 ENCOUNTER — TELEPHONE (OUTPATIENT)
Dept: FAMILY MEDICINE | Facility: CLINIC | Age: 30
End: 2025-06-02

## 2025-06-02 DIAGNOSIS — F41.9 ANXIETY: ICD-10-CM

## 2025-06-02 DIAGNOSIS — E66.01 SEVERE OBESITY (BMI 35.0-39.9) WITH COMORBIDITY: Primary | ICD-10-CM

## 2025-06-02 DIAGNOSIS — Z86.79 HISTORY OF HYPERTENSION: ICD-10-CM

## 2025-06-02 RX ORDER — PHENTERMINE HYDROCHLORIDE 37.5 MG/1
37.5 TABLET ORAL
Qty: 30 TABLET | Refills: 2 | Status: SHIPPED | OUTPATIENT
Start: 2025-06-02 | End: 2025-08-31

## 2025-06-05 ENCOUNTER — OFFICE VISIT (OUTPATIENT)
Dept: PSYCHIATRY | Facility: CLINIC | Age: 30
End: 2025-06-05
Payer: COMMERCIAL

## 2025-06-05 DIAGNOSIS — F41.9 ANXIETY: Primary | ICD-10-CM

## 2025-06-05 DIAGNOSIS — Z63.0 MARITAL CONFLICT: ICD-10-CM

## 2025-06-05 PROCEDURE — G2211 COMPLEX E/M VISIT ADD ON: HCPCS | Mod: 95,,, | Performed by: PSYCHOLOGIST

## 2025-06-05 PROCEDURE — 3044F HG A1C LEVEL LT 7.0%: CPT | Mod: CPTII,95,, | Performed by: PSYCHOLOGIST

## 2025-06-05 PROCEDURE — 90833 PSYTX W PT W E/M 30 MIN: CPT | Mod: 95,,, | Performed by: PSYCHOLOGIST

## 2025-06-05 PROCEDURE — 1159F MED LIST DOCD IN RCRD: CPT | Mod: CPTII,95,, | Performed by: PSYCHOLOGIST

## 2025-06-05 PROCEDURE — 98006 SYNCH AUDIO-VIDEO EST MOD 30: CPT | Mod: 95,,, | Performed by: PSYCHOLOGIST

## 2025-06-05 RX ORDER — FLUOXETINE 20 MG/1
20 CAPSULE ORAL DAILY
Qty: 90 CAPSULE | Refills: 0 | Status: SHIPPED | OUTPATIENT
Start: 2025-06-05 | End: 2026-06-05

## 2025-06-05 SDOH — SOCIAL DETERMINANTS OF HEALTH (SDOH): PROBLEMS IN RELATIONSHIP WITH SPOUSE OR PARTNER: Z63.0

## 2025-06-06 ENCOUNTER — CLINICAL SUPPORT (OUTPATIENT)
Dept: PSYCHIATRY | Facility: CLINIC | Age: 30
End: 2025-06-06
Payer: COMMERCIAL

## 2025-06-06 ENCOUNTER — TELEPHONE (OUTPATIENT)
Dept: PSYCHIATRY | Facility: CLINIC | Age: 30
End: 2025-06-06
Payer: COMMERCIAL

## 2025-06-06 DIAGNOSIS — Z63.0 MARITAL CONFLICT: ICD-10-CM

## 2025-06-06 DIAGNOSIS — F60.5 OBSESSIVE COMPULSIVE PERSONALITY DISORDER: Primary | ICD-10-CM

## 2025-06-06 PROCEDURE — 99999 PR PBB SHADOW E&M-EST. PATIENT-LVL I: CPT | Mod: PBBFAC,,, | Performed by: COUNSELOR

## 2025-06-06 SDOH — SOCIAL DETERMINANTS OF HEALTH (SDOH): PROBLEMS IN RELATIONSHIP WITH SPOUSE OR PARTNER: Z63.0

## 2025-06-18 DIAGNOSIS — N52.9 ERECTILE DYSFUNCTION, UNSPECIFIED ERECTILE DYSFUNCTION TYPE: ICD-10-CM

## 2025-06-26 RX ORDER — CLOMIPHENE CITRATE 50 MG/1
25 TABLET ORAL DAILY
Qty: 45 TABLET | Refills: 3 | Status: SHIPPED | OUTPATIENT
Start: 2025-06-26 | End: 2026-06-26

## 2025-07-03 RX ORDER — MELOXICAM 15 MG/1
15 TABLET ORAL DAILY PRN
Qty: 30 TABLET | Refills: 2 | Status: SHIPPED | OUTPATIENT
Start: 2025-07-03

## 2025-08-04 ENCOUNTER — OFFICE VISIT (OUTPATIENT)
Dept: OPHTHALMOLOGY | Facility: CLINIC | Age: 30
End: 2025-08-04
Payer: COMMERCIAL

## 2025-08-04 ENCOUNTER — CLINICAL SUPPORT (OUTPATIENT)
Dept: OPHTHALMOLOGY | Facility: CLINIC | Age: 30
End: 2025-08-04
Payer: COMMERCIAL

## 2025-08-04 DIAGNOSIS — H40.013 OAG (OPEN ANGLE GLAUCOMA) SUSPECT, LOW RISK, BILATERAL: Primary | ICD-10-CM

## 2025-08-04 DIAGNOSIS — H40.013 OAG (OPEN ANGLE GLAUCOMA) SUSPECT, LOW RISK, BILATERAL: ICD-10-CM

## 2025-08-04 PROCEDURE — 1160F RVW MEDS BY RX/DR IN RCRD: CPT | Mod: CPTII,S$GLB,, | Performed by: OPHTHALMOLOGY

## 2025-08-04 PROCEDURE — 3044F HG A1C LEVEL LT 7.0%: CPT | Mod: CPTII,S$GLB,, | Performed by: OPHTHALMOLOGY

## 2025-08-04 PROCEDURE — 99213 OFFICE O/P EST LOW 20 MIN: CPT | Mod: S$GLB,,, | Performed by: OPHTHALMOLOGY

## 2025-08-04 PROCEDURE — 99999 PR PBB SHADOW E&M-EST. PATIENT-LVL II: CPT | Mod: PBBFAC,,, | Performed by: OPHTHALMOLOGY

## 2025-08-04 PROCEDURE — 1159F MED LIST DOCD IN RCRD: CPT | Mod: CPTII,S$GLB,, | Performed by: OPHTHALMOLOGY

## 2025-08-04 PROCEDURE — 92020 GONIOSCOPY: CPT | Mod: S$GLB,,, | Performed by: OPHTHALMOLOGY

## 2025-08-04 RX ORDER — AMOXICILLIN AND CLAVULANATE POTASSIUM 875; 125 MG/1; MG/1
1 TABLET, FILM COATED ORAL 2 TIMES DAILY
COMMUNITY
Start: 2025-07-09

## 2025-08-04 RX ORDER — CHLORHEXIDINE GLUCONATE ORAL RINSE 1.2 MG/ML
SOLUTION DENTAL
COMMUNITY
Start: 2025-07-09

## 2025-08-04 NOTE — PROGRESS NOTES
HPI    DLS: 11/25/2024 pt present for 6mo ref HVF/gonio/IOP    Pt states not having any changes with vision since last exam    Denies pain/FOL/floaters    Last edited by Laura Majano on 8/4/2025  3:33 PM.            Assessment /Plan     For exam results, see Encounter Report.    OAG (open angle glaucoma) suspect, low risk, bilateral      Neg famhx  Slightly thick pachy    ONHs mildly suspicious OD>OS  IOP normal but asymmetry OD>OS  OCT NFL normal  HVF normal    Low risk  Observe off meds    F/u 1 year, routine with OCT NFL                       
No

## 2025-08-07 ENCOUNTER — OFFICE VISIT (OUTPATIENT)
Dept: UROLOGY | Facility: CLINIC | Age: 30
End: 2025-08-07
Payer: COMMERCIAL

## 2025-08-07 DIAGNOSIS — N52.9 ERECTILE DYSFUNCTION, UNSPECIFIED ERECTILE DYSFUNCTION TYPE: ICD-10-CM

## 2025-08-07 DIAGNOSIS — R79.89 LOW TESTOSTERONE: Primary | ICD-10-CM

## 2025-08-07 NOTE — PROGRESS NOTES
The patient location is: [place]  The chief complaint leading to consultation is: Low testosterone    Visit type: audiovisual      [facility] Established Patient/H&P:    Tino Fu is a 30 y.o. male who presents for follow up for low testosterone.     Patient with a history of right testicular pain and prostatitis diagnosed in 2016 while in college. He states at the time he was diagnosed with Ureaplasma and treated with one month of antibiotics. He states that the symptoms resolved after Abx therapy.      He now presents with intermittent right testicular discomfort over the past month that is bothersome. UA and urine culture negative with his PCP on 2/8/21 - records reviewed. Has not taken any medications for his discomfort.         Interval History     4/3/23: Here today for follow up. Scrotal ultrasound with small scrotal pearls and minimal bilateral hydroceles. States his pain has improved. He underwent lab testing with his PCP and was found have low testosterone of 192. He reports weight gain and lethargy for the past several weeks. Although he and his wife have a new baby at home.      5/1/25: Virtual visit today for follow up. Patient with low testosterone. Wishes to try Clomid. Remains with lethargy, low energy, weight gain and fatigue. Trying to have more children.     8/7/25: Virtual video visit today for follow up. Prescribed Clomid 25 mg PO daily at last visit. Has noticed an improvement in his symptoms. Scheduled for labs on 8/20/25.      No history of STDS, no gross hematuria, fever, chills, nephrolithiasis,  trauma or history  malignancy.         Testosterone  248                  3/28/25  295                  5/18/23  192                  4/3/23     Urine culture  No growth        2/8/21    Past Medical History:   Diagnosis Date    Hypertension        History reviewed. No pertinent surgical history.    Review of patient's allergies indicates:  No Known  Allergies    Medications Reviewed: see MAR    FOCUSED PHYSICAL EXAM:    There were no vitals filed for this visit.  There is no height or weight on file to calculate BMI.           General: Alert, cooperative, no distress, appears stated age    LABS:    No results found for this or any previous visit (from the past 2 weeks).        Assessment/Diagnosis:    1. Low testosterone        2. Erectile dysfunction, unspecified erectile dysfunction type            Plans:    - Visit today included increased complexity associated with the care of the episodic problem addressed and managing the longitudinal care of the patient due to the serious and/or complex managed problem(s) low testosterone. Discussed the risks and benefits of testosterone replacement today.  We went over different testosterone treatments. This included possible cardiac risks.    - Continue Clomid 25 mg PO daily.   - CBC, testosterone and estradiol  on 8/20/25.   - RTC in 1 year.       Face to Face time with patient: 20 minutes  30 minutes of total time spent on the encounter, which includes face to face time and non-face to face time preparing to see the patient (eg, review of tests), Obtaining and/or reviewing separately obtained history, Documenting clinical information in the electronic or other health record, Independently interpreting results (not separately reported) and communicating results to the patient/family/caregiver, or Care coordination (not separately reported).         Each patient to whom he or she provides medical services by telemedicine is:  (1) informed of the relationship between the physician and patient and the respective role of any other health care provider with respect to management of the patient; and (2) notified that he or she may decline to receive medical services by telemedicine and may withdraw from such care at any time.    Notes:

## 2025-08-17 ENCOUNTER — LAB VISIT (OUTPATIENT)
Dept: LAB | Facility: HOSPITAL | Age: 30
End: 2025-08-17
Attending: UROLOGY
Payer: COMMERCIAL

## 2025-08-17 DIAGNOSIS — N52.9 ERECTILE DYSFUNCTION, UNSPECIFIED ERECTILE DYSFUNCTION TYPE: ICD-10-CM

## 2025-08-17 LAB
ABSOLUTE EOSINOPHIL (SMH): 0.1 K/UL
ABSOLUTE MONOCYTE (SMH): 0.57 K/UL (ref 0.3–1)
ABSOLUTE NEUTROPHIL COUNT (SMH): 3.2 K/UL (ref 1.8–7.7)
BASOPHILS # BLD AUTO: 0.06 K/UL
BASOPHILS NFR BLD AUTO: 0.8 %
ERYTHROCYTE [DISTWIDTH] IN BLOOD BY AUTOMATED COUNT: 11.8 % (ref 11.5–14.5)
ESTRADIOL SERPL HS-MCNC: 83 PG/ML (ref 11–44)
HCT VFR BLD AUTO: 47.9 % (ref 40–54)
HGB BLD-MCNC: 16.1 GM/DL (ref 14–18)
IMM GRANULOCYTES # BLD AUTO: 0.02 K/UL (ref 0–0.04)
IMM GRANULOCYTES NFR BLD AUTO: 0.3 % (ref 0–0.5)
LYMPHOCYTES # BLD AUTO: 3.52 K/UL (ref 1–4.8)
MCH RBC QN AUTO: 29 PG (ref 27–31)
MCHC RBC AUTO-ENTMCNC: 33.6 G/DL (ref 32–36)
MCV RBC AUTO: 86 FL (ref 82–98)
NUCLEATED RBC (/100WBC) (SMH): 0 /100 WBC
PLATELET # BLD AUTO: 216 K/UL (ref 150–450)
PMV BLD AUTO: 9.5 FL (ref 9.2–12.9)
RBC # BLD AUTO: 5.56 M/UL (ref 4.6–6.2)
RELATIVE EOSINOPHIL (SMH): 1.3 % (ref 0–8)
RELATIVE LYMPHOCYTE (SMH): 47.3 % (ref 18–48)
RELATIVE MONOCYTE (SMH): 7.7 % (ref 4–15)
RELATIVE NEUTROPHIL (SMH): 42.6 % (ref 38–73)
WBC # BLD AUTO: 7.44 K/UL (ref 3.9–12.7)

## 2025-08-17 PROCEDURE — 82670 ASSAY OF TOTAL ESTRADIOL: CPT

## 2025-08-17 PROCEDURE — 84403 ASSAY OF TOTAL TESTOSTERONE: CPT

## 2025-08-17 PROCEDURE — 36415 COLL VENOUS BLD VENIPUNCTURE: CPT

## 2025-08-17 PROCEDURE — 85025 COMPLETE CBC W/AUTO DIFF WBC: CPT

## 2025-08-20 LAB
W ALBUMIN: 4.4 G/DL
W SEX HORMONE BINDING GLOBULIN: 16 NMOL/L
W TESTOSTERONE, BIOAVAIL: 459.5 NG/DL
W TESTOSTERONE, FREE: 229.2 PG/ML
W TESTOSTERONE, TOTAL, MS: 868 NG/DL

## 2025-08-30 ENCOUNTER — PATIENT MESSAGE (OUTPATIENT)
Dept: UROLOGY | Facility: CLINIC | Age: 30
End: 2025-08-30
Payer: COMMERCIAL

## 2025-09-05 RX ORDER — ANASTROZOLE 1 MG/1
1 TABLET ORAL WEEKLY
Qty: 12 TABLET | Refills: 3 | Status: SHIPPED | OUTPATIENT
Start: 2025-09-05 | End: 2026-09-05